# Patient Record
Sex: MALE | Race: BLACK OR AFRICAN AMERICAN | Employment: FULL TIME | URBAN - METROPOLITAN AREA
[De-identification: names, ages, dates, MRNs, and addresses within clinical notes are randomized per-mention and may not be internally consistent; named-entity substitution may affect disease eponyms.]

---

## 2023-01-02 ENCOUNTER — APPOINTMENT (OUTPATIENT)
Dept: GENERAL RADIOLOGY | Age: 53
DRG: 312 | End: 2023-01-02
Payer: COMMERCIAL

## 2023-01-02 ENCOUNTER — APPOINTMENT (OUTPATIENT)
Dept: CT IMAGING | Age: 53
DRG: 312 | End: 2023-01-02
Payer: COMMERCIAL

## 2023-01-02 ENCOUNTER — HOSPITAL ENCOUNTER (INPATIENT)
Age: 53
LOS: 2 days | Discharge: HOME | DRG: 312 | End: 2023-01-04
Attending: EMERGENCY MEDICINE | Admitting: INTERNAL MEDICINE
Payer: COMMERCIAL

## 2023-01-02 DIAGNOSIS — R07.9 CHEST PAIN, UNSPECIFIED TYPE: ICD-10-CM

## 2023-01-02 DIAGNOSIS — K13.0 SORE OF LOWER LIP: ICD-10-CM

## 2023-01-02 DIAGNOSIS — R55 SYNCOPE AND COLLAPSE: Primary | ICD-10-CM

## 2023-01-02 DIAGNOSIS — D61.818 PANCYTOPENIA (HCC): ICD-10-CM

## 2023-01-02 LAB
ANION GAP SERPL CALCULATED.3IONS-SCNC: 12 MMOL/L (ref 3–16)
ANISOCYTOSIS: ABNORMAL
BANDED NEUTROPHILS RELATIVE PERCENT: 6 % (ref 0–7)
BASOPHILS ABSOLUTE: 0 K/UL (ref 0–0.2)
BASOPHILS RELATIVE PERCENT: 1 %
BUN BLDV-MCNC: 17 MG/DL (ref 7–20)
CALCIUM SERPL-MCNC: 9.7 MG/DL (ref 8.3–10.6)
CHLORIDE BLD-SCNC: 101 MMOL/L (ref 99–110)
CHOLESTEROL, TOTAL: 150 MG/DL (ref 0–199)
CO2: 27 MMOL/L (ref 21–32)
CREAT SERPL-MCNC: 1.1 MG/DL (ref 0.9–1.3)
EOSINOPHILS ABSOLUTE: 0 K/UL (ref 0–0.6)
EOSINOPHILS RELATIVE PERCENT: 0 %
GFR SERPL CREATININE-BSD FRML MDRD: >60 ML/MIN/{1.73_M2}
GLUCOSE BLD-MCNC: 104 MG/DL (ref 70–99)
HCT VFR BLD CALC: 37 % (ref 40.5–52.5)
HDLC SERPL-MCNC: 38 MG/DL (ref 40–60)
HEMATOLOGY PATH CONSULT: YES
HEMOGLOBIN: 11.7 G/DL (ref 13.5–17.5)
LDL CHOLESTEROL CALCULATED: 87 MG/DL
LYMPHOCYTES ABSOLUTE: 0.6 K/UL (ref 1–5.1)
LYMPHOCYTES RELATIVE PERCENT: 24 %
MCH RBC QN AUTO: 26.1 PG (ref 26–34)
MCHC RBC AUTO-ENTMCNC: 31.7 G/DL (ref 31–36)
MCV RBC AUTO: 82.3 FL (ref 80–100)
MICROCYTES: ABNORMAL
MONOCYTES ABSOLUTE: 1.2 K/UL (ref 0–1.3)
MONOCYTES RELATIVE PERCENT: 45 %
NEUTROPHILS ABSOLUTE: 0.8 K/UL (ref 1.7–7.7)
NEUTROPHILS RELATIVE PERCENT: 24 %
OVALOCYTES: ABNORMAL
PDW BLD-RTO: 16 % (ref 12.4–15.4)
PLATELET # BLD: 68 K/UL (ref 135–450)
PLATELET SLIDE REVIEW: ABNORMAL
PMV BLD AUTO: 10.2 FL (ref 5–10.5)
POIKILOCYTES: ABNORMAL
POTASSIUM REFLEX MAGNESIUM: 4.2 MMOL/L (ref 3.5–5.1)
RAPID INFLUENZA  B AGN: NEGATIVE
RAPID INFLUENZA A AGN: NEGATIVE
RBC # BLD: 4.5 M/UL (ref 4.2–5.9)
SARS-COV-2, NAAT: NOT DETECTED
SLIDE REVIEW: ABNORMAL
SODIUM BLD-SCNC: 140 MMOL/L (ref 136–145)
TEAR DROP CELLS: ABNORMAL
TRIGL SERPL-MCNC: 124 MG/DL (ref 0–150)
TROPONIN: <0.01 NG/ML
VLDLC SERPL CALC-MCNC: 25 MG/DL
WBC # BLD: 2.7 K/UL (ref 4–11)

## 2023-01-02 PROCEDURE — 80048 BASIC METABOLIC PNL TOTAL CA: CPT

## 2023-01-02 PROCEDURE — 93005 ELECTROCARDIOGRAM TRACING: CPT | Performed by: EMERGENCY MEDICINE

## 2023-01-02 PROCEDURE — 71046 X-RAY EXAM CHEST 2 VIEWS: CPT

## 2023-01-02 PROCEDURE — 84484 ASSAY OF TROPONIN QUANT: CPT

## 2023-01-02 PROCEDURE — 87804 INFLUENZA ASSAY W/OPTIC: CPT

## 2023-01-02 PROCEDURE — 87635 SARS-COV-2 COVID-19 AMP PRB: CPT

## 2023-01-02 PROCEDURE — 80061 LIPID PANEL: CPT

## 2023-01-02 PROCEDURE — 85025 COMPLETE CBC W/AUTO DIFF WBC: CPT

## 2023-01-02 PROCEDURE — 6370000000 HC RX 637 (ALT 250 FOR IP): Performed by: INTERNAL MEDICINE

## 2023-01-02 PROCEDURE — 72125 CT NECK SPINE W/O DYE: CPT

## 2023-01-02 PROCEDURE — 1200000000 HC SEMI PRIVATE

## 2023-01-02 PROCEDURE — 70450 CT HEAD/BRAIN W/O DYE: CPT

## 2023-01-02 PROCEDURE — 2580000003 HC RX 258: Performed by: INTERNAL MEDICINE

## 2023-01-02 PROCEDURE — 99285 EMERGENCY DEPT VISIT HI MDM: CPT

## 2023-01-02 PROCEDURE — 36415 COLL VENOUS BLD VENIPUNCTURE: CPT

## 2023-01-02 RX ORDER — PROMETHAZINE HYDROCHLORIDE 25 MG/1
12.5 TABLET ORAL EVERY 6 HOURS PRN
Status: DISCONTINUED | OUTPATIENT
Start: 2023-01-02 | End: 2023-01-04 | Stop reason: HOSPADM

## 2023-01-02 RX ORDER — LISINOPRIL 20 MG/1
20 TABLET ORAL DAILY
Status: DISCONTINUED | OUTPATIENT
Start: 2023-01-03 | End: 2023-01-04 | Stop reason: HOSPADM

## 2023-01-02 RX ORDER — FINASTERIDE 1 MG/1
1 TABLET, FILM COATED ORAL DAILY
Status: DISCONTINUED | OUTPATIENT
Start: 2023-01-02 | End: 2023-01-02 | Stop reason: RX

## 2023-01-02 RX ORDER — M-VIT,TX,IRON,MINS/CALC/FOLIC 27MG-0.4MG
1 TABLET ORAL DAILY
COMMUNITY

## 2023-01-02 RX ORDER — SODIUM CHLORIDE 9 MG/ML
INJECTION, SOLUTION INTRAVENOUS CONTINUOUS
Status: DISCONTINUED | OUTPATIENT
Start: 2023-01-02 | End: 2023-01-04

## 2023-01-02 RX ORDER — ACETAMINOPHEN 650 MG/1
650 SUPPOSITORY RECTAL EVERY 6 HOURS PRN
Status: DISCONTINUED | OUTPATIENT
Start: 2023-01-02 | End: 2023-01-04 | Stop reason: HOSPADM

## 2023-01-02 RX ORDER — ONDANSETRON 2 MG/ML
4 INJECTION INTRAMUSCULAR; INTRAVENOUS EVERY 6 HOURS PRN
Status: DISCONTINUED | OUTPATIENT
Start: 2023-01-02 | End: 2023-01-04 | Stop reason: HOSPADM

## 2023-01-02 RX ORDER — POTASSIUM CHLORIDE 7.45 MG/ML
10 INJECTION INTRAVENOUS PRN
Status: DISCONTINUED | OUTPATIENT
Start: 2023-01-02 | End: 2023-01-04 | Stop reason: HOSPADM

## 2023-01-02 RX ORDER — POTASSIUM CHLORIDE 20 MEQ/1
40 TABLET, EXTENDED RELEASE ORAL PRN
Status: DISCONTINUED | OUTPATIENT
Start: 2023-01-02 | End: 2023-01-04 | Stop reason: HOSPADM

## 2023-01-02 RX ORDER — SODIUM CHLORIDE 0.9 % (FLUSH) 0.9 %
10 SYRINGE (ML) INJECTION PRN
Status: DISCONTINUED | OUTPATIENT
Start: 2023-01-02 | End: 2023-01-04 | Stop reason: HOSPADM

## 2023-01-02 RX ORDER — SODIUM CHLORIDE 9 MG/ML
INJECTION, SOLUTION INTRAVENOUS PRN
Status: DISCONTINUED | OUTPATIENT
Start: 2023-01-02 | End: 2023-01-04 | Stop reason: HOSPADM

## 2023-01-02 RX ORDER — ACETAMINOPHEN 325 MG/1
650 TABLET ORAL EVERY 6 HOURS PRN
Status: DISCONTINUED | OUTPATIENT
Start: 2023-01-02 | End: 2023-01-04 | Stop reason: HOSPADM

## 2023-01-02 RX ORDER — HYDROCHLOROTHIAZIDE 25 MG/1
25 TABLET ORAL DAILY
Status: DISCONTINUED | OUTPATIENT
Start: 2023-01-03 | End: 2023-01-04 | Stop reason: HOSPADM

## 2023-01-02 RX ORDER — HYDROCHLOROTHIAZIDE 25 MG/1
25 TABLET ORAL DAILY
COMMUNITY

## 2023-01-02 RX ORDER — SODIUM CHLORIDE 0.9 % (FLUSH) 0.9 %
10 SYRINGE (ML) INJECTION EVERY 12 HOURS SCHEDULED
Status: DISCONTINUED | OUTPATIENT
Start: 2023-01-02 | End: 2023-01-04 | Stop reason: HOSPADM

## 2023-01-02 RX ORDER — MAGNESIUM SULFATE IN WATER 40 MG/ML
2000 INJECTION, SOLUTION INTRAVENOUS PRN
Status: DISCONTINUED | OUTPATIENT
Start: 2023-01-02 | End: 2023-01-04 | Stop reason: HOSPADM

## 2023-01-02 RX ORDER — IBUPROFEN 200 MG
400 TABLET ORAL 2 TIMES DAILY PRN
COMMUNITY

## 2023-01-02 RX ORDER — SILDENAFIL 50 MG/1
50 TABLET, FILM COATED ORAL PRN
COMMUNITY

## 2023-01-02 RX ORDER — ENOXAPARIN SODIUM 100 MG/ML
40 INJECTION SUBCUTANEOUS DAILY
Status: DISCONTINUED | OUTPATIENT
Start: 2023-01-03 | End: 2023-01-02

## 2023-01-02 RX ORDER — FINASTERIDE 1 MG/1
1 TABLET, FILM COATED ORAL DAILY
COMMUNITY

## 2023-01-02 RX ORDER — LISINOPRIL 20 MG/1
20 TABLET ORAL DAILY
COMMUNITY

## 2023-01-02 RX ADMIN — SODIUM CHLORIDE: 9 INJECTION, SOLUTION INTRAVENOUS at 19:37

## 2023-01-02 RX ADMIN — ACETAMINOPHEN 325MG 650 MG: 325 TABLET ORAL at 19:30

## 2023-01-02 ASSESSMENT — PAIN SCALES - GENERAL
PAINLEVEL_OUTOF10: 3
PAINLEVEL_OUTOF10: 0

## 2023-01-02 ASSESSMENT — ENCOUNTER SYMPTOMS
COUGH: 0
CHEST TIGHTNESS: 1
VOMITING: 0
COLOR CHANGE: 0
ABDOMINAL PAIN: 0
SHORTNESS OF BREATH: 0
NAUSEA: 0

## 2023-01-02 ASSESSMENT — PAIN - FUNCTIONAL ASSESSMENT: PAIN_FUNCTIONAL_ASSESSMENT: 0-10

## 2023-01-02 ASSESSMENT — PAIN DESCRIPTION - LOCATION
LOCATION: BUTTOCKS
LOCATION: BUTTOCKS;ELBOW

## 2023-01-02 NOTE — ED NOTES
Pt arrived to dept via ems. Pt c/o syncopal episode while at store this morning. Pt felt warm fuzzy feeling come over him and store staff said pt fell to ground. Pt states he did hit head but is having no pain. Denies chest pain and dizziness at this time. Denies taking blood thinners. Pt awake, alert and oriented x 3. Skin warm and dry/normal color for ethnicity. Resp easy and unlabored. Pt placed in gown and on cardiac monitor. Call light in reach. Will continue to monitor.       Martie Kawasaki, RN  01/02/23 3333

## 2023-01-02 NOTE — ED NOTES
12-lead EKG completed. Pt tolerated well. No other requests at this time.       Maggi Trejo  01/02/23 1144

## 2023-01-02 NOTE — ED PROVIDER NOTES
[facility]        Pt Name: Clare Brown  MRN: [id number]  [place] 1970  Date of evaluation: 1/2/2023  Provider: ESTELA Merida  PCP: JALEEL Piña CNP  Note Started: 3:31 PM EST 1/2/23       I have seen and evaluated this patient with my supervising physician Raquel Mohamud, [address]       Chief Complaint   Patient presents with    Loss of Consciousness     Syncopal episode at store. Onset of warm feeling, LOC, Hit head. Denies chest pain, denies dizzy, denies blood thinners. HISTORY OF PRESENT ILLNESS: 1 or more Elements     History from : Patient    Limitations to history : None    Clare Brown is a 45 y.o. male who presents to the emergency department today with complaints of a syncopal episode. Patient reports that for the last several days he has been experiencing intermittent left-sided chest pain, palpitations. Today while he was standing in line at office supply store he suddenly became hot, sweaty, and passed out. He states this is never happened before. He has been undergoing a lot of medical testing recently due to pancytopenia and consistently reactive syphilis testing despite treatment. At the moment he has no chest pain. He has no further complaints. Nursing Notes were all reviewed and agreed with or any disagreements were addressed in the HPI. REVIEW OF SYSTEMS :      Review of Systems   Constitutional:  Negative for chills and fever. HENT:  Positive for mouth sores (lower right lip). Negative for congestion. Respiratory:  Positive for chest tightness. Negative for cough and shortness of breath. Cardiovascular:  Positive for chest pain and palpitations. Negative for leg swelling. Gastrointestinal:  Negative for abdominal pain, nausea and vomiting. Genitourinary:  Negative for dysuria and urgency. Skin:  Negative for color change and rash.    Neurological: Positive for syncope. Negative for weakness and numbness. Psychiatric/Behavioral:  Negative for agitation and confusion. Positives and Pertinent negatives as per HPI. SURGICAL HISTORY   No past surgical history on file. CURRENTMEDICATIONS       Previous Medications    FINASTERIDE (PROPECIA) 1 MG TABLET    Take 1 mg by mouth daily    HYDROCHLOROTHIAZIDE (HYDRODIURIL) 25 MG TABLET    Take 25 mg by mouth daily    IBUPROFEN (ADVIL;MOTRIN) 200 MG TABLET    Take 400 mg by mouth 2 times daily as needed for Pain    LISINOPRIL (PRINIVIL;ZESTRIL) 20 MG TABLET    Take 20 mg by mouth daily    MULTIPLE VITAMINS-MINERALS (THERAPEUTIC MULTIVITAMIN-MINERALS) TABLET    Take 1 tablet by mouth daily    SILDENAFIL (VIAGRA) 50 MG TABLET    Take 50 mg by mouth as needed for Erectile Dysfunction       ALLERGIES     Bactrim [sulfamethoxazole-trimethoprim]    FAMILYHISTORY     No family history on file. SOCIAL HISTORY          SCREENINGS        Townsend Coma Scale  Eye Opening: Spontaneous  Best Verbal Response: Oriented  Best Motor Response: Obeys commands  Townsend Coma Scale Score: 15                CIWA Assessment  BP: 124/78  Heart Rate: 91           PHYSICAL EXAM  1 or more Elements     ED Triage Vitals   BP Temp Temp Source Heart Rate Resp SpO2 Height Weight   01/02/23 1135 01/02/23 1136 01/02/23 1136 01/02/23 1135 01/02/23 1135 01/02/23 1135 01/02/23 1135 01/02/23 1135   134/76 98.9 °F (37.2 °C) Oral 89 17 100 % 5' 9\" (1.753 m) 151 lb 0.2 oz (68.5 kg)       Physical Exam  Vitals and nursing note reviewed. Constitutional:       General: He is not in acute distress. Appearance: Normal appearance. He is well-developed. He is not ill-appearing, toxic-appearing or diaphoretic. HENT:      Head: Normocephalic and atraumatic. No raccoon eyes, Camacho's sign, contusion or laceration. Right Ear: Tympanic membrane normal. No hemotympanum. Left Ear: Tympanic membrane normal. No hemotympanum.       Mouth/Throat:      Mouth: Mucous membranes are moist.      Pharynx: Oropharynx is clear.   Eyes:      Conjunctiva/sclera: Conjunctivae normal.      Pupils: Pupils are equal, round, and reactive to light.   Cardiovascular:      Rate and Rhythm: Normal rate and regular rhythm.      Pulses: Normal pulses.   Pulmonary:      Effort: Pulmonary effort is normal. No respiratory distress.      Breath sounds: Normal breath sounds.   Chest:      Chest wall: No tenderness.   Abdominal:      General: Bowel sounds are normal. There is no distension.      Palpations: Abdomen is soft.      Tenderness: There is no abdominal tenderness.   Musculoskeletal:      Right elbow: No swelling or deformity. Normal range of motion. Tenderness present.      Left elbow: No swelling or deformity. Normal range of motion. Tenderness present.      Right hand: Normal.      Left hand: Normal.      Cervical back: Normal, normal range of motion and neck supple.      Thoracic back: Normal.      Lumbar back: Normal.      Right lower leg: No edema.      Left lower leg: No edema.   Skin:     General: Skin is warm and dry.   Neurological:      General: No focal deficit present.      Mental Status: He is alert and oriented to person, place, and time.      Cranial Nerves: No cranial nerve deficit.      Sensory: No sensory deficit.      Motor: No weakness.      Coordination: Coordination normal.      Gait: Gait normal.      Deep Tendon Reflexes: Reflexes normal.   Psychiatric:         Mood and Affect: Mood normal.         Behavior: Behavior normal. Behavior is cooperative.       DIAGNOSTIC RESULTS   LABS:    Labs Reviewed   CBC WITH AUTO DIFFERENTIAL - Abnormal; Notable for the following components:       Result Value    WBC 2.7 (*)     Hemoglobin 11.7 (*)     Hematocrit 37.0 (*)     RDW 16.0 (*)     Platelets 68 (*)     Neutrophils Absolute 0.8 (*)     Lymphocytes Absolute 0.6 (*)     Anisocytosis 2+ (*)     Microcytes 2+ (*)     Poikilocytes 2+ (*)     Ovalocytes 2+ (*)   Tear Drop Cells Occasional (*)     All other components within normal limits    Narrative:     Savanah Camargo tel. [id number],  Hematology results called to and read back by [facility]., 01/02/2023  14:11, by [facility]   BASIC METABOLIC PANEL W/ REFLEX TO MG FOR LOW K - Abnormal; Notable for the following components:    Glucose 104 (*)     All other components within normal limits   COVID-19, RAPID   RAPID INFLUENZA A/B ANTIGENS   TROPONIN   LIPID PANEL   BASIC METABOLIC PANEL W/ REFLEX TO MG FOR LOW K   CBC WITH AUTO DIFFERENTIAL   TROPONIN   TROPONIN       When ordered only abnormal lab results are displayed. All other labs were within normal range or not returned as of this dictation. EKG: When ordered, EKG's are interpreted by the Emergency Department Physician in the absence of a cardiologist.  Please see their note for interpretation of EKG. RADIOLOGY:   Non-plain film images such as CT, Ultrasound and MRI are read by the radiologist. Plain radiographic images are visualized and preliminarily interpreted by the ED Provider with the below findings:    Interpretation per the Radiologist below, if available at the time of this note:    XR CHEST (2 VW)   Final Result   No radiographic evidence of an acute cardiopulmonary process. CT HEAD WO CONTRAST   Final Result   No acute intracranial abnormality. CT CERVICAL SPINE WO CONTRAST   Final Result   No acute abnormality of the cervical spine. XR CHEST (2 VW)    Result Date: 1/2/2023  EXAMINATION: TWO XRAY VIEWS OF THE CHEST 1/2/2023 11:15 am COMPARISON: None. HISTORY: ORDERING SYSTEM PROVIDED HISTORY: Syncope TECHNOLOGIST PROVIDED HISTORY: Reason for exam:->Syncope Reason for Exam: Syncope FINDINGS: The lungs and costophrenic angles are clear. The cardiomediastinal silhouette and pulmonary vessels appear normal.     No radiographic evidence of an acute cardiopulmonary process.      CT HEAD WO CONTRAST    Result Date: 1/2/2023  EXAMINATION: CT OF THE HEAD WITHOUT CONTRAST  1/2/2023 1:55 pm TECHNIQUE: CT of the head was performed without the administration of intravenous contrast. Automated exposure control, iterative reconstruction, and/or weight based adjustment of the mA/kV was utilized to reduce the radiation dose to as low as reasonably achievable. COMPARISON: None. HISTORY: ORDERING SYSTEM PROVIDED HISTORY: syncope TECHNOLOGIST PROVIDED HISTORY: If patient is on cardiac monitor and/or pulse ox, they may be taken off cardiac monitor and pulse ox, left on O2 if currently on. All monitors reattached when patient returns to room. Has a \"code stroke\" or \"stroke alert\" been called? ->No Reason for exam:->syncope Decision Support Exception - unselect if not a suspected or confirmed emergency medical condition->Emergency Medical Condition (MA) Reason for Exam: syncope FINDINGS: BRAIN/VENTRICLES: There is no acute intracranial hemorrhage, mass effect or midline shift. No abnormal extra-axial fluid collection. The gray-white differentiation is maintained without evidence of an acute infarct. There is no evidence of hydrocephalus. ORBITS: The visualized portion of the orbits demonstrate no acute abnormality. SINUSES: The visualized paranasal sinuses and mastoid air cells demonstrate no acute abnormality. SOFT TISSUES/SKULL:  No acute abnormality of the visualized skull or soft tissues. No acute intracranial abnormality. CT CERVICAL SPINE WO CONTRAST    Result Date: 1/2/2023  EXAMINATION: CT OF THE CERVICAL SPINE WITHOUT CONTRAST 1/2/2023 1:55 pm TECHNIQUE: CT of the cervical spine was performed without the administration of intravenous contrast. Multiplanar reformatted images are provided for review. Automated exposure control, iterative reconstruction, and/or weight based adjustment of the mA/kV was utilized to reduce the radiation dose to as low as reasonably achievable. COMPARISON: None.  HISTORY: ORDERING SYSTEM PROVIDED HISTORY: syncope TECHNOLOGIST PROVIDED HISTORY: Reason for exam:->syncope Decision Support Exception - unselect if not a suspected or confirmed emergency medical condition->Emergency Medical Condition (MA) Reason for Exam: syncope FINDINGS: BONES/ALIGNMENT: Incomplete fusion posterior arch of C1 as a normal variant. No fracture or subluxation throughout the cervical spine. DEGENERATIVE CHANGES: Mild pattern of degenerative spondylosis at C3-4 through C5-6. No significant spinal canal stenosis. SOFT TISSUES: Paraspinal soft tissues are normal.  The visualized lungs are clear. No acute abnormality of the cervical spine. No results found. PROCEDURES   Unless otherwise noted below, none     Procedures    CRITICAL CARE TIME (.cctime)   The total critical care time I independently spent while evaluating and treating this patient was 17 minutes. This excludes time spent doing separately billable procedures. This includes time at the bedside, data interpretation, medication management, obtaining critical history from collateral sources if the patient is unable to provide it directly, and physician consultation. Specifics of interventions taken and potentially life-threatening diagnostic considerations are listed above in the medical decision making. If this was a shared visit with an physician, the time in this attestation is non-concurrent critical care time out of the total shared critical care time provided by the physician and myself. PAST MEDICAL HISTORY      has no past medical history on file.        EMERGENCY DEPARTMENT COURSE and DIFFERENTIAL DIAGNOSIS/MDM:   Vitals:    Vitals:    01/02/23 1215 01/02/23 1230 01/02/23 1342 01/02/23 1730   BP: (!) 125/98 (!) 126/103 121/88 124/78   Pulse: 94 92  91   Resp: 13 21  18   Temp:       TempSrc:       SpO2:    100%   Weight:       Height:           Patient was given the following medications:  Medications   sodium chloride flush 0.9 % injection 10 mL (has no administration in time range)   sodium chloride flush 0.9 % injection 10 mL (has no administration in time range)   0.9 % sodium chloride infusion (has no administration in time range)   potassium chloride (KLOR-CON M) extended release tablet 40 mEq (has no administration in time range)     Or   potassium bicarb-citric acid (EFFER-K) effervescent tablet 40 mEq (has no administration in time range)     Or   potassium chloride 10 mEq/100 mL IVPB (Peripheral Line) (has no administration in time range)   potassium chloride 10 mEq/100 mL IVPB (Peripheral Line) (has no administration in time range)   magnesium sulfate 2000 mg in 50 mL IVPB premix (has no administration in time range)   promethazine (PHENERGAN) tablet 12.5 mg (has no administration in time range)     Or   ondansetron (ZOFRAN) injection 4 mg (has no administration in time range)   magnesium hydroxide (MILK OF MAGNESIA) 400 MG/5ML suspension 30 mL (has no administration in time range)   acetaminophen (TYLENOL) tablet 650 mg (650 mg Oral Given 1/2/23 1930)     Or   acetaminophen (TYLENOL) suppository 650 mg ( Rectal See Alternative 1/2/23 1930)   finasteride (PROPECIA) tablet 1 mg (has no administration in time range)   hydroCHLOROthiazide (HYDRODIURIL) tablet 25 mg (has no administration in time range)   lisinopril (PRINIVIL;ZESTRIL) tablet 20 mg (has no administration in time range)   0.9 % sodium chloride infusion ( IntraVENous New Bag 1/2/23 1937)   perflutren lipid microspheres (DEFINITY) injection 1.5 mL (has no administration in time range)             Is this patient to be included in the SEP-1 Core Measure due to severe sepsis or septic shock? No   Exclusion criteria - the patient is NOT to be included for SEP-1 Core Measure due to:   Infection is not suspected    CONSULTS: (Who and What was discussed)  IP CONSULT TO CARDIOLOGY  IP CONSULT TO ONCOLOGY  Discussion with Other Profesionals : Admitting Team Hospitalist consulted for admission orders. Social Determinants : None    Records Reviewed : Outpatient Labs & Studies In 2011, pt had stress test done at [facility]. EMR reviewed as well. CC/HPI Summary, DDx, ED Course, and Reassessment: This patient is a 57-year-old male who presents to the emergency department today with a several day history of left-sided chest pain, palpitations, which culminated in a syncopal event that occurred shortly prior to arrival.  Patient at the time of valuation has no chest pain. Differential diagnoses includes ACS, arrhythmia, electrolyte disturbance, intracranial hemorrhage, fracture, contusion, other. Labs and imaging ordered. Labs with white blood cell count of 2.7, hemoglobin 11.7, hematocrit 37, platelet count 68. This is consistent with his history. Metabolic panel with no concerning abnormalities. Troponin is less than 0.01. COVID swab is negative, flu swab is negative. Chest x-ray with no acute cardiopulmonary process. CT scan of head with no acute intracranial abnormality, CT of the cervical spine with no acute cervical spine abnormality. Please see attending physician's note for EKG interpretation. Results reviewed with the patient. At this time, the patient would benefit from admission for further evaluation and management. Hospitalist was consulted for admission orders. Patient verbalized understanding, agreed with the plan. FINAL IMPRESSION      1. Syncope and collapse    2. Chest pain, unspecified type    3. Sore of lower lip    4. Pancytopenia ([address]          DISPOSITION/PLAN     DISPOSITION Admitted 01/02/2023 04:15:24 PM      PATIENT REFERRED TO:  No follow-up provider specified.     DISCHARGE MEDICATIONS:  New Prescriptions    No medications on file       DISCONTINUED MEDICATIONS:  Discontinued Medications    No medications on file              (Please note that portions of this note were completed with a voice recognition program.  Efforts were made to edit the dictations but occasionally words are mis-transcribed.)    ESTELA Garza (electronically signed)           ESTELA Garza  01/02/23 1940

## 2023-01-02 NOTE — ED PROVIDER NOTES
Emergency Department Attending Provider Note  Location: [facility] EMERGENCY DEPARTMENT  1/2/2023     Patient Identification  Alan Kiser is a 45 y.o. male      Vickii Faizan was evaluated in the Emergency Department for chest pain, palpitations, and syncope. Patient states that he was within normal limits until a couple of days ago. Patient states that since that time he has noted intermittent chest pain as well as palpitations. Today patient was reportedly at the store when he had lightheadedness followed by transient syncope. No injury noted. Patient denies chest pain at this time. . Although initial history and physical exam information was obtained by DANDRE/NPP/MD/ (who also dictated a record of this visit), I personally saw the patient and performed a substantive portion of the visit including all aspects of the medical decision making. PHYSICAL EXAM:    Head: Normocephalic/atraumatic. Heart: Regular rate and rhythm. Normal S1-S2. Lungs: Clear to auscultation bilaterally. No wheezes, rales, or rhonchi. Abdomen: Soft. Nontender, nondistended. Rebound, guarding. EKG Interpretation    EKG: Normal sinus rhythm with a rate of 90. Normal axis. Normal intervals and durations. Right bundle branch block noted. No ST or T wave changes noted. No previous EKG. Patient seen and evaluated. Relevant records reviewed. Patient's labs and EKG are stable. He will be admitted for further evaluation concerning his chest pain, palpitations, and syncope. CLINICAL IMPRESSION  1. Syncope and collapse    2. Chest pain, unspecified type    3. Sore of lower lip    4. Pancytopenia ([address]          Leander THURSTON DO, am the primary clinician of record. I personally saw the patient and independently provided 0 minutes of non-concurrent critical care out of the total shared critical care time provided.     This chart was generated in part by using [employer] and may contain errors related to that system including errors in grammar, punctuation, and spelling, as well as words and phrases that may be inappropriate. If there are any questions or concerns please feel free to contact the dictating provider for clarification.      Ruma Montemayor DO   [employer]       Ruma Montemayor DO  01/02/23 1533

## 2023-01-02 NOTE — H&P
Hospital Medicine History & Physical      PCP: Griffin Leon, APRN - CNP    Date of Admission: 1/2/2023    Chief Complaint:    Chief Complaint   Patient presents with    Loss of Consciousness     Syncopal episode at store. Onset of warm feeling, LOC, Hit head. Denies chest pain, denies dizzy, denies blood thinners. History Of Present Illness:    Patient is a 49-year-old male who presents to the hospital after an episode of loss of consciousness. According to patient he was at work he felt dizzy lightheaded before the episode. He denies chest pain shortness of breath nausea vomiting. He mentioned he felt sweaty before the event. Patient denied fever chills diarrhea constipation dysuria. He also reports he has chest pain on the right side of her chest, denies active chest pain at time of my evaluation, he has not noticed any aggravating or relieving factors. He has established hematologist for his pancytopenia, he was diagnosed with syphilis in the past, he had bone marrow biopsy as outpatient. Past Medical History:      Pancytopenia  Syphilis    Past Surgical History:      No past surgical history on file. Medications Prior to Admission:      Prior to Admission medications    Medication Sig Start Date End Date Taking?  Authorizing Provider   sildenafil (VIAGRA) 50 MG tablet Take 50 mg by mouth as needed for Erectile Dysfunction   Yes Historical Provider, MD   finasteride (PROPECIA) 1 MG tablet Take 1 mg by mouth daily   Yes Historical Provider, MD   Multiple Vitamins-Minerals (THERAPEUTIC MULTIVITAMIN-MINERALS) tablet Take 1 tablet by mouth daily   Yes Historical Provider, MD   lisinopril (PRINIVIL;ZESTRIL) 20 MG tablet Take 20 mg by mouth daily   Yes Historical Provider, MD   hydroCHLOROthiazide (HYDRODIURIL) 25 MG tablet Take 25 mg by mouth daily   Yes Historical Provider, MD   ibuprofen (ADVIL;MOTRIN) 200 MG tablet Take 400 mg by mouth 2 times daily as needed for Pain   Yes Historical Provider, MD       Allergies:  Bactrim [sulfamethoxazole-trimethoprim]    Social History:      TOBACCO:   has no history on file for tobacco use. ETOH:   has no history on file for alcohol use. Family History:       Reviewed in detail and non contributory      No family history on file. REVIEW OF SYSTEMS:   Pertinent positives as noted in the HPI. All other systems reviewed and negative. PHYSICAL EXAM PERFORMED:    /78   Pulse 91   Temp 98.9 °F (37.2 °C) (Oral)   Resp 18   Ht 5' 9\" (1.753 m)   Wt 151 lb 0.2 oz (68.5 kg)   SpO2 100%   BMI 22.30 kg/m²     General appearance:  No apparent distress, cooperative. HEENT:  Normal cephalic, atraumatic without obvious deformity. Conjunctivae/corneas clear. Neck: Supple, with full range of motion. No cervical lymphadenopathy  Respiratory:  Normal respiratory effort. Clear to auscultation, bilaterally without Rales/Wheezes/Rhonchi. Cardiovascular:  Regular rate and rhythm with normal S1/S2 without murmurs, rubs or gallops. Abdomen: Soft, non-tender, non-distended, normal bowel sounds. Musculoskeletal:  No edema noted bilaterally. No tenderness on palpation   Skin: no rash visible  Neurologic:  Neurologically intact without any focal sensory/motor deficits. grossly non-focal.  Psychiatric:  Alert and oriented, normal mood  Peripheral Pulses: +2 palpable, equal bilaterally       Labs:     Recent Labs     01/02/23  1212   WBC 2.7*   HGB 11.7*   HCT 37.0*   PLT 68*     Recent Labs     01/02/23  1212      K 4.2      CO2 27   BUN 17   CREATININE 1.1   CALCIUM 9.7     No results for input(s): AST, ALT, BILIDIR, BILITOT, ALKPHOS in the last 72 hours. No results for input(s): INR in the last 72 hours.   Recent Labs     01/02/23  1212   TROPONINI <0.01       Urinalysis:    No results found for: Valentina Bernardo, BACTERIA, RBCUA, BLOODU, SPECGRAV, GLUCOSEU    Radiology:       XR CHEST (2 VW)   Final Result   No radiographic evidence of an acute cardiopulmonary process. CT HEAD WO CONTRAST   Final Result   No acute intracranial abnormality. CT CERVICAL SPINE WO CONTRAST   Final Result   No acute abnormality of the cervical spine. Active Hospital Problems    Diagnosis Date Noted    Chest pain [R07.9] 01/02/2023     Priority: Medium         Patient is a 49-year-old male who presents to the hospital after an episode of loss of consciousness. According to patient he was at work he felt dizzy lightheaded before the episode. He denies chest pain shortness of breath nausea vomiting. He mentioned he felt sweaty before the event. Patient denied fever chills diarrhea constipation dysuria. He also reports he has chest pain on the right side of her chest, denies active chest pain at time of my evaluation, he has not noticed any aggravating or relieving factors. He has established hematologist for his pancytopenia, he was diagnosed with syphilis in the past, he had bone marrow biopsy as outpatient. Assessment  Syncope and collapse suspect orthostatic hypotension  Chest pain rule out ACS  Pancytopenia    Plan  Check orthostatic vitals   monitor troponin  Monitor on cardiac telemetry  Consult cardiology  Check echocardiogram  Holding chemical DVT prophylaxis in the setting of thrombocytopenia DVT prophylaxis-SCDs for now  Resume home medications  Diet: ADULT DIET; Regular  Code Status: Full Code    PT/OT Eval Status: ordered    Dispo - pending clinical improvement       Antonella Walton MD    The note was completed using EMR and Dragon dictation system. Every effort was made to ensure accuracy; however, inadvertent computerized transcription errors may be present. Thank you JALEEL Landers CNP for the opportunity to be involved in this patient's care. If you have any questions or concerns please feel free to contact me at [phone].     Antonella Walton MD

## 2023-01-02 NOTE — LETTER
[address]  Phone: [phone]             January 4, 2023    Patient: Yohan Barboza   YOB: 1970   Date of Visit: 1/2/2023       To Whom It May Concern:    Yohan Barboza was seen and treated in our facility  beginning 1/2/2023 until 1/4/2023.        Sincerely,     Rose Mary Parkinson MD  [facility]     [phone]    Signature:__________________________________

## 2023-01-03 ENCOUNTER — APPOINTMENT (OUTPATIENT)
Dept: MRI IMAGING | Age: 53
DRG: 312 | End: 2023-01-03
Payer: COMMERCIAL

## 2023-01-03 LAB
ANION GAP SERPL CALCULATED.3IONS-SCNC: 9 MMOL/L (ref 3–16)
BASOPHILS ABSOLUTE: 0 K/UL (ref 0–0.2)
BASOPHILS RELATIVE PERCENT: 0.5 %
BUN BLDV-MCNC: 17 MG/DL (ref 7–20)
CALCIUM SERPL-MCNC: 8.3 MG/DL (ref 8.3–10.6)
CHLORIDE BLD-SCNC: 104 MMOL/L (ref 99–110)
CO2: 25 MMOL/L (ref 21–32)
CREAT SERPL-MCNC: 1 MG/DL (ref 0.9–1.3)
EKG ATRIAL RATE: 90 BPM
EKG DIAGNOSIS: NORMAL
EKG P AXIS: 76 DEGREES
EKG P-R INTERVAL: 148 MS
EKG Q-T INTERVAL: 370 MS
EKG QRS DURATION: 116 MS
EKG QTC CALCULATION (BAZETT): 452 MS
EKG R AXIS: 109 DEGREES
EKG T AXIS: 79 DEGREES
EKG VENTRICULAR RATE: 90 BPM
EOSINOPHILS ABSOLUTE: 0 K/UL (ref 0–0.6)
EOSINOPHILS RELATIVE PERCENT: 0.5 %
GFR SERPL CREATININE-BSD FRML MDRD: >60 ML/MIN/{1.73_M2}
GLUCOSE BLD-MCNC: 102 MG/DL (ref 70–99)
HCT VFR BLD CALC: 31 % (ref 40.5–52.5)
HEMATOLOGY PATH CONSULT: NO
HEMATOLOGY PATH CONSULT: NORMAL
HEMOGLOBIN: 10.1 G/DL (ref 13.5–17.5)
LYMPHOCYTES ABSOLUTE: 0.7 K/UL (ref 1–5.1)
LYMPHOCYTES RELATIVE PERCENT: 37.1 %
MCH RBC QN AUTO: 26.3 PG (ref 26–34)
MCHC RBC AUTO-ENTMCNC: 32.4 G/DL (ref 31–36)
MCV RBC AUTO: 81.1 FL (ref 80–100)
MONOCYTES ABSOLUTE: 0.7 K/UL (ref 0–1.3)
MONOCYTES RELATIVE PERCENT: 38.5 %
NEUTROPHILS ABSOLUTE: 0.5 K/UL (ref 1.7–7.7)
NEUTROPHILS RELATIVE PERCENT: 23.4 %
PDW BLD-RTO: 16.4 % (ref 12.4–15.4)
PLATELET # BLD: 50 K/UL (ref 135–450)
PMV BLD AUTO: 9.4 FL (ref 5–10.5)
POTASSIUM REFLEX MAGNESIUM: 3.8 MMOL/L (ref 3.5–5.1)
RBC # BLD: 3.83 M/UL (ref 4.2–5.9)
SODIUM BLD-SCNC: 138 MMOL/L (ref 136–145)
TROPONIN: <0.01 NG/ML
TROPONIN: <0.01 NG/ML
WBC # BLD: 1.9 K/UL (ref 4–11)

## 2023-01-03 PROCEDURE — 80048 BASIC METABOLIC PNL TOTAL CA: CPT

## 2023-01-03 PROCEDURE — 6370000000 HC RX 637 (ALT 250 FOR IP): Performed by: INTERNAL MEDICINE

## 2023-01-03 PROCEDURE — 84484 ASSAY OF TROPONIN QUANT: CPT

## 2023-01-03 PROCEDURE — 2580000003 HC RX 258: Performed by: INTERNAL MEDICINE

## 2023-01-03 PROCEDURE — 1200000000 HC SEMI PRIVATE

## 2023-01-03 PROCEDURE — 99254 IP/OBS CNSLTJ NEW/EST MOD 60: CPT | Performed by: STUDENT IN AN ORGANIZED HEALTH CARE EDUCATION/TRAINING PROGRAM

## 2023-01-03 PROCEDURE — 93010 ELECTROCARDIOGRAM REPORT: CPT | Performed by: INTERNAL MEDICINE

## 2023-01-03 PROCEDURE — 36415 COLL VENOUS BLD VENIPUNCTURE: CPT

## 2023-01-03 PROCEDURE — 85025 COMPLETE CBC W/AUTO DIFF WBC: CPT

## 2023-01-03 PROCEDURE — A9577 INJ MULTIHANCE: HCPCS | Performed by: INTERNAL MEDICINE

## 2023-01-03 PROCEDURE — 70553 MRI BRAIN STEM W/O & W/DYE: CPT

## 2023-01-03 PROCEDURE — 6360000004 HC RX CONTRAST MEDICATION: Performed by: INTERNAL MEDICINE

## 2023-01-03 RX ORDER — LIDOCAINE HYDROCHLORIDE 20 MG/ML
15 SOLUTION OROPHARYNGEAL 3 TIMES DAILY
Status: DISCONTINUED | OUTPATIENT
Start: 2023-01-03 | End: 2023-01-04 | Stop reason: HOSPADM

## 2023-01-03 RX ORDER — GUAIFENESIN/DEXTROMETHORPHAN 100-10MG/5
5 SYRUP ORAL EVERY 4 HOURS PRN
Status: DISCONTINUED | OUTPATIENT
Start: 2023-01-03 | End: 2023-01-04 | Stop reason: HOSPADM

## 2023-01-03 RX ADMIN — LIDOCAINE HYDROCHLORIDE 15 ML: 20 SOLUTION ORAL at 16:48

## 2023-01-03 RX ADMIN — GADOBENATE DIMEGLUMINE 14 ML: 529 INJECTION, SOLUTION INTRAVENOUS at 13:13

## 2023-01-03 RX ADMIN — LIDOCAINE HYDROCHLORIDE 15 ML: 20 SOLUTION ORAL at 19:49

## 2023-01-03 RX ADMIN — SODIUM CHLORIDE: 9 INJECTION, SOLUTION INTRAVENOUS at 23:45

## 2023-01-03 RX ADMIN — LIDOCAINE HYDROCHLORIDE 15 ML: 20 SOLUTION ORAL at 07:39

## 2023-01-03 RX ADMIN — HYDROCHLOROTHIAZIDE 25 MG: 25 TABLET ORAL at 08:14

## 2023-01-03 RX ADMIN — LISINOPRIL 20 MG: 20 TABLET ORAL at 08:14

## 2023-01-03 RX ADMIN — ACETAMINOPHEN 325MG 650 MG: 325 TABLET ORAL at 18:12

## 2023-01-03 RX ADMIN — Medication 10 ML: at 19:38

## 2023-01-03 ASSESSMENT — PAIN SCALES - GENERAL: PAINLEVEL_OUTOF10: 0

## 2023-01-03 NOTE — PROGRESS NOTES
Pharmacy does not stock Propecia 1 mg tablets    Patient states does not need    Pharmacy has discontinued the Propecia    If this is NOT OK., please contact the pharmacy  Thanks

## 2023-01-03 NOTE — CONSULTS
[facility].        Chief Complaint   Patient presents with    Loss of Consciousness     Syncopal episode at store. Onset of warm feeling, LOC, Hit head. Denies chest pain, denies dizzy, denies blood thinners. History of Present Illness:  Jacquie Abdalla is a 45 y.o. patient who presented to the hospital with complaints of Syncope. I have been asked to provide consultation regarding further management and testing. This is a very pleasant 23-year-old male with no documented past medical history though he does tell me that he is struggled with hypertension in the past.  He presents after a syncopal event, reports atypical chest pain that is been going on for months. 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 At that point he again felt flushed lightheaded but then this time consciousness. During this he endorsed to me he has had atypical right-sided chest pain has been on and off for several months, sounds like he thought it was more costochondral in nature and he does not particularly have an aggravating or alleviating factor. Past Medical History:   has no past medical history on file. Surgical History:   has no past surgical history on file. Social History:        Family History:  No family history of premature coronary artery disease, aortic disease, or valve disease. Home Medications:  Were reviewed and are listed in nursing record. and/or listed below  Prior to Admission medications    Medication Sig Start Date End Date Taking?  Authorizing Provider   sildenafil (VIAGRA) 50 MG tablet Take 50 mg by mouth as needed for Erectile Dysfunction   Yes Historical Provider, MD   finasteride (PROPECIA) 1 MG tablet Take 1 mg by mouth daily   Yes Historical Provider, MD   Multiple Vitamins-Minerals (THERAPEUTIC MULTIVITAMIN-MINERALS) tablet Take 1 tablet by mouth daily   Yes Historical Provider, MD   lisinopril (PRINIVIL;ZESTRIL) 20 MG tablet Take 20 mg by mouth daily   Yes Historical Provider, MD   hydroCHLOROthiazide (HYDRODIURIL) 25 MG tablet Take 25 mg by mouth daily   Yes Historical Provider, MD   ibuprofen (ADVIL;MOTRIN) 200 MG tablet Take 400 mg by mouth 2 times daily as needed for Pain   Yes Historical Provider, MD        Current Medications:  Current Facility-Administered Medications   Medication Dose Route Frequency Provider Last Rate Last Admin    lidocaine viscous hcl (XYLOCAINE) 2 % solution 15 mL  15 mL Mouth/Throat TID Milagros Sam MD   15 mL at 01/03/23 0739    guaiFENesin-dextromethorphan (ROBITUSSIN DM) 100-10 MG/5ML syrup 5 mL  5 mL Oral Q4H PRN Pepe Watt MD        sodium chloride flush 0.9 % injection 10 mL  10 mL IntraVENous 2 times per day Uli Bolden MD        sodium chloride flush 0.9 % injection 10 mL  10 mL IntraVENous PRN Uli Bolden MD        0.9 % sodium chloride infusion   IntraVENous PRN Uli Bolden MD        potassium chloride (KLOR-CON M) extended release tablet 40 mEq  40 mEq Oral PRN Uli Bolden MD        Or    potassium bicarb-citric acid (EFFER-K) effervescent tablet 40 mEq  40 mEq Oral PRN Uli Bolden MD        Or    potassium chloride 10 mEq/100 mL IVPB (Peripheral Line)  10 mEq IntraVENous PRN Uli Bolden MD        potassium chloride 10 mEq/100 mL IVPB (Peripheral Line)  10 mEq IntraVENous PRN Uli Bolden MD        magnesium sulfate 2000 mg in 50 mL IVPB premix  2,000 mg IntraVENous PRN Uli Bolden MD        promethazine (PHENERGAN) tablet 12.5 mg  12.5 mg Oral Q6H PRN Uli Bolden MD        Or    ondansetron (ZOFRAN) injection 4 mg  4 mg IntraVENous Q6H PRN Joanna Toth MD        magnesium hydroxide (MILK OF MAGNESIA) 400 MG/5ML suspension 30 mL  30 mL Oral Daily PRN Joanna Toth MD        acetaminophen (TYLENOL) tablet 650 mg  650 mg Oral Q6H PRN Joanna Toth MD   650 mg at 01/02/23 1930    Or    acetaminophen (TYLENOL) suppository 650 mg  650 mg Rectal Q6H PRN Joanna Toth MD        hydroCHLOROthiazide (HYDRODIURIL) tablet 25 mg  25 mg Oral Daily Joanna Toth MD   25 mg at 01/03/23 0814    lisinopril (PRINIVIL;ZESTRIL) tablet 20 mg  20 mg Oral Daily Joanna Toth MD   20 mg at 01/03/23 0814    0.9 % sodium chloride infusion   IntraVENous Continuous Joanna Toth MD 75 mL/hr at 01/02/23 1937 New Bag at 01/02/23 1937    perflutren lipid microspheres (DEFINITY) injection 1.5 mL  1.5 mL IntraVENous ONCE PRN Joanna Toth MD            Allergies:  Bactrim [sulfamethoxazole-trimethoprim]     Review of Systems:   Positive for lightheadedness, dizziness, loss of consciousness  All other systems reviewed and negative    Physical Examination:    Vitals:    01/03/23 1555   BP: 133/83   Pulse: 82   Resp: 18   Temp: 100.4 °F (38 °C)   SpO2: 98%      Weight: 151 lb 0.2 oz (68.5 kg)       Physical Exam  Vitals and nursing note reviewed. Constitutional:       Appearance: Normal appearance. He is not ill-appearing or diaphoretic. HENT:      Head: Normocephalic and atraumatic. Mouth/Throat:      Mouth: Mucous membranes are moist.   Eyes:      Pupils: Pupils are equal, round, and reactive to light. Cardiovascular:      Rate and Rhythm: Normal rate and regular rhythm. Heart sounds: No murmur heard. Pulmonary:      Effort: Pulmonary effort is normal. No respiratory distress. Abdominal:      General: Abdomen is flat. There is no distension. Musculoskeletal:      Right lower leg: No edema. Left lower leg: No edema. Skin:     General: Skin is warm and dry. Neurological:      General: No focal deficit present.       Mental Status: He is alert and oriented to person, place, and time. Psychiatric:         Mood and Affect: Mood normal.         Behavior: Behavior normal.        Labs  CBC:   Lab Results   Component Value Date/Time    WBC 1.9 01/03/2023 05:16 AM    RBC 3.83 01/03/2023 05:16 AM    HGB 10.1 01/03/2023 05:16 AM    HCT 31.0 01/03/2023 05:16 AM    MCV 81.1 01/03/2023 05:16 AM    RDW 16.4 01/03/2023 05:16 AM    PLT 50 01/03/2023 05:16 AM     CMP:    Lab Results   Component Value Date/Time     01/03/2023 05:16 AM    K 3.8 01/03/2023 05:16 AM     01/03/2023 05:16 AM    CO2 25 01/03/2023 05:16 AM    BUN 17 01/03/2023 05:16 AM    CREATININE 1.0 01/03/2023 05:16 AM    LABGLOM >60 01/03/2023 05:16 AM    GLUCOSE 102 01/03/2023 05:16 AM    CALCIUM 8.3 01/03/2023 05:16 AM     PT/INR:  No results found for: PTINR  Lab Results   Component Value Date    TROPONINI <0.01 01/03/2023       EKG:  I have reviewed EKG with the following interpretation:  Impression: Sinus rhythm with a right bundle branch block    Old notes reviewed  Telemetry reviewed  Ekg personally reviewed  Chest xray personally reviewed  Echo, cath, and   Medications and labs reviewed      Assessment/Plan:  Syncope: Sounds like it was vasovagal/orthostatic in nature, by description really does not sound like it was arrhythmia mediated. No significant murmur on examination  -Agree with transthoracic echocardiogram    Atypical chest pain:  Nuclear MPI in a.m.      ---    I will address the patient's cardiac risk factors and adjusted pharmacologic treatment as needed. In addition, I have reinforced the need for patient directed risk factor modification. Tobacco use was discussed with the patient and educated on the negative effects. I have asked the patient to not utilize these agents. Thank you for allowing to us to participate in the [facility] or Alen Bustamante. Further evaluation will be based upon the patient's clinical course and testing results.     All questions and concerns were addressed to the patient/family. Alternatives to my treatment were discussed. The note was completed using EMR. Every effort was made to ensure accuracy; however, inadvertent computerized transcription errors may be present. Megan Copeland MD  Interventional Cardiology  1/3/2023  4:12 PM    [facility], [address]  Ph: [phone]  Fax: [phone]    NOTE: This report was transcribed using voice recognition software. Every effort was made to ensure accuracy, however, inadvertent computerized transcription errors may be present.

## 2023-01-03 NOTE — ED NOTES
Report given to [facility]. No further questions.       Torito Thomas, RN  01/02/23 4548

## 2023-01-03 NOTE — ADT AUTH CERT
[facility] EMERGENCY DEPARTMENT  [address] [place] [address]  [phone]   [id number]    Auth number: N/A    [id number] - (Commercial)    Return Contact Information:  Randi Worley  PU:[phone]  [phone]

## 2023-01-03 NOTE — CONSULTS
[facility]    Consult Note      Requesting Physician:  renay     CHIEF COMPLAINT:  chest pain       HISTORY OF PRESENT ILLNESS:    Mr. Eb Rodarte  is a 45 y.o. male we are seeing in consultation for pancytopenia   The pt has a history of being incarcerated for 16 years   The pt has had an extensive workup at [facility] for pancytopenia-he has had a boen marrow-for an mspike-  THe marrow a few months ago did not diagnose any blood anomaly-he had some plasma cells in the marrow but not enough to cause myeloma or these counts  IT was not known why his counts were low  HE has had some rheum labs that are not normla and is set up to see a rheumatologist at [facility] and his  hematologist referred the pt to dr Maya Rowley for the m protein   Mixed in all this is the pts history of syphillus  The pt states being incarcerated for 16 years he would not have had the chance or history of this but he tells me in [address] he had a test where the health dept apparently found it to be positive but the pt was not treated  THus recently his syphilus tests have been repeated and there is some reactivity  The pt gives a hx of being tx for symphilus recently   I dont see any referrals to ID for this in outpt setting   Im not clear if the pt could have secondary ? Could this related to his issues? Pt states he has a gonzalez rash and has what he describes as mouth sores     ICD-10-CM    1. Syncope and collapse  R55       2. Chest pain, unspecified type  R07.9       3. Sore of lower lip  K13.0       4. Pancytopenia ([address]  K57.829              Past Medical History:  No past medical history on file. ? Positive syphillus     Past Surgical History:  No past surgical history on file.     Current Medications:  Current Facility-Administered Medications   Medication Dose Route Frequency Provider Last Rate Last Admin    lidocaine viscous hcl (XYLOCAINE) 2 % solution 15 mL  15 mL Mouth/Throat TID Lauren Toussaint MD   15 mL at 01/03/23 1949    guaiFENesin-dextromethorphan (ROBITUSSIN DM) 100-10 MG/5ML syrup 5 mL  5 mL Oral Q4H PRN Noe Zamarripa MD        sodium chloride flush 0.9 % injection 10 mL  10 mL IntraVENous 2 times per day Kin Meza MD   10 mL at 01/03/23 1938    sodium chloride flush 0.9 % injection 10 mL  10 mL IntraVENous PRN Kin Meza MD        0.9 % sodium chloride infusion   IntraVENous PRN Kin Meza MD        potassium chloride (KLOR-CON M) extended release tablet 40 mEq  40 mEq Oral PRN Kin Meza MD        Or    potassium bicarb-citric acid (EFFER-K) effervescent tablet 40 mEq  40 mEq Oral PRN Kin Meza MD        Or    potassium chloride 10 mEq/100 mL IVPB (Peripheral Line)  10 mEq IntraVENous PRN Kin Meza MD        potassium chloride 10 mEq/100 mL IVPB (Peripheral Line)  10 mEq IntraVENous PRN Kin Meza MD        magnesium sulfate 2000 mg in 50 mL IVPB premix  2,000 mg IntraVENous PRN Kin Meza MD        promethazine (PHENERGAN) tablet 12.5 mg  12.5 mg Oral Q6H PRN Kin Meza MD        Or    ondansetron (ZOFRAN) injection 4 mg  4 mg IntraVENous Q6H PRN Kin Meza MD        magnesium hydroxide (MILK OF MAGNESIA) 400 MG/5ML suspension 30 mL  30 mL Oral Daily PRN Kin Meza MD        acetaminophen (TYLENOL) tablet 650 mg  650 mg Oral Q6H PRN Kin Meza MD   650 mg at 01/04/23 0126    Or    acetaminophen (TYLENOL) suppository 650 mg  650 mg Rectal Q6H PRN Kin Meza MD        hydroCHLOROthiazide (HYDRODIURIL) tablet 25 mg  25 mg Oral Daily Kin Meza MD   25 mg at 01/03/23 0814    lisinopril (PRINIVIL;ZESTRIL) tablet 20 mg  20 mg Oral Daily Evan Javier MD   20 mg at 01/03/23 0814    0.9 % sodium chloride infusion   IntraVENous Continuous Kin Meza MD 75 mL/hr at 01/03/23 2345 New Bag at 01/03/23 2345    perflutren lipid microspheres (DEFINITY) injection 1.5 mL  1.5 mL IntraVENous ONCE PRN Dave Osman MD           Allergies: Allergies   Allergen Reactions    Bactrim [Sulfamethoxazole-Trimethoprim]      Lip swelling       Social History:  Social History     Socioeconomic History    Marital status: Single     Spouse name: Not on file    Number of children: Not on file    Years of education: Not on file    Highest education level: Not on file   Occupational History    Not on file   Tobacco Use    Smoking status: Not on file    Smokeless tobacco: Not on file   Substance and Sexual Activity    Alcohol use: Not on file    Drug use: Not on file    Sexual activity: Not on file   Other Topics Concern    Not on file   Social History Narrative    Not on file     Social Determinants of Health     Financial Resource Strain: Not on file   Food Insecurity: Not on file   Transportation Needs: Not on file   Physical Activity: Not on file   Stress: Not on file   Social Connections: Not on file   Intimate Partner Violence: Not on file   Housing Stability: Not on file          Family History:  No family history on file.   Review of Systems - General ROS: positive for  - fatigue, fever, and hot flashes  Psychological ROS: negative  Ophthalmic ROS: negative  ENT ROS: positive for - oral lesions  Hematological and Lymphatic ROS: negative  Respiratory ROS: positive for - pleuritic pain  Cardiovascular ROS: positive for - chest pain and loss of consciousness  Gastrointestinal ROS: no abdominal pain, change in bowel habits, or black or bloody stools  Genito-Urinary ROS: no dysuria, trouble voiding, or hematuria  Musculoskeletal ROS: negative  Neurological ROS: no TIA or stroke symptoms       PHYSICAL EXAM:      Vitals:  Patient Vitals for the past 24 hrs:   BP Temp Temp src Pulse Resp SpO2 Weight   01/04/23 0520 -- -- -- -- -- -- 150 lb (68 kg)   01/04/23 0453 (!) 158/87 98.1 °F (36.7 °C) Oral 68 16 100 % --   01/04/23 0115 -- 97.9 °F (36.6 °C) Oral -- -- -- --   01/04/23 0101 (!) 151/87 100.1 °F (37.8 °C) Oral 72 18 100 % --   01/03/23 2009 125/81 99.2 °F (37.3 °C) Oral 79 16 98 % --   01/03/23 1800 -- (!) 100.5 °F (38.1 °C) Oral -- -- -- --   01/03/23 1555 133/83 100.4 °F (38 °C) Oral 82 18 98 % --   01/03/23 1515 122/82 -- -- 76 17 100 % --   01/03/23 1500 125/80 -- -- 83 21 100 % --   01/03/23 1330 (!) 138/98 -- -- 89 19 -- --   01/03/23 0830 130/82 -- -- 81 20 100 % --   01/03/23 0815 135/87 -- -- (!) 101 12 98 % --   01/03/23 0814 135/87 -- -- -- -- -- --           Physical Exam   Cons -awake oriented x 3  HEENT perrl eomi anicteric-+apthous ulcers   NEck supple no jvd  CV RRR  Lungs -clear   Abd soft no hsm  Ext no c/c/e     DATA:    PT/INR:  No results for input(s): PROT, INR in the last 720 hours. PTT:  No results for input(s): APTT in the last 720 hours. CMP:    Recent Labs     01/04/23  0601      K 4.1      CO2 26   BUN 11     Mg:  No results for input(s): MG in the last 720 hours. Lab Results   Component Value Date    CALCIUM 8.3 01/04/2023       CBC:    Recent Labs     01/03/23  0516 01/02/23  1212   WBC 1.9* 2.7*   NEUTROABS 0.5* 0.8*   LYMPHOPCT 37.1 24.0   RBC 3.83* 4.50   HGB 10.1* 11.7*   HCT 31.0* 37.0*   MCV 81.1 82.3   MCH 26.3 26.1   MCHC 32.4 31.7   RDW 16.4* 16.0*   PLT 50* 68*        LDH:No results for input(s): LDH in the last 720 hours. Radiology Review:  MRI BRAIN W WO CONTRAST  Narrative: EXAMINATION:  MRI OF THE BRAIN WITHOUT AND WITH CONTRAST  1/3/2023 12:34 pm    TECHNIQUE:  Multiplanar multisequence MRI of the head/brain was performed without and  with the administration of intravenous contrast.    COMPARISON:  None.     HISTORY:  ORDERING SYSTEM PROVIDED HISTORY: hx syphilis, syncope, persistent  lightheadedness/dizziness, vertiginous symptoms  TECHNOLOGIST PROVIDED HISTORY:  Reason for exam:->hx syphilis, syncope, persistent lightheadedness/dizziness,  vertiginous symptoms  Reason for Exam: hx syphilis, syncope, persistent lightheadedness/dizziness,  vertiginous symptoms    Initial evaluation. FINDINGS:  INTRACRANIAL STRUCTURES/VENTRICLES:  There is no acute infarct. No mass  effect or midline shift. No evidence of an acute intracranial hemorrhage. The ventricles and sulci are normal in size and configuration. The  sellar/suprasellar regions appear unremarkable. The normal signal voids  within the major intracranial vessels appear maintained. No abnormal focus  of enhancement is seen within the brain. ORBITS: The visualized portion of the orbits demonstrate no acute abnormality. SINUSES: The visualized paranasal sinuses and mastoid air cells demonstrate  no acute abnormality. BONES/SOFT TISSUES: The bone marrow signal intensity appears normal. The soft  tissues demonstrate no acute abnormality. Impression: No acute intracranial abnormality. No acute infarct. Problem List  Patient Active Problem List   Diagnosis    Chest pain       IMPRESSION/RECOMMENDATIONS:    Pancytopenia   Pt has had an extensive workup for pancytopenia   He has an mgus-marrow a few montsh back did not have a reason for his low counts   The mgus is being followed with out major changes   He has had some autoimmune labs come back abnormal in his workup at [facility] and has been referred to rheum for this -autoimmune issues can cause low counts   There is also a history of being positive for symphillis that was never treated for years-? This history is a bit confusing but it does seem  he was positive for this -it does not seem to be false positive and apparently had in 96 but not treated   Is there a potential for a secondary/tertiary problem related to syphillus-its so rarely seen im not the expert on this -? Consider ID opinion -could it relate ? I dont think a new marrow is needed  Ulcers in mouth again ?  Autoimmune in nature   Rheumo workup really needed but cant to inpt   Not sure if id should comment on this case will discuss with hosptialist

## 2023-01-03 NOTE — PROGRESS NOTES
Hospitalist Progress Note      PCP: JALEEL Gonzalez CNP    Date of Admission: 1/2/2023    Chief Complaint: syncope and collapse    Hospital Course: Patient is a 49-year-old male who presents to the hospital after an episode of loss of consciousness. According to patient he was at work he felt dizzy lightheaded before the episode. He denies chest pain shortness of breath nausea vomiting. He mentioned he felt sweaty before the event. Patient denied fever chills diarrhea constipation dysuria. He also reports he has chest pain on the right side of her chest, denies active chest pain at time of my evaluation, he has not noticed any aggravating or relieving factors. He has established hematologist for his pancytopenia, he was diagnosed with syphilis in the past, he had bone marrow biopsy as outpatient. Subjective: Pt seen and examined. Continues to complain of lightheadedness and dizziness, especially with movement. Orthostatic vitals negative. Medications:  Reviewed    Infusion Medications    sodium chloride      sodium chloride 75 mL/hr at 01/02/23 1937     Scheduled Medications    lidocaine viscous hcl  15 mL Mouth/Throat TID    sodium chloride flush  10 mL IntraVENous 2 times per day    hydroCHLOROthiazide  25 mg Oral Daily    lisinopril  20 mg Oral Daily     PRN Meds: sodium chloride flush, sodium chloride, potassium chloride **OR** potassium alternative oral replacement **OR** potassium chloride, potassium chloride, magnesium sulfate, promethazine **OR** ondansetron, magnesium hydroxide, acetaminophen **OR** acetaminophen, perflutren lipid microspheres    No intake or output data in the 24 hours ending 01/03/23 1046    Exam:    /82   Pulse 81   Temp 98.9 °F (37.2 °C) (Oral)   Resp 20   Ht 5' 9\" (1.753 m)   Wt 151 lb 0.2 oz (68.5 kg)   SpO2 100%   BMI 22.30 kg/m²     General appearance: No apparent distress, appears stated age and cooperative.   HEENT: Pupils equal, round, and reactive to light. Conjunctivae/corneas clear. Neck: Supple, with full range of motion. No jugular venous distention. Trachea midline. Respiratory:  Normal respiratory effort. Clear to auscultation, bilaterally without Rales/Wheezes/Rhonchi. Cardiovascular: Regular rate and rhythm with normal S1/S2 without murmurs, rubs or gallops. Abdomen: Soft, non-tender, non-distended with normal bowel sounds. Musculoskeletal: No clubbing, cyanosis or edema bilaterally. Full range of motion without deformity. Skin: Skin color, texture, turgor normal.  No rashes or lesions. Neurologic:  Neurovascularly intact without any focal sensory/motor deficits. Cranial nerves: II-XII intact, grossly non-focal.  Psychiatric: Alert and oriented, thought content appropriate, normal insight  Capillary Refill: Brisk,< 3 seconds   Peripheral Pulses: +2 palpable, equal bilaterally       Labs:   Recent Labs     01/02/23  1212 01/03/23  0516   WBC 2.7* 1.9*   HGB 11.7* 10.1*   HCT 37.0* 31.0*   PLT 68* 50*     Recent Labs     01/02/23  1212 01/03/23  0516    138   K 4.2 3.8    104   CO2 27 25   BUN 17 17   CREATININE 1.1 1.0   CALCIUM 9.7 8.3     No results for input(s): AST, ALT, BILIDIR, BILITOT, ALKPHOS in the last 72 hours. No results for input(s): INR in the last 72 hours. Recent Labs     01/02/23  1212 01/03/23  0254 01/03/23  0659   TROPONINI <0.01 <0.01 <0.01       Urinalysis:    No results found for: Irina Ha, BACTERIA, RBCUA, BLOODU, SPECGRAV, GLUCOSEU    Radiology:  XR CHEST (2 VW)   Final Result   No radiographic evidence of an acute cardiopulmonary process. CT HEAD WO CONTRAST   Final Result   No acute intracranial abnormality. CT CERVICAL SPINE WO CONTRAST   Final Result   No acute abnormality of the cervical spine.          MRI BRAIN W WO CONTRAST    (Results Pending)           Assessment/Plan:    Active Hospital Problems    Diagnosis Date Noted    Chest pain [R07.9] 01/02/2023 Priority: Medium       Assessment  Syncope and collapse   Vertigo  Chest pain rule out ACS  Pancytopenia - see heme/onc as an outpatient - recently referred to Dr. Alber Saldana with [facility] for cryodysplasia workup  Hx syphilis     Plan  Check orthostatic vitals - negative  monitor troponin  Monitor on cardiac telemetry  Consult cardiology  Consult hematology  Check echocardiogram  Check MRI Brain  Holding chemical DVT prophylaxis in the setting of thrombocytopenia DVT prophylaxis-SCDs for now  Resume home medications    DVT Prophylaxis: SCDs  Diet: ADULT DIET;  Regular  Code Status: Full Code    PT/OT Eval Status: ordered    Dispo - continue care    Bella Cockayne, MD

## 2023-01-03 NOTE — ED NOTES
ED SBAR report provided to Josue cross RN. Patient to be transported to Room 4253 via stretcher by  Transportation. Transported with IV meds infusing. IV site clean, dry, and intact. MEWS score and pain assessed and documented. Updated pt on plan of care.      Jenna Salazar RN  01/03/23 2112

## 2023-01-03 NOTE — CARE COORDINATION
SW attempted to meet with patient at bedside but he was away for testing. NEISHA will check back momentarily. Respectfully submitted,    RANDI GoinsS  [facility]   [phone]    Electronically signed by CAMERON Arcos on 1/3/2023 at 12:46 PM

## 2023-01-03 NOTE — PROGRESS NOTES
Pt arrived to room 4253 from ED. Pt is A&Ox4. Pt denies chest pain or dizziness. Pt denies need for bed alarm. Call light in reach and fall precautions in place.  Electronically signed by Mikaela Campo RN on 1/3/2023 at 3:57 PM

## 2023-01-03 NOTE — CARE COORDINATION
INITIAL CASE MANAGEMENT ASSESSMENT     Reviewed chart, met with patient to assess possible discharge needs. Explained Case Management      Living Situation: Patient reports that he resides alone in a second floor apartment home with no pets. Prior to medical admission he reports that he has no trouble entering or exiting the property. ADLs: Patient reports that prior to medical admission he reported that he was independent. He stated that he doesn't anticipate any needs at discharge. DME: Patient reports that prior to medical admission he used no durable medical equipment. He stated that he doesn't anticipate any needs at discharge. PT/OT Recs: Ordered. We will follow for needs until recommendations are made regarding his care. Active Services: Patient reports that prior to medical admission he was working with [facility] therapy. He stated that he doesn't anticipate any needs at discharge. Transportation: Patient reports that prior to medical admission patient reports that family transported him o and from appointments and errands and would likely transport him home at discharge. Medications: Patient receives medications from [address] [place] [address] . At this time there are no issues with access or affordability. PCP: JALEEL Caban [phone] (phone) and [phone] (fax number). Patient reports that his last appointment with this provider was September of 2022. PLAN/COMMENTS:   1) Cardiology clearance, hematology and therapy recs. SW discussed with patient that we do not offer ongoing psych monitoring and care at [facility]. If MD believes that patient will need to transition to psych versus home we will return to bedside to discuss the next steps. SW provided contact information for patient or family to call with any questions. SW will follow and assist as needed. Respectfully submitted,     Rosemarie Nice MSW, LISW-S  [facility]   [phone]    Electronically signed by CAMERON Hurst on 1/3/2023 at 2:27 PM

## 2023-01-03 NOTE — ED NOTES
Family Medicine Residency  Edgardo Sevilla MD    Subjective:     Olivia Walden is a 36 y.o. male who presents for follow-up for hypertension and the acute complaint of ankle pain.    Patient reports that he has had bilateral ankle pain that he describes as aching for the past 3 to 4 months.  He reports that is relieved with aspirin.  Worsened with standing.  Patient stands for long hours at his job.  His history is remarkable for a traumatic football injury that now has an intramedullary gerardo in his tibia placed on the right side.  Pain is symmetrical from left to right.  He is taken meloxicam in the past for ankle pain found to be very helpful.  He has bilateral ankle pain on the anterior and medial and lateral aspects of the ankle joint.    Hypertension: Patient reports compliance with current blood pressure medications which are amlodipine 5 mg and hydrochlorothiazide 12.5 mg daily.  He does not check his blood pressure at home.  He denies any orthostatic symptoms but does report erectile dysfunction when he is compliant with his medications.  He reports this is placing a large strain on his current relationship.    The following portions of the patient's history were reviewed and updated as appropriate: allergies, current medications, past family history, past medical history, past social history, past surgical history and problem list.    Past Medical Hx:  Past Medical History:   Diagnosis Date   • Depression     PHQ score 10, 4/11/16   • Hypertension    • Pneumonia        Past Surgical Hx:  Past Surgical History:   Procedure Laterality Date   • FRACTURE SURGERY Right 04/21/2019    ORIF of R tibia   • TONSILLECTOMY  1988       Current Meds:    Current Outpatient Medications:   •  albuterol sulfate  (90 Base) MCG/ACT inhaler, 2 puff(s) 15 minutes before exercise, Disp: 18 g, Rfl: 0  •  hydrochlorothiazide (HYDRODIURIL) 12.5 MG tablet, Take 1 tablet by mouth Daily., Disp: 30 tablet, Rfl: 3  •   Report given to [facility] JANINA GARCES. All questions answered.       Barb Renteria RN  01/03/23 0943 "meloxicam (MOBIC) 15 MG tablet, Take 1 tablet by mouth Daily. Take with meal daily, Disp: 30 tablet, Rfl: 3    Allergies:  No Known Allergies    Family Hx:  Family History   Problem Relation Age of Onset   • Diabetes Paternal Aunt    • Hypertension Neg Hx    • Pancreatic cancer Neg Hx    • Cancer Neg Hx         Social History:  Social History     Socioeconomic History   • Marital status: Single     Spouse name: Not on file   • Number of children: Not on file   • Years of education: Not on file   • Highest education level: Not on file   Tobacco Use   • Smoking status: Never Smoker   • Smokeless tobacco: Never Used   Substance and Sexual Activity   • Alcohol use: Yes     Comment: social (1-2 drinks/week)   • Drug use: No   • Sexual activity: Defer       Review of Systems  Review of Systems   Constitutional: Negative for chills and fatigue.   HENT: Negative for congestion and sore throat.    Eyes: Negative for pain and redness.   Respiratory: Negative for cough and shortness of breath.    Gastrointestinal: Negative for abdominal pain and constipation.   Endocrine: Negative for polydipsia and polyphagia.   Genitourinary: Negative for dysuria and frequency.   Musculoskeletal: Positive for arthralgias. Negative for joint swelling.   Skin: Negative for rash and wound.   Neurological: Negative for dizziness, weakness and headaches.   Psychiatric/Behavioral: Negative for agitation and confusion. The patient is nervous/anxious.        Objective:     /80   Pulse (!) 126   Temp 97.6 °F (36.4 °C)   Ht 180.3 cm (71\")   Wt (!) 145 kg (320 lb)   SpO2 95%   BMI 44.63 kg/m²   Physical Exam  Vitals signs and nursing note reviewed.   Constitutional:       General: He is not in acute distress.     Appearance: He is well-developed.   HENT:      Head: Normocephalic and atraumatic.   Eyes:      Conjunctiva/sclera: Conjunctivae normal.   Neck:      Vascular: No JVD.   Cardiovascular:      Rate and Rhythm: Normal rate and regular " rhythm.      Heart sounds: Normal heart sounds. No murmur. No friction rub. No gallop.    Pulmonary:      Effort: Pulmonary effort is normal. No respiratory distress.      Breath sounds: Normal breath sounds. No wheezing or rales.   Abdominal:      General: Bowel sounds are normal. There is no distension.      Palpations: Abdomen is soft.      Tenderness: There is no abdominal tenderness.   Musculoskeletal:      Comments: Tenderness to palpation along the joint line of the ankles bilaterally on the anterior medial and lateral aspects.  No bony or point tenderness noted.  Neurovascularly intact bilaterally.   Skin:     General: Skin is warm and dry.      Capillary Refill: Capillary refill takes less than 2 seconds.   Neurological:      Mental Status: He is alert and oriented to person, place, and time.          Assessment/Plan:     Diagnoses and all orders for this visit:    1. Essential hypertension (Primary)  -     Comprehensive metabolic panel; Future  -     CBC No Differential; Future  -     Hemoglobin A1c; Future    2. Chronic ankle pain, bilateral  -     meloxicam (MOBIC) 15 MG tablet; Take 1 tablet by mouth Daily. Take with meal daily  Dispense: 30 tablet; Refill: 3        · Rx changes: Restarted meloxicam for chronic ankle pain.  Discontinued amlodipine due to suspected side effect of erectile dysfunction.  Labs ordered as there are no recent lab work on file.  Once kidney function is assessed we will plan to start losartan.  If his erectile dysfunction does not improve with this switch in blood pressure medications we will reevaluate in approximately 1 month and consider symptomatic treatment for the erectile dysfunction at that time.  · Patient Education: Home blood pressure monitoring  · Compliance at present is estimated to be good.   · Efforts to improve compliance (if necessary) will be directed at increased exercise.     Follow-up:     Return in about 1 month (around 3/2/2021).    Preventative:  Health  Maintenance   Topic Date Due   • HEPATITIS C SCREENING  09/13/2016   • ANNUAL PHYSICAL  09/28/2019   • INFLUENZA VACCINE  08/01/2020   • TDAP/TD VACCINES (3 - Td) 04/11/2026   • Pneumococcal Vaccine 0-64  Aged Out   • MENINGOCOCCAL VACCINE  Aged Out     Male Preventative: Exercises regularly  Recommended: none  Vaccine Counseling: N/A    Weight  -Class: Obese Class III extreme obesity: > or equal to 40kg/m2  -Patient's There is no height or weight on file to calculate BMI. BMI is above normal parameters. Recommendations include: nutrition counseling.   eat more fruits and vegetables    Alcohol use:  reports current alcohol use.  Nicotine status  reports that he has never smoked. He has never used smokeless tobacco.    Goals        Patient Stated    • lose 10 lbs by follow-up (pt-stated)      Barriers:  none            RISK SCORE: 4      This document has been electronically signed by Edgardo Sevilla MD on February 2, 2021 15:13 CST

## 2023-01-04 VITALS
SYSTOLIC BLOOD PRESSURE: 131 MMHG | HEIGHT: 69 IN | DIASTOLIC BLOOD PRESSURE: 80 MMHG | RESPIRATION RATE: 18 BRPM | OXYGEN SATURATION: 98 % | HEART RATE: 72 BPM | BODY MASS INDEX: 22.22 KG/M2 | WEIGHT: 150 LBS | TEMPERATURE: 98.2 F

## 2023-01-04 PROBLEM — R55 SYNCOPE AND COLLAPSE: Status: ACTIVE | Noted: 2023-01-04

## 2023-01-04 LAB
ANION GAP SERPL CALCULATED.3IONS-SCNC: 6 MMOL/L (ref 3–16)
BACTERIA: NORMAL /HPF
BILIRUBIN URINE: NEGATIVE
BLOOD, URINE: NEGATIVE
BUN BLDV-MCNC: 11 MG/DL (ref 7–20)
CALCIUM SERPL-MCNC: 8.3 MG/DL (ref 8.3–10.6)
CHLORIDE BLD-SCNC: 106 MMOL/L (ref 99–110)
CLARITY: CLEAR
CO2: 26 MMOL/L (ref 21–32)
COLOR: YELLOW
CREAT SERPL-MCNC: 0.7 MG/DL (ref 0.9–1.3)
EPITHELIAL CELLS, UA: 0 /HPF (ref 0–5)
GFR SERPL CREATININE-BSD FRML MDRD: >60 ML/MIN/{1.73_M2}
GLUCOSE BLD-MCNC: 89 MG/DL (ref 70–99)
GLUCOSE URINE: NEGATIVE MG/DL
HYALINE CASTS: 0 /LPF (ref 0–8)
KETONES, URINE: NEGATIVE MG/DL
LEUKOCYTE ESTERASE, URINE: NEGATIVE
MICROSCOPIC EXAMINATION: YES
NITRITE, URINE: NEGATIVE
PH UA: 6.5 (ref 5–8)
POTASSIUM REFLEX MAGNESIUM: 4.1 MMOL/L (ref 3.5–5.1)
PROTEIN UA: ABNORMAL MG/DL
RBC UA: 2 /HPF (ref 0–4)
REPORT: NORMAL
RESPIRATORY PANEL PCR: NORMAL
SODIUM BLD-SCNC: 138 MMOL/L (ref 136–145)
SPECIFIC GRAVITY UA: 1.02 (ref 1–1.03)
URINE TYPE: ABNORMAL
UROBILINOGEN, URINE: 0.2 E.U./DL
WBC UA: 0 /HPF (ref 0–5)

## 2023-01-04 PROCEDURE — 80048 BASIC METABOLIC PNL TOTAL CA: CPT

## 2023-01-04 PROCEDURE — 85025 COMPLETE CBC W/AUTO DIFF WBC: CPT

## 2023-01-04 PROCEDURE — 78452 HT MUSCLE IMAGE SPECT MULT: CPT

## 2023-01-04 PROCEDURE — 99232 SBSQ HOSP IP/OBS MODERATE 35: CPT | Performed by: NURSE PRACTITIONER

## 2023-01-04 PROCEDURE — 93017 CV STRESS TEST TRACING ONLY: CPT

## 2023-01-04 PROCEDURE — 6370000000 HC RX 637 (ALT 250 FOR IP): Performed by: INTERNAL MEDICINE

## 2023-01-04 PROCEDURE — 2580000003 HC RX 258: Performed by: INTERNAL MEDICINE

## 2023-01-04 PROCEDURE — 6360000002 HC RX W HCPCS: Performed by: STUDENT IN AN ORGANIZED HEALTH CARE EDUCATION/TRAINING PROGRAM

## 2023-01-04 PROCEDURE — 3430000000 HC RX DIAGNOSTIC RADIOPHARMACEUTICAL

## 2023-01-04 PROCEDURE — 93306 TTE W/DOPPLER COMPLETE: CPT

## 2023-01-04 PROCEDURE — A9502 TC99M TETROFOSMIN: HCPCS | Performed by: STUDENT IN AN ORGANIZED HEALTH CARE EDUCATION/TRAINING PROGRAM

## 2023-01-04 PROCEDURE — 0202U NFCT DS 22 TRGT SARS-COV-2: CPT

## 2023-01-04 PROCEDURE — 36415 COLL VENOUS BLD VENIPUNCTURE: CPT

## 2023-01-04 PROCEDURE — 81001 URINALYSIS AUTO W/SCOPE: CPT

## 2023-01-04 PROCEDURE — A9502 TC99M TETROFOSMIN: HCPCS

## 2023-01-04 PROCEDURE — 97530 THERAPEUTIC ACTIVITIES: CPT

## 2023-01-04 PROCEDURE — 3430000000 HC RX DIAGNOSTIC RADIOPHARMACEUTICAL: Performed by: STUDENT IN AN ORGANIZED HEALTH CARE EDUCATION/TRAINING PROGRAM

## 2023-01-04 RX ORDER — LIDOCAINE HYDROCHLORIDE 20 MG/ML
15 SOLUTION OROPHARYNGEAL 3 TIMES DAILY
Qty: 360 ML | Refills: 0 | Status: SHIPPED | OUTPATIENT
Start: 2023-01-04

## 2023-01-04 RX ADMIN — REGADENOSON 0.4 MG: 0.08 INJECTION, SOLUTION INTRAVENOUS at 08:35

## 2023-01-04 RX ADMIN — ACETAMINOPHEN 325MG 650 MG: 325 TABLET ORAL at 01:26

## 2023-01-04 RX ADMIN — TETROFOSMIN 30 MILLICURIE: 1.38 INJECTION, POWDER, LYOPHILIZED, FOR SOLUTION INTRAVENOUS at 08:31

## 2023-01-04 RX ADMIN — LIDOCAINE HYDROCHLORIDE 15 ML: 20 SOLUTION ORAL at 10:07

## 2023-01-04 RX ADMIN — HYDROCHLOROTHIAZIDE 25 MG: 25 TABLET ORAL at 10:07

## 2023-01-04 RX ADMIN — LISINOPRIL 20 MG: 20 TABLET ORAL at 10:07

## 2023-01-04 RX ADMIN — TETROFOSMIN 10 MILLICURIE: 1.38 INJECTION, POWDER, LYOPHILIZED, FOR SOLUTION INTRAVENOUS at 07:18

## 2023-01-04 RX ADMIN — Medication 10 ML: at 10:08

## 2023-01-04 NOTE — DISCHARGE INSTRUCTIONS
Vasovagal Syncope: Care Instructions  Your Care Instructions     Vasovagal syncope (say \"hsi-zbt-RXOJuice GIRALDO-kuh-pee\")is sudden dizziness or fainting that can be set off by things such as pain, stress, fear, or trauma. You may sweat or feel lightheaded, sick to your stomach, or tingly. The problem causes the heart rate to slow and the blood vessels to widen, or dilate, for a short time. When this happens, blood pools in the lower body, and less blood goes to the brain. You can usually get relief by lying down with your legs raised (elevated). This helps more blood to flow to your brain and may help relieve symptoms like feeling dizzy. Some doctors may recommend a technique that involves tensing your fists and arms. This type of fainting is often easy to predict. For example, it happens to some people when they see blood or have to get a shot. They may feel symptoms before they faint. An episode of vasovagal syncope usually responds well to self-care. Other treatment often isn't needed. But if the fainting keeps happening, your doctor may suggest further treatments. Follow-up care is a key part of your treatment and safety. Be sure to make and go to all appointments, and call your doctor if you are having problems. It's also a good idea to know your test results and keep a list of the medicines you take. How can you care for yourself at home? Drink plenty of fluids to prevent dehydration. If you have kidney, heart, or liver disease and have to limit fluids, talk with your doctor before you increase your fluid intake. Try to avoid things that you think may set off vasovagal syncope. Talk to your doctor about any medicines you take. Some medicines may increase the chance of this condition occurring. If you feel symptoms, lie down with your legs raised. Talk to your doctor about what to do if your symptoms come back. When should you call for help? Call 911 anytime you think you may need emergency care. For example, call if:    You have symptoms of a heart problem. These may include:  Chest pain or pressure. Severe trouble breathing. A fast or irregular heartbeat. Watch closely for changes in your health, and be sure to contact your doctor if:    You have more episodes of fainting at home. You do not get better as expected. Where can you learn more? Go to http://www.woods.com/ and enter L754 to learn more about \"Vasovagal Syncope: Care Instructions. \"  Current as of: March 9, 2022               Content Version: 13.5  © 2677-8548 [employer]. Care instructions adapted under license by [facility] ([facility]). If you have questions about a medical condition or this instruction, always ask your healthcare professional. [address] any warranty or liability for your use of this information. Dehydration: Care Instructions  Your Care Instructions  Dehydration happens when your body loses too much fluid. This might happen when you do not drink enough water or you lose large amounts of fluids from your body because of diarrhea, vomiting, or sweating. Severe dehydration can be life-threatening. Water and minerals called electrolytes help put your body fluids back in balance. Learn the early signs of fluid loss, and drink more fluids to prevent dehydration. Follow-up care is a key part of your treatment and safety. Be sure to make and go to all appointments, and call your doctor if you are having problems. It's also a good idea to know your test results and keep a list of the medicines you take. How can you care for yourself at home? Drink plenty of fluids. Choose water and other clear liquids until you feel better. If you have kidney, heart, or liver disease and have to limit fluids, talk with your doctor before you increase the amount of fluids you drink.   If you do not feel like eating or drinking, try taking small sips of water, sports drinks, or other rehydration drinks. Get plenty of rest.  To prevent dehydration  Add more fluids to your diet and daily routine, unless your doctor has told you not to. During hot weather, drink more fluids. Drink even more fluids if you exercise a lot. Stay away from drinks with alcohol. Watch for the symptoms of dehydration. These include:  A dry, sticky mouth. Not much urine. Dry and sunken eyes. Feeling very tired. Learn what problems can lead to dehydration. These include:  Diarrhea, fever, and vomiting. Any illness with a fever, such as pneumonia or the flu. Activities that cause heavy sweating, such as endurance races and heavy outdoor work in hot or humid weather. Certain medicines, such as cold and allergy pills (antihistamines), pills that remove water from the body (diuretics), and laxatives. Certain diseases, such as diabetes, cancer, and heart or kidney disease. When should you call for help? Call 911 anytime you think you may need emergency care. For example, call if:    You passed out (lost consciousness). Call your doctor now or seek immediate medical care if:    You are confused and cannot think clearly. You are dizzy or lightheaded, or you feel like you may faint. You have signs of needing more fluids. You have sunken eyes, a dry mouth, and you pass only a little urine. You cannot keep fluids down. You have diarrhea that lasts for more than a few days. Watch closely for changes in your health, and be sure to contact your doctor if:    You are not making tears. Your skin is very dry and sags slowly back into place after you pinch it. Your mouth and eyes are very dry. Where can you learn more? Go to http://www.woods.com/ and enter Q814 to learn more about \"Dehydration: Care Instructions. \"  Current as of: March 9, 2022               Content Version: 13.5  © 7111-3349 [employer]. Care instructions adapted under license by [facility] ([facility]).  If you have questions about a medical condition or this instruction, always ask your healthcare professional. [place] [address] any warranty or liability for your use of this information.

## 2023-01-04 NOTE — CARE COORDINATION
Discharge Planning:  SW spoke with pt to discuss d/c planning.  Pt confirmed the plan is for pt to return home upon d/c.  Pt reported his dgtr or neighbor will transport him home at d/c.  Plan: Pt will return home upon d/c.  Pts dgtr or neighbor will transport pt home at d/c.  No d/c needs identified at this time.   BENOIT Díaz  [phone]  Electronically signed by Remedios Hurst on 1/4/2023 at 10:55 AM

## 2023-01-04 NOTE — DISCHARGE SUMMARY
Hospital Medicine Discharge Summary    Patient ID: Les Mays      Patient's PCP: Vania Bautista, APRN - CNP    Admit Date: 1/2/2023     Discharge Date:   1/4/2023    Admitting Physician: Yara Zimmerman MD     Discharge Physician: Angelica Mishra MD     Discharge Diagnoses: Active Hospital Problems    Diagnosis Date Noted    Syncope and collapse [R55] 01/04/2023     Priority: Medium    Chest pain [R07.9] 01/02/2023     Priority: Medium       The patient was seen and examined on day of discharge and this discharge summary is in conjunction with any daily progress note from day of discharge. Hospital Course: Patient is a 52-year-old male who presents to the hospital after an episode of loss of consciousness. According to patient he was at [facility] and he felt dizzy lightheaded before the episode. He denies chest pain shortness of breath nausea vomiting. He mentioned he felt sweaty before the event. Patient denied fever chills diarrhea constipation dysuria. He also reports he has chest pain on the right side of her chest, denies active chest pain at time of my evaluation, he has not noticed any aggravating or relieving factors. He has established hematologist for his pancytopenia, he was diagnosed with syphilis in the past, he had bone marrow biopsy as an outpatient. Patient's had an extensive workup including a stress test, Echocardiogram, MRI Brain without contrast, tele monitoring, and viral serological workup. All testing was unremarkable or negative. His orthostatic vitals were negative. Cardiology was consulted and didn't believe his syncopal episode was cardiac in etiology. Hematology was consulted for the patient's pancytopenia and didn't recommend any additional inpatient workup an d also didn't believe it was a contributing factor to the patient's syncopal episode.        Exam:     /80   Pulse 72   Temp 98.2 °F (36.8 °C) (Oral)   Resp 18   Ht 5' 9\" (1.753 m)   Wt 150 lb (68 kg)   SpO2 98%   BMI 22.15 kg/m²       General appearance:  No apparent distress, appears stated age and cooperative. HEENT:  Normal cephalic, atraumatic without obvious deformity. Pupils equal, round, and reactive to light. Extra ocular muscles intact. Conjunctivae/corneas clear. Neck: Supple, with full range of motion. No jugular venous distention. Trachea midline. Respiratory:  Normal respiratory effort. Clear to auscultation, bilaterally without Rales/Wheezes/Rhonchi. Cardiovascular:  Regular rate and rhythm with normal S1/S2 without murmurs, rubs or gallops. Abdomen: Soft, non-tender, non-distended with normal bowel sounds. Musculoskeletal:  No clubbing, cyanosis or edema bilaterally. Full range of motion without deformity. Skin: Skin color, texture, turgor normal.  No rashes or lesions. Neurologic:  Neurovascularly intact without any focal sensory/motor deficits. Cranial nerves: II-XII intact, grossly non-focal.  Psychiatric:  Alert and oriented, thought content appropriate, normal insight  Capillary Refill: Brisk,< 3 seconds   Peripheral Pulses: +2 palpable, equal bilaterally       Labs: For convenience and continuity at follow-up the following most recent labs are provided:      CBC:    Lab Results   Component Value Date/Time    WBC 1.7 01/04/2023 06:01 AM    HGB 10.3 01/04/2023 06:01 AM    HCT 33.4 01/04/2023 06:01 AM    PLT 47 01/04/2023 06:01 AM       Renal:    Lab Results   Component Value Date/Time     01/04/2023 06:01 AM    K 4.1 01/04/2023 06:01 AM     01/04/2023 06:01 AM    CO2 26 01/04/2023 06:01 AM    BUN 11 01/04/2023 06:01 AM    CREATININE 0.7 01/04/2023 06:01 AM    CALCIUM 8.3 01/04/2023 06:01 AM         Significant Diagnostic Studies    Radiology:   NM MYOCARDIAL SPECT REST EXERCISE OR RX   Final Result      MRI BRAIN W WO CONTRAST   Final Result   No acute intracranial abnormality. No acute infarct.          XR CHEST (2 VW)   Final Result   No radiographic evidence of an acute cardiopulmonary process. CT HEAD WO CONTRAST   Final Result   No acute intracranial abnormality. CT CERVICAL SPINE WO CONTRAST   Final Result   No acute abnormality of the cervical spine. Consults:     IP CONSULT TO CARDIOLOGY  IP CONSULT TO ONCOLOGY    Disposition:  home     Condition at Discharge: Stable    Discharge Instructions/Follow-up:  meds as prescribed, follow-up with PCP    Code Status:  Full Code     Activity: activity as tolerated    Diet: regular diet      Discharge Medications:     Current Discharge Medication List             Details   lidocaine viscous hcl (XYLOCAINE) 2 % SOLN solution Take 15 mLs by mouth in the morning, at noon, and at bedtime  Qty: 360 mL, Refills: 0                Details   sildenafil (VIAGRA) 50 MG tablet Take 50 mg by mouth as needed for Erectile Dysfunction      finasteride (PROPECIA) 1 MG tablet Take 1 mg by mouth daily      Multiple Vitamins-Minerals (THERAPEUTIC MULTIVITAMIN-MINERALS) tablet Take 1 tablet by mouth daily      lisinopril (PRINIVIL;ZESTRIL) 20 MG tablet Take 20 mg by mouth daily      hydroCHLOROthiazide (HYDRODIURIL) 25 MG tablet Take 25 mg by mouth daily      ibuprofen (ADVIL;MOTRIN) 200 MG tablet Take 400 mg by mouth 2 times daily as needed for Pain             Time Spent on discharge is more than 30 minutes in the examination, evaluation, counseling and review of medications and discharge plan. Signed:    Mando Clayton MD   1/4/2023      Thank you JALEEL Thakur CNP for the opportunity to be involved in this patient's care. If you have any questions or concerns please feel free to contact me at [phone]. [Follow-Up: _____] : a [unfilled] follow-up visit

## 2023-01-04 NOTE — PROGRESS NOTES
Occupational Therapy  Deveron Schlatter    OT order noted and chart reviewed. Pt met b/s, he was about to leave for a stress test. He reports feeling back to his baseline and not having any more dizzy spells. He donned his shoes sitting EOB and completed sit<>stand, stood x8 mins at the bedside, and ambulated to w/c independently. No acute OT needs at this time, will sign off.     Total time: 10 mins    Electronically signed by Elvi Holman OT on 1/4/23 at 7:53 AM EST

## 2023-01-04 NOTE — PLAN OF CARE
Problem: Discharge Planning  Goal: Discharge to home or other facility with appropriate resources  Outcome: Progressing     Problem: Pain  Goal: Verbalizes/displays adequate comfort level or baseline comfort level  Outcome: Progressing     Problem: Safety - Adult  Goal: Free from fall injury  Outcome: Progressing     Problem: Neurosensory - Adult  Goal: Achieves stable or improved neurological status  Outcome: Progressing  Goal: Achieves maximal functionality and self care  Outcome: Progressing     Problem: Respiratory - Adult  Goal: Achieves optimal ventilation and oxygenation  Outcome: Progressing     Problem: Cardiovascular - Adult  Goal: Maintains optimal cardiac output and hemodynamic stability  Outcome: Progressing  Goal: Absence of cardiac dysrhythmias or at baseline  Outcome: Progressing     Problem: Skin/Tissue Integrity - Adult  Goal: Skin integrity remains intact  Outcome: Progressing  Goal: Incisions, wounds, or drain sites healing without S/S of infection  Outcome: Progressing  Goal: Oral mucous membranes remain intact  Outcome: Progressing     Problem: Musculoskeletal - Adult  Goal: Return mobility to safest level of function  Outcome: Progressing  Goal: Return ADL status to a safe level of function  Outcome: Progressing     Problem: Gastrointestinal - Adult  Goal: Maintains adequate nutritional intake  Outcome: Progressing     Problem: Infection - Adult  Goal: Absence of infection at discharge  Outcome: Progressing  Goal: Absence of infection during hospitalization  Outcome: Progressing  Goal: Absence of fever/infection during anticipated neutropenic period  Outcome: Progressing     Problem: Metabolic/Fluid and Electrolytes - Adult  Goal: Electrolytes maintained within normal limits  Outcome: Progressing  Goal: Hemodynamic stability and optimal renal function maintained  Outcome: Progressing     Problem: Hematologic - Adult  Goal: Maintains hematologic stability  Outcome: Progressing

## 2023-01-04 NOTE — PROGRESS NOTES
Physical Therapy  Initial Evaluation Attempt and Discharge    23    Name: Javed Mills   : 1970    MRN: [id number]    PT order noted. Patient mobilizing in room while transport arrived to bring to stress test. Pt standing for ~8 minutes discussing the events that led up to his admission. No unsteadiness, no dizziness. Pt reports UAL in room. Works two jobs. No further therapy recommended at this time.     Electronically signed by Gwendolyn Powell PT on 2023 at 7:54 AM

## 2023-01-04 NOTE — PROGRESS NOTES
Pt in bed A&O x4. Pt denies pain currently. Pt refusing alarm, educated on fall precautions and safety. Call light in reach and fall precautions in place. Pt denies further needs.  Electronically signed by Mikaela Campo RN on 1/4/2023 at 1:23 PM

## 2023-01-04 NOTE — PROGRESS NOTES
Cardiology - PROGRESS NOTE    Admit Date: 1/2/2023     Reason for follow up:   Syncope        45 y.o. patient who presented to the hospital with complaints of Syncope  58-year-old male with no documented past medical history though he does tell me that he is struggled with hypertension in the past.  He presents after a syncopal event, reports atypical chest pain that is been going on for months. 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 At that point he again felt flushed lightheaded but then this time consciousness. Social History:  working 2 jobs, no smoking  Living Situation: with family       Interval History:   Patient seen and examined and notes reviewed   No acute distress   Sitting in bed  No chest pain   No shortness of breath   Reports he is feeling better today   No additional episodes of chest pain     All other systems reviewed and negative except as above. Diet: ADULT DIET; Regular;  No Caffeine  Pain is:None  Nausea:None    In: -   Out: 250    Patient Vitals for the past 96 hrs (Last 3 readings):   Weight   01/04/23 0520 150 lb (68 kg)   01/02/23 1135 151 lb 0.2 oz (68.5 kg)           Data:   Scheduled Meds:   Scheduled Meds:   lidocaine viscous hcl  15 mL Mouth/Throat TID    sodium chloride flush  10 mL IntraVENous 2 times per day    hydroCHLOROthiazide  25 mg Oral Daily    lisinopril  20 mg Oral Daily     Continuous Infusions:   sodium chloride       PRN Meds:.benzocaine, guaiFENesin-dextromethorphan, sodium chloride flush, sodium chloride, potassium chloride **OR** potassium alternative oral replacement **OR** potassium chloride, potassium chloride, magnesium sulfate, promethazine **OR** ondansetron, magnesium hydroxide, acetaminophen **OR** acetaminophen, perflutren lipid microspheres  Continuous Infusions:   sodium chloride         Intake/Output Summary (Last 24 hours) at 1/4/2023 1530  Last data filed at 1/4/2023 1134  Gross per 24 hour   Intake --   Output 250 ml   Net -250 ml       No results found for: ALT, AST, GGT, ALKPHOS, BILITOT  Lab Results   Component Value Date    CREATININE 0.7 (L) 01/04/2023    BUN 11 01/04/2023     01/04/2023    K 4.1 01/04/2023     01/04/2023    CO2 26 01/04/2023     No results found for: TSH, A0UVWRQ, L5MMQMU, THYROIDAB  Lab Results   Component Value Date    WBC 1.7 (LL) 01/04/2023    HGB 10.3 (L) 01/04/2023    HCT 33.4 (L) 01/04/2023    MCV 83.1 01/04/2023    PLT 47 (L) 01/04/2023     No components found for: CHLPL  Lab Results   Component Value Date    TRIG 124 01/02/2023     Lab Results   Component Value Date    HDL 38 (L) 01/02/2023     Lab Results   Component Value Date    LDLCALC 87 01/02/2023     Lab Results   Component Value Date    LABVLDL 25 01/02/2023         -----------------------------------------------------------------  Telemetry: personally reviewed   SR   Radiology:      FINDINGS:   INTRACRANIAL STRUCTURES/VENTRICLES:  There is no acute infarct. No mass   effect or midline shift. No evidence of an acute intracranial hemorrhage. The ventricles and sulci are normal in size and configuration. The   sellar/suprasellar regions appear unremarkable. The normal signal voids   within the major intracranial vessels appear maintained. No abnormal focus   of enhancement is seen within the brain. ORBITS: The visualized portion of the orbits demonstrate no acute abnormality. SINUSES: The visualized paranasal sinuses and mastoid air cells demonstrate   no acute abnormality.        BONES/SOFT TISSUES: The bone marrow signal intensity appears normal. The soft   tissues demonstrate no acute abnormality. Impression   No acute intracranial abnormality. No acute infarct. CXR:  FINDINGS:   The lungs and costophrenic angles are clear. The cardiomediastinal silhouette   and pulmonary vessels appear normal.           Impression   No radiographic evidence of an acute cardiopulmonary process. EKG: reviewed  Echo: pending   Stress Testin/4/23  Stress Protocols        Resting ECG    Normal sinus rhythm with right bundle branch block           Objective:   Vitals: BP (!) 149/86   Pulse 76   Temp 98.5 °F (36.9 °C) (Oral)   Resp 18   Ht 5' 9\" (1.753 m)   Wt 150 lb (68 kg)   SpO2 100%   BMI 22.15 kg/m²   General appearance: alert, appears stated age and cooperative, No acute distress   Skin: Skin color, texture, turgor normal. No rashes or ecchymosis. HEENT: Head: Normal, normocephalic, atraumatic. Neck: no carotid bruit, no JVD, supple, symmetrical, trachea midline, and thyroid not enlarged, symmetric, no tenderness/mass/nodules  Lungs: clear to auscultation bilaterally, no accessory muscle use, no respiratory distress, on RA  Heart: regular rate and rhythm, S1, S2 normal, no murmur, click, rub or gallop  Abdomen: soft, non-tender; bowel sounds normal; no masses,  no organomegaly  Extremities: extremities normal, atraumatic, no cyanosis or edema, pulses: DP +2/+2, PT +2/+2, femoral +2/+2  Neurologic: Mental status: Alert, oriented, thought content appropriate, no tremors, no gross sensory motor deficit,   Psychiatric: normal insight and affect      Assessment & Plan:    Patient Active Problem List:     Chest pain        Plan:  1. Syncope   Likely vasovagal   Compression stockings    Increase fluids   Echo pending   2. Chest pain    Atypical    NL stress testing   Continue risk factor modifications      If echo stable   Cardiology will sign off with outpatient follow up in 2-3 weeks.   Please call with questions.         Tri Chandler, APRN-CNP   [facility], [place], operated by [facility]  Cardiology   1/4/2023  3:30 PM

## 2023-01-04 NOTE — PROGRESS NOTES
Discharge instruction went over with pt, all questions answered. IV flushed and discontinued, no complications. New medications and side effects went over with pt, stated understanding. Escribed to pt pharmacy. Pt waiting on transportation.   Electronically signed by Ruth Medellin RN on 1/4/2023 at 6:52 PM

## 2023-01-04 NOTE — PROGRESS NOTES
[facility]  Oncology Hematology Care  Progress Note      SUBJECTIVE:   Pt being dc  I called his hematologist and discsused his case  I do feel he has been referred to rheum as outpt   He also may see dr Clary Guevara for mgus     OBJECTIVE      Medications    Current Facility-Administered Medications: benzocaine (ORAJEL) 10 % mucosal gel, , Mouth/Throat, BID PRN  lidocaine viscous hcl (XYLOCAINE) 2 % solution 15 mL, 15 mL, Mouth/Throat, TID  guaiFENesin-dextromethorphan (ROBITUSSIN DM) 100-10 MG/5ML syrup 5 mL, 5 mL, Oral, Q4H PRN  sodium chloride flush 0.9 % injection 10 mL, 10 mL, IntraVENous, 2 times per day  sodium chloride flush 0.9 % injection 10 mL, 10 mL, IntraVENous, PRN  0.9 % sodium chloride infusion, , IntraVENous, PRN  potassium chloride (KLOR-CON M) extended release tablet 40 mEq, 40 mEq, Oral, PRN **OR** potassium bicarb-citric acid (EFFER-K) effervescent tablet 40 mEq, 40 mEq, Oral, PRN **OR** potassium chloride 10 mEq/100 mL IVPB (Peripheral Line), 10 mEq, IntraVENous, PRN  potassium chloride 10 mEq/100 mL IVPB (Peripheral Line), 10 mEq, IntraVENous, PRN  magnesium sulfate 2000 mg in 50 mL IVPB premix, 2,000 mg, IntraVENous, PRN  promethazine (PHENERGAN) tablet 12.5 mg, 12.5 mg, Oral, Q6H PRN **OR** ondansetron (ZOFRAN) injection 4 mg, 4 mg, IntraVENous, Q6H PRN  magnesium hydroxide (MILK OF MAGNESIA) 400 MG/5ML suspension 30 mL, 30 mL, Oral, Daily PRN  acetaminophen (TYLENOL) tablet 650 mg, 650 mg, Oral, Q6H PRN **OR** acetaminophen (TYLENOL) suppository 650 mg, 650 mg, Rectal, Q6H PRN  hydroCHLOROthiazide (HYDRODIURIL) tablet 25 mg, 25 mg, Oral, Daily  lisinopril (PRINIVIL;ZESTRIL) tablet 20 mg, 20 mg, Oral, Daily  perflutren lipid microspheres (DEFINITY) injection 1.5 mL, 1.5 mL, IntraVENous, ONCE PRN  Physical    VITALS:  /80   Pulse 72   Temp 98.2 °F (36.8 °C) (Oral)   Resp 18   Ht 5' 9\" (1.753 m)   Wt 150 lb (68 kg)   SpO2 98%   BMI 22.15 kg/m²   TEMPERATURE:  Current - Temp: 98.2 °F (36.8 °C); Max - Temp  Av.8 °F (37.1 °C)  Min: 97.8 °F (36.6 °C)  Max: 100.5 °F (38.1 °C)  PULSE OXIMETRY RANGE: SpO2  Av.2 %  Min: 98 %  Max: 100 %  24HR INTAKE/OUTPUT:    Intake/Output Summary (Last 24 hours) at 2023 1750  Last data filed at 2023 1134  Gross per 24 hour   Intake --   Output 250 ml   Net -250 ml       CONSTITUTIONAL:  awake, alert, cooperative, no apparent distress, HEENT oral pharynx , no scleral icterus  HEMATOLOGIC/LYMPHATICS:  no cervical lymphadenopathy, no supraclavicular lymphadenopathy, no axillary lymphadenopathy and no inguinal lymphadenopathy  LUNGS:  No increased work of breathing, good air exchange, clear to auscultation bilaterally, no crackles or wheezing  CARDIOVASCULAR:  , regular rate and rhythm, normal S1 and S2, no S3 or S4, and no murmur noted  ABDOMEN:  No scars, normal bowel sounds, soft, non-distended, non-tender, no masses palpated, no hepatosplenomegally  MUSCULOSKELETAL:  There is no redness, warmth, or swelling of the joints. EXTREMETIES: No clubbing cynosis or edema  NEUROLOGIC:  Awake, alert, oriented to name, place and time. Cranial nerves II-XII are grossly intact. Motor is 5 out of 5 bilaterally. SKIN:  no bruising or bleeding      Data      Recent Labs     23  1212 23  0516 23  0601   WBC 2.7* 1.9* 1.7*   HGB 11.7* 10.1* 10.3*   HCT 37.0* 31.0* 33.4*   PLT 68* 50* 47*   MCV 82.3 81.1 83.1        Recent Labs     23  1212 23  0516 23  0601    138 138   K 4.2 3.8 4.1    104 106   CO2 27 25 26   BUN 17 17 11   CREATININE 1.1 1.0 0.7*     No results for input(s): AST, ALT, ALB, BILIDIR, BILITOT, ALKPHOS in the last 72 hours. Magnesium:  No results found for: MG    Imaging XR CHEST (2 VW)    Result Date: 2023  EXAMINATION: TWO XRAY VIEWS OF THE CHEST 2023 11:15 am COMPARISON: None.  HISTORY: ORDERING SYSTEM PROVIDED HISTORY: Syncope TECHNOLOGIST PROVIDED HISTORY: Reason for exam:->Syncope Reason for Exam: Syncope FINDINGS: The lungs and costophrenic angles are clear. The cardiomediastinal silhouette and pulmonary vessels appear normal.     No radiographic evidence of an acute cardiopulmonary process. CT HEAD WO CONTRAST    Result Date: 1/2/2023  EXAMINATION: CT OF THE HEAD WITHOUT CONTRAST  1/2/2023 1:55 pm TECHNIQUE: CT of the head was performed without the administration of intravenous contrast. Automated exposure control, iterative reconstruction, and/or weight based adjustment of the mA/kV was utilized to reduce the radiation dose to as low as reasonably achievable. COMPARISON: None. HISTORY: ORDERING SYSTEM PROVIDED HISTORY: syncope TECHNOLOGIST PROVIDED HISTORY: If patient is on cardiac monitor and/or pulse ox, they may be taken off cardiac monitor and pulse ox, left on O2 if currently on. All monitors reattached when patient returns to room. Has a \"code stroke\" or \"stroke alert\" been called? ->No Reason for exam:->syncope Decision Support Exception - unselect if not a suspected or confirmed emergency medical condition->Emergency Medical Condition (MA) Reason for Exam: syncope FINDINGS: BRAIN/VENTRICLES: There is no acute intracranial hemorrhage, mass effect or midline shift. No abnormal extra-axial fluid collection. The gray-white differentiation is maintained without evidence of an acute infarct. There is no evidence of hydrocephalus. ORBITS: The visualized portion of the orbits demonstrate no acute abnormality. SINUSES: The visualized paranasal sinuses and mastoid air cells demonstrate no acute abnormality. SOFT TISSUES/SKULL:  No acute abnormality of the visualized skull or soft tissues. No acute intracranial abnormality.      CT CERVICAL SPINE WO CONTRAST    Result Date: 1/2/2023  EXAMINATION: CT OF THE CERVICAL SPINE WITHOUT CONTRAST 1/2/2023 1:55 pm TECHNIQUE: CT of the cervical spine was performed without the administration of intravenous contrast. Multiplanar reformatted images are provided for review. Automated exposure control, iterative reconstruction, and/or weight based adjustment of the mA/kV was utilized to reduce the radiation dose to as low as reasonably achievable. COMPARISON: None. HISTORY: ORDERING SYSTEM PROVIDED HISTORY: syncope TECHNOLOGIST PROVIDED HISTORY: Reason for exam:->syncope Decision Support Exception - unselect if not a suspected or confirmed emergency medical condition->Emergency Medical Condition (MA) Reason for Exam: syncope FINDINGS: BONES/ALIGNMENT: Incomplete fusion posterior arch of C1 as a normal variant. No fracture or subluxation throughout the cervical spine. DEGENERATIVE CHANGES: Mild pattern of degenerative spondylosis at C3-4 through C5-6. No significant spinal canal stenosis. SOFT TISSUES: Paraspinal soft tissues are normal.  The visualized lungs are clear. No acute abnormality of the cervical spine. MRI BRAIN W WO CONTRAST    Result Date: 1/3/2023  EXAMINATION: MRI OF THE BRAIN WITHOUT AND WITH CONTRAST  1/3/2023 12:34 pm TECHNIQUE: Multiplanar multisequence MRI of the head/brain was performed without and with the administration of intravenous contrast. COMPARISON: None. HISTORY: ORDERING SYSTEM PROVIDED HISTORY: hx syphilis, syncope, persistent lightheadedness/dizziness, vertiginous symptoms TECHNOLOGIST PROVIDED HISTORY: Reason for exam:->hx syphilis, syncope, persistent lightheadedness/dizziness, vertiginous symptoms Reason for Exam: hx syphilis, syncope, persistent lightheadedness/dizziness, vertiginous symptoms Initial evaluation. FINDINGS: INTRACRANIAL STRUCTURES/VENTRICLES:  There is no acute infarct. No mass effect or midline shift. No evidence of an acute intracranial hemorrhage. The ventricles and sulci are normal in size and configuration. The sellar/suprasellar regions appear unremarkable. The normal signal voids within the major intracranial vessels appear maintained. No abnormal focus of enhancement is seen within the brain. ORBITS: The visualized portion of the orbits demonstrate no acute abnormality. SINUSES: The visualized paranasal sinuses and mastoid air cells demonstrate no acute abnormality. BONES/SOFT TISSUES: The bone marrow signal intensity appears normal. The soft tissues demonstrate no acute abnormality. No acute intracranial abnormality. No acute infarct. NM MYOCARDIAL SPECT REST EXERCISE OR RX    Result Date: 1/4/2023  Cardiac Perfusion Imaging  Demographics   Patient Name       Yazan Hart   Date of Study      01/04/2023         Gender              Male   Patient Number     [id number]         Date of Birth       1970   Visit Number       [id number]          Age                 45 year(s)   Accession Number   [id number]         Room Number         798   Corporate ID       [id number]          NM Technician       Aleks Iraheta, RT   Nurse              Kathi Lizama,   Interpreting        Scotty Johnson.                     RN                 Physician           Bambi Lundy MD   Ordering Physician Ava Barron MD  Physician           Saundra Montes MD   The procedure was explained in detail to the patient. Risks,  complications and alternative treatments were reviewed. Written consent  was obtained. Procedure Procedure Type:   Nuclear Stress Test:NM MYOCARDIAL SPECT REST EXERCISE OR RX   Study location: [facility] - Nuclear Medicine   Indications: Chest pain and Syncope. Hospital Status: Inpatient. Height: 69 inches Weight: 150 pounds  Risk Factors   The patient risk factors include:treated hypertension. Conclusions   Summary  Normal myocardial perfusion study. Normal LV size and systolic function. Non-diagnostic EKG response due to failure to reach target heart rate. Overall findings represent a low risk study.   Stress Protocols Resting ECG  Normal sinus rhythm with right bundle branch block   Resting HR:74 bpm       Resting BP:141/82 mmHg   Pre-stress physical exam: No chest discomfort  noted. Heart and lung sounds unremarkable. Adult  BP cuff used. O2 sat 99% on room air. Troponin T  neg x 3. To proceed with Lexiscan Myoview stress  test.  Stress Protocol:Pharmacologic - Lexiscan's  Peak HR:113 bpm                            HR/BP product:83519  Peak BP:173/86 mmHg  Predicted HR: 166 bpm  % of predicted HR: 68  Test duration: 1 min and 40 sec  Reason for termination:Completed   Arrhythmias  None   Symptoms  Lexiscan 0.4 mg IVP given. No chest discomfort noted. O2 sat on room  air 100% at peak injection. Patient had a brief episode of shortness  of breath that resolved in recovery. Complications  Procedure complication was none. Stress Interpretation  Non-diagnostic EKG response due to failure to reach target heart rate.   Procedure Medications   - Lexiscan I.V. 0.4 mg.10 sec  Imaging Protocols   - One Day   Rest                   Stress   Isotope:Myoview        Isotope: Myoview  Isotope dose:12.4 mCi  Isotope dose:32 mCi  Date:01/04/2023 00:00  Date:01/04/2023 00:00                          Technique:   Gated  Imaging Results   Applied corrections   - Attenuation correction  applied   - Scatter correction applied     Summed scores     - Summed stress score:    5     - Summed rest score: 5     Stress ejection    Ejection fraction:65 %    EDV :103 ml    ESV :36 ml    Stroke volume :67 ml  Medical History  Signatures   ------------------------------------------------------------------  Electronically signed by Ezequiel Anaya MD  (Interpreting physician) on 01/04/2023 at 11:49  ------------------------------------------------------------------        Problem List  Patient Active Problem List   Diagnosis    Chest pain    Syncope and collapse       ASSESSMENT AND PLAN      Hx of mgus=to see dr morrison-nothing to add inpt    Pancytopenia-possible autoimmune-?   Had some abnormal autoimmune labs and this may related to an autoimmune condition   Has some apthous ulcers?/   No inpt recs-I dondt see [facility] to repeat a marrow for now  Marielle Garcia MD, MD

## 2023-01-05 LAB
ACANTHOCYTES: ABNORMAL
ANISOCYTOSIS: ABNORMAL
BASOPHILS ABSOLUTE: 0 K/UL (ref 0–0.2)
BASOPHILS RELATIVE PERCENT: 0 %
BURR CELLS: ABNORMAL
EOSINOPHILS ABSOLUTE: 0.1 K/UL (ref 0–0.6)
EOSINOPHILS RELATIVE PERCENT: 3 %
HCT VFR BLD CALC: 33.4 % (ref 40.5–52.5)
HEMATOLOGY PATH CONSULT: NO
HEMOGLOBIN: 10.3 G/DL (ref 13.5–17.5)
LYMPHOCYTES ABSOLUTE: 0.9 K/UL (ref 1–5.1)
LYMPHOCYTES RELATIVE PERCENT: 50 %
MCH RBC QN AUTO: 25.5 PG (ref 26–34)
MCHC RBC AUTO-ENTMCNC: 30.7 G/DL (ref 31–36)
MCV RBC AUTO: 83.1 FL (ref 80–100)
MICROCYTES: ABNORMAL
MONOCYTES ABSOLUTE: 0.4 K/UL (ref 0–1.3)
MONOCYTES RELATIVE PERCENT: 24 %
NEUTROPHILS ABSOLUTE: 0.4 K/UL (ref 1.7–7.7)
NEUTROPHILS RELATIVE PERCENT: 23 %
OVALOCYTES: ABNORMAL
PDW BLD-RTO: 16 % (ref 12.4–15.4)
PLATELET # BLD: 47 K/UL (ref 135–450)
PLATELET SLIDE REVIEW: ABNORMAL
PMV BLD AUTO: 10.4 FL (ref 5–10.5)
POIKILOCYTES: ABNORMAL
RBC # BLD: 4.02 M/UL (ref 4.2–5.9)
SLIDE REVIEW: ABNORMAL
TEAR DROP CELLS: ABNORMAL
WBC # BLD: 1.7 K/UL (ref 4–11)

## 2024-01-10 ENCOUNTER — OFFICE VISIT (OUTPATIENT)
Dept: ORTHOPEDIC SURGERY | Age: 54
End: 2024-01-10
Payer: COMMERCIAL

## 2024-01-10 VITALS — BODY MASS INDEX: 23.85 KG/M2 | HEIGHT: 69 IN | WEIGHT: 161 LBS

## 2024-01-10 DIAGNOSIS — M51.37 DDD (DEGENERATIVE DISC DISEASE), LUMBOSACRAL: ICD-10-CM

## 2024-01-10 DIAGNOSIS — M48.061 FORAMINAL STENOSIS OF LUMBAR REGION: ICD-10-CM

## 2024-01-10 DIAGNOSIS — G89.29 CHRONIC MIDLINE LOW BACK PAIN, UNSPECIFIED WHETHER SCIATICA PRESENT: Primary | ICD-10-CM

## 2024-01-10 DIAGNOSIS — M54.50 CHRONIC MIDLINE LOW BACK PAIN, UNSPECIFIED WHETHER SCIATICA PRESENT: Primary | ICD-10-CM

## 2024-01-10 PROBLEM — M51.379 DDD (DEGENERATIVE DISC DISEASE), LUMBOSACRAL: Status: ACTIVE | Noted: 2024-01-10

## 2024-01-10 PROCEDURE — 99203 OFFICE O/P NEW LOW 30 MIN: CPT | Performed by: PHYSICIAN ASSISTANT

## 2024-01-10 RX ORDER — ACETAMINOPHEN 500 MG
500 TABLET ORAL 4 TIMES DAILY PRN
Qty: 120 TABLET | Refills: 0 | Status: SHIPPED | OUTPATIENT
Start: 2024-01-10

## 2024-01-10 RX ORDER — CYCLOBENZAPRINE HCL 10 MG
10 TABLET ORAL 3 TIMES DAILY PRN
Qty: 90 TABLET | Refills: 0 | Status: SHIPPED | OUTPATIENT
Start: 2024-01-10 | End: 2024-02-09
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
## 2024-01-23 ENCOUNTER — HOSPITAL ENCOUNTER (OUTPATIENT)
Dept: PHYSICAL THERAPY | Age: 54
Setting detail: THERAPIES SERIES
Discharge: HOME | End: 2024-01-23
Payer: COMMERCIAL

## 2024-01-23 DIAGNOSIS — R68.89 DECREASED ACTIVITY TOLERANCE: ICD-10-CM

## 2024-01-23 DIAGNOSIS — Z78.9 NEED FOR HOME EXERCISE PROGRAM: ICD-10-CM

## 2024-01-23 DIAGNOSIS — R53.1 FUNCTIONAL WEAKNESS: ICD-10-CM

## 2024-01-23 DIAGNOSIS — R52 PAIN: Primary | ICD-10-CM

## 2024-01-23 DIAGNOSIS — R68.89 DECREASED ABILITY TO PERFORM ACTIVITIES: ICD-10-CM

## 2024-01-23 DIAGNOSIS — R68.89 DECREASED EXERCISE TOLERANCE: ICD-10-CM

## 2024-01-23 DIAGNOSIS — M25.60 LIMITED JOINT RANGE OF MOTION (ROM): ICD-10-CM

## 2024-01-23 DIAGNOSIS — Z56.89 DIFFICULTY PERFORMING WORK ACTIVITIES: ICD-10-CM

## 2024-01-23 PROCEDURE — 97110 THERAPEUTIC EXERCISES: CPT

## 2024-01-23 PROCEDURE — 97162 PT EVAL MOD COMPLEX 30 MIN: CPT

## 2024-01-23 PROCEDURE — 97112 NEUROMUSCULAR REEDUCATION: CPT
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
## 2024-01-23 NOTE — FLOWSHEET NOTE
[facility]- Outpatient Rehabilitation and Therapy [address], [address], [place] [address] office: [phone] fax: [phone]      Physical Therapy: TREATMENT/PROGRESS NOTE   Patient: Dusty Jauregui (53 y.o. male)   Treatment Date: 2024   :  1970 MRN: [id number]   Visit #: 1   Insurance Allowable Auth Needed   BMN - auth [x]Yes    []No    Insurance: Payor: DEVIN / Plan: DEVIN FITZPATRICK ERICA / Product Type: *No Product type* /   Insurance ID: [id number] - (Commercial)  Secondary Insurance (if applicable):    Treatment Diagnosis:     ICD-10-CM    1. Pain  R52       2. Decreased activity tolerance  R68.89       3. Difficulty performing work activities  Z56.89       4. Decreased exercise tolerance  R68.89       5. Limited joint range of motion (ROM)  M25.60       6. Functional weakness  R53.1       7. Decreased ability to perform activities  R68.89       8. Need for home exercise program  Z78.9          Medical Diagnosis:    DDD (degenerative disc disease), lumbosacral [M51.37]   Referring Physician: Pedro Pablo Merida PA  PCP: Linda Carpenter APRN - CNP                             Plan of care signed: NO    Date of Patient follow up with Physician:      Progress Report/POC: EVAL today  POC update due: (10 visits /OR AUTH LIMITS, whichever is less)  2024      (Cut and paste Precautions/Contraindications from Eval)    Preferred Language for Healthcare:   [x]English       []other:    SUBJECTIVE EXAMINATION     Patient Report/Comments: see eval     Test used Initial score  2024   Pain Summary VAS     Functional questionnaire Modified Oswestry     Other:                OBJECTIVE EXAMINATION     Observation:     Test measurements: see eval    Exercises/Interventions:     Therapeutic Ex (79989)  Resistance Sets/ Reps/ Time Completed Notes/Cues/Progressions   cardio Treadmill?  >    AAROM/ AROM                     Squat/ deadlift   >    Side plank   >

## 2024-01-26 ENCOUNTER — HOSPITAL ENCOUNTER (OUTPATIENT)
Dept: PHYSICAL THERAPY | Age: 54
Setting detail: THERAPIES SERIES
Discharge: HOME | End: 2024-01-26
Payer: COMMERCIAL

## 2024-01-26 PROCEDURE — 97110 THERAPEUTIC EXERCISES: CPT

## 2024-01-26 PROCEDURE — 97112 NEUROMUSCULAR REEDUCATION: CPT

## 2024-01-26 NOTE — FLOWSHEET NOTE
[facility]- Outpatient Rehabilitation and Therapy [address], [address], [place] [address] office: [phone] fax: [phone]      Physical Therapy: TREATMENT/PROGRESS NOTE   Patient: Dusty Jauregui (53 y.o. male)   Treatment Date: 2024   :  1970 MRN: [id number]   Visit #: 2   Insurance Allowable Auth Needed   BMN - 10 visits till  3/23/24   [x]Yes    []No    Insurance: Payor: DEVIN / Plan: DEVIN FITZPATRICK ERICA / Product Type: *No Product type* /   Insurance ID: [id number] - (Commercial)  Secondary Insurance (if applicable):    Treatment Diagnosis:   No diagnosis found.     Medical Diagnosis:    DDD (degenerative disc disease), lumbosacral [M51.37]   Referring Physician: Pedro Pablo Merida PA  PCP: Linda Carpenter APRN - CNP                             Plan of care signed: NO    Date of Patient follow up with Physician:      Progress Report/POC: NO  POC update due: (10 visits /OR AUTH LIMITS, whichever is less)  2024      (Cut and paste Precautions/Contraindications from Eval)    Preferred Language for Healthcare:   [x]English       []other:    Hx:     Imaging: CT scan Dec 2023:    IMPRESSION:   1. L1-L2: Foraminal protrusions. No significant stenosis.   2.  L3-L4: Mild bilateral neural foraminal stenosis.   3.  L4-L5: Right-sided eccentric bulge, superimposed central cranial extrusion. Moderate facet arthropathy. Mild to moderate central spinal canal stenosis. Moderate left, severe right neural foraminal stenosis.   4.  L5-S1: Moderate to severe facet arthropathy. Moderate bilateral neural foraminal stenosis.   5.  Congenital nonunion right L1 transverse process versus chronic mildly displaced fracture.       SUBJECTIVE EXAMINATION     Patient Report/Comments: pt states first day was fine.   *NV needs to change schedule d/t 10 visits per insurance limit*     Test used Initial score  2024   Pain Summary VAS     Functional questionnaire Modified

## 2024-01-30 ENCOUNTER — APPOINTMENT (OUTPATIENT)
Dept: PHYSICAL THERAPY | Age: 54
End: 2024-01-30
Payer: COMMERCIAL

## 2024-01-31 ENCOUNTER — APPOINTMENT (OUTPATIENT)
Dept: PHYSICAL THERAPY | Age: 54
End: 2024-01-31
Payer: COMMERCIAL

## 2024-02-02 ENCOUNTER — APPOINTMENT (OUTPATIENT)
Dept: PHYSICAL THERAPY | Age: 54
End: 2024-02-02
Payer: COMMERCIAL

## 2024-02-06 ENCOUNTER — HOSPITAL ENCOUNTER (OUTPATIENT)
Dept: PHYSICAL THERAPY | Age: 54
Setting detail: THERAPIES SERIES
Discharge: HOME | End: 2024-02-06
Payer: COMMERCIAL

## 2024-02-06 PROCEDURE — 97140 MANUAL THERAPY 1/> REGIONS: CPT

## 2024-02-06 PROCEDURE — 97110 THERAPEUTIC EXERCISES: CPT

## 2024-02-06 PROCEDURE — 97112 NEUROMUSCULAR REEDUCATION: CPT

## 2024-02-06 NOTE — FLOWSHEET NOTE
[facility]- Outpatient Rehabilitation and Therapy [address], [place] [address] office: [phone] fax: [phone]      Physical Therapy: TREATMENT/PROGRESS NOTE   Patient: Dusty Jauregui (53 y.o. male)   Treatment Date: 2024   :  1970 MRN: [id number]   Visit #: 3   Insurance Allowable Auth Needed   BMN - 10 visits till  3/23/24   [x]Yes    []No    Insurance: Payor: DEVIN / Plan: DEVIN FITZPATRICK ERICA / Product Type: *No Product type* /   Insurance ID: [id number] - (Commercial)  Secondary Insurance (if applicable):    Treatment Diagnosis:   No diagnosis found.     Medical Diagnosis:    DDD (degenerative disc disease), lumbosacral [M51.37]   Referring Physician: Pedro Pablo Merida PA  PCP: Linda Carpenter APRN - CNP                             Plan of care signed: NO    Date of Patient follow up with Physician:      Progress Report/POC: NO  POC update due: (10 visits /OR AUTH LIMITS, whichever is less)  2024      (Cut and paste Precautions/Contraindications from Eval)    Preferred Language for Healthcare:   [x]English       []other:    Hx:     Imaging: CT scan Dec 2023:    IMPRESSION:   1. L1-L2: Foraminal protrusions. No significant stenosis.   2.  L3-L4: Mild bilateral neural foraminal stenosis.   3.  L4-L5: Right-sided eccentric bulge, superimposed central cranial extrusion. Moderate facet arthropathy. Mild to moderate central spinal canal stenosis. Moderate left, severe right neural foraminal stenosis.   4.  L5-S1: Moderate to severe facet arthropathy. Moderate bilateral neural foraminal stenosis.   5.  Congenital nonunion right L1 transverse process versus chronic mildly displaced fracture.       SUBJECTIVE EXAMINATION     Patient Report/Comments: pt states he has returned to work, and he is feeling some pain, but doesn't think its as bad. He is glad he had a chance to let his back rest and heal and he thinks the PT ex's are helping to strengthen his

## 2024-02-09 ENCOUNTER — HOSPITAL ENCOUNTER (OUTPATIENT)
Dept: PHYSICAL THERAPY | Age: 54
Setting detail: THERAPIES SERIES
Discharge: HOME | End: 2024-02-09
Payer: COMMERCIAL

## 2024-02-09 PROCEDURE — 97110 THERAPEUTIC EXERCISES: CPT

## 2024-02-09 NOTE — FLOWSHEET NOTE
[facility]- Outpatient Rehabilitation and Therapy [address], [place] [address] office: [phone] fax: [phone]      Physical Therapy: TREATMENT/PROGRESS NOTE   Patient: Dusty Jauregui (53 y.o. male)   Treatment Date: 2024   :  1970 MRN: [id number]   Visit #: 4   Insurance Allowable Auth Needed   BMN - 10 visits till  3/23/24   [x]Yes    []No    Insurance: Payor: DEVIN / Plan: DEVIN FITZPATRICK ERICA / Product Type: *No Product type* /   Insurance ID: [id number] - (Commercial)  Secondary Insurance (if applicable):    Treatment Diagnosis:   No diagnosis found.     Medical Diagnosis:    DDD (degenerative disc disease), lumbosacral [M51.37]   Referring Physician: Pedro Pablo Merida PA  PCP: Linda Carpenter APRN - CNP                             Plan of care signed: NO    Date of Patient follow up with Physician:      Progress Report/POC: NO  POC update due: (10 visits /OR AUTH LIMITS, whichever is less)  2024      (Cut and paste Precautions/Contraindications from Eval)    Preferred Language for Healthcare:   [x]English       []other:    Hx:     Imaging: CT scan Dec 2023:    IMPRESSION:   1. L1-L2: Foraminal protrusions. No significant stenosis.   2.  L3-L4: Mild bilateral neural foraminal stenosis.   3.  L4-L5: Right-sided eccentric bulge, superimposed central cranial extrusion. Moderate facet arthropathy. Mild to moderate central spinal canal stenosis. Moderate left, severe right neural foraminal stenosis.   4.  L5-S1: Moderate to severe facet arthropathy. Moderate bilateral neural foraminal stenosis.   5.  Congenital nonunion right L1 transverse process versus chronic mildly displaced fracture.       SUBJECTIVE EXAMINATION     Patient Report/Comments: pt states he worked 8 hours and just felt slight tightness, epsom salt bath helped. Feels very good about his progress thus far. He notices his back still gets fatigued and tight at times, but the ex's are

## 2024-02-13 ENCOUNTER — APPOINTMENT (OUTPATIENT)
Dept: PHYSICAL THERAPY | Age: 54
End: 2024-02-13
Payer: COMMERCIAL

## 2024-02-16 ENCOUNTER — HOSPITAL ENCOUNTER (OUTPATIENT)
Dept: PHYSICAL THERAPY | Age: 54
Setting detail: THERAPIES SERIES
End: 2024-02-16
Payer: COMMERCIAL

## 2024-02-20 ENCOUNTER — HOSPITAL ENCOUNTER (OUTPATIENT)
Dept: PHYSICAL THERAPY | Age: 54
Setting detail: THERAPIES SERIES
Discharge: HOME | End: 2024-02-20
Payer: COMMERCIAL

## 2024-02-20 PROCEDURE — 97110 THERAPEUTIC EXERCISES: CPT

## 2024-02-20 NOTE — FLOWSHEET NOTE
[facility]- Outpatient Rehabilitation and Therapy [address], [place] [address] office: [phone] fax: [phone]      Physical Therapy: TREATMENT/PROGRESS NOTE   Patient: Dusty Jauregui (53 y.o. male)   Treatment Date: 2024   :  1970 MRN: [id number]   Visit #: 5   Insurance Allowable Auth Needed   BMN - 10 visits till  3/23/24   [x]Yes    []No    Insurance: Payor: DEVIN / Plan: DEVIN FITZPATRICK ERICA / Product Type: *No Product type* /   Insurance ID: [id number] - (Commercial)  Secondary Insurance (if applicable):    Treatment Diagnosis:   No diagnosis found.     Medical Diagnosis:    DDD (degenerative disc disease), lumbosacral [M51.37]   Referring Physician: Pedro Pablo Merida PA  PCP: Linda Carpenter APRN - CNP                             Plan of care signed: NO    Date of Patient follow up with Physician:      Progress Report/POC: NO  POC update due: (10 visits /OR AUTH LIMITS, whichever is less)  2024      (Cut and paste Precautions/Contraindications from Eval)    Preferred Language for Healthcare:   [x]English       []other:    Imaging: CT scan Dec 2023:    IMPRESSION:   1. L1-L2: Foraminal protrusions. No significant stenosis.   2.  L3-L4: Mild bilateral neural foraminal stenosis.   3.  L4-L5: Right-sided eccentric bulge, superimposed central cranial extrusion. Moderate facet arthropathy. Mild to moderate central spinal canal stenosis. Moderate left, severe right neural foraminal stenosis.   4.  L5-S1: Moderate to severe facet arthropathy. Moderate bilateral neural foraminal stenosis.   5.  Congenital nonunion right L1 transverse process versus chronic mildly displaced fracture.       SUBJECTIVE EXAMINATION     Patient Report/Comments: pt states he worked 8 hours and just felt slight tightness, epsom salt bath helped. Feels very good about his progress thus far. He notices his back still gets fatigued and tight at times, but the ex's are helping

## 2024-02-20 NOTE — PLAN OF CARE
[facility] - Outpatient Rehabilitation & Therapy  [address] [place] [address]  Phone: [phone]   Fax:     [phone]    Physical Therapy Prescription    Date: 2024    Patient Name: Dusty Jauregui  : 1970  MRN: [id number]    Diagnosis:DDD (degenerative disc disease), lumbosacral [M51.37]       Referring Physician: Pedro Pablo Merida PA    Drug Allergies:    With your approval we would like to add the following to the patient's current PT treatment:    [x] Dry Needling    [] TENS Unit        [] [facility] Cervical Traction Unit        [] [facility] Lumbar Traction Unit    [] Quad/Standard Cane        [] Rolling/Standard Walker         [] Iontophoresis: Dexamethasone 4mg/ml injectable-30 ml vial     Quantity: 1 vial for iontophoresis     As needed        Electronically signed by:  Baljinder Mata PT     If you have any questions or concerns, please don't hesitate to call.  Thank you for your referral.    Physician Signature:________________________________Date:__________________  By signing above, therapist’s plan is approved by physician

## 2024-02-23 ENCOUNTER — TELEPHONE (OUTPATIENT)
Dept: ORTHOPEDIC SURGERY | Age: 54
End: 2024-02-23

## 2024-02-23 ENCOUNTER — APPOINTMENT (OUTPATIENT)
Dept: PHYSICAL THERAPY | Age: 54
End: 2024-02-23
Payer: COMMERCIAL

## 2024-02-23 NOTE — TELEPHONE ENCOUNTER
Prescription Refill     Medication Name:  CYCLOBENZAPRINE 10MG, ACETAMINOPHEN 500MG  Pharmacy: [facility] IN [place] ON [address]  Patient Contact Number:  [phone]    PT CALLED REQUESTING REFILLS OF CYCLOBENZAPRINE 10MG AND ACETAMINOPHEN 500MG. PT USES [facility] IN [place] ON [address].    PLEASE CALL PT BACK AT THE ABOVE NUMBER.

## 2024-02-23 NOTE — TELEPHONE ENCOUNTER
Patient notified that Pedro Pablo is out of the office until Tuesday.  He was told we will send it to him then.  Please call patient and let him know when prescription has been sent.

## 2024-02-27 ENCOUNTER — APPOINTMENT (OUTPATIENT)
Dept: PHYSICAL THERAPY | Age: 54
End: 2024-02-27
Payer: COMMERCIAL

## 2024-02-28 ENCOUNTER — TELEPHONE (OUTPATIENT)
Dept: ORTHOPEDIC SURGERY | Age: 54
End: 2024-02-28

## 2024-02-28 RX ORDER — ACETAMINOPHEN 500 MG
500 TABLET ORAL 4 TIMES DAILY PRN
Qty: 120 TABLET | Refills: 0 | Status: SHIPPED | OUTPATIENT
Start: 2024-02-28

## 2024-02-28 RX ORDER — CYCLOBENZAPRINE HCL 10 MG
10 TABLET ORAL 3 TIMES DAILY PRN
Qty: 90 TABLET | Refills: 0 | Status: SHIPPED | OUTPATIENT
Start: 2024-02-28 | End: 2024-03-29

## 2024-02-28 NOTE — TELEPHONE ENCOUNTER
Prescription Refill     Medication Name:  ACETOMINOPHEN AND MUSCLE RELAXER   Pharmacy: [facility]  Patient Contact Number:  [phone]       [facility].  THE PT SAID HE'S ALMOST HALF DONE WITH HIS PHYSICAL THERAPY   The patient is a 60y Male complaining of foreign body throat.

## 2024-03-01 ENCOUNTER — APPOINTMENT (OUTPATIENT)
Dept: PHYSICAL THERAPY | Age: 54
End: 2024-03-01
Payer: COMMERCIAL

## 2024-03-05 ENCOUNTER — HOSPITAL ENCOUNTER (OUTPATIENT)
Dept: PHYSICAL THERAPY | Age: 54
Setting detail: THERAPIES SERIES
Discharge: HOME | End: 2024-03-05
Payer: COMMERCIAL

## 2024-03-05 PROCEDURE — 97110 THERAPEUTIC EXERCISES: CPT

## 2024-03-05 NOTE — FLOWSHEET NOTE
[facility]- Outpatient Rehabilitation and Therapy [address], [place] [address] office: [phone] fax: [phone]      Physical Therapy: TREATMENT/PROGRESS NOTE   Patient: Dusty Jauregui (53 y.o. male)   Treatment Date: 2024   :  1970 MRN: [id number]   Visit #: 6   Insurance Allowable Auth Needed   BMN - 10 visits till  3/23/24   [x]Yes    []No    Insurance: Payor: DEVIN / Plan: DEVIN FITZPATRICK ERICA / Product Type: *No Product type* /   Insurance ID: [id number] - (Commercial)  Secondary Insurance (if applicable):    Treatment Diagnosis:   No diagnosis found.     Medical Diagnosis:    DDD (degenerative disc disease), lumbosacral [M51.37]   Referring Physician: Pedro Pablo Merida PA  PCP: Linda Carpenter APRN - CNP                             Plan of care signed: NO    Date of Patient follow up with Physician:      Progress Report/POC: NO  POC update due: (10 visits /OR AUTH LIMITS, whichever is less)  2024      (Cut and paste Precautions/Contraindications from Eval)    Preferred Language for Healthcare:   [x]English       []other:    Imaging: CT scan Dec 2023:    IMPRESSION:   1. L1-L2: Foraminal protrusions. No significant stenosis.   2.  L3-L4: Mild bilateral neural foraminal stenosis.   3.  L4-L5: Right-sided eccentric bulge, superimposed central cranial extrusion. Moderate facet arthropathy. Mild to moderate central spinal canal stenosis. Moderate left, severe right neural foraminal stenosis.   4.  L5-S1: Moderate to severe facet arthropathy. Moderate bilateral neural foraminal stenosis.   5.  Congenital nonunion right L1 transverse process versus chronic mildly displaced fracture.       SUBJECTIVE EXAMINATION     Patient Report/Comments: pt states he worked 8 hours and just felt slight tightness, epsom salt bath helped. Feels very good about his progress thus far. He notices his back still gets fatigued and tight at times, but the ex's are helping

## 2024-03-08 ENCOUNTER — APPOINTMENT (OUTPATIENT)
Dept: PHYSICAL THERAPY | Age: 54
End: 2024-03-08
Payer: COMMERCIAL

## 2024-03-21 ENCOUNTER — APPOINTMENT (OUTPATIENT)
Dept: PHYSICAL THERAPY | Age: 54
End: 2024-03-21
Payer: COMMERCIAL

## 2024-03-29 ENCOUNTER — APPOINTMENT (OUTPATIENT)
Dept: PHYSICAL THERAPY | Age: 54
End: 2024-03-29
Payer: COMMERCIAL

## 2024-04-05 ENCOUNTER — APPOINTMENT (OUTPATIENT)
Dept: PHYSICAL THERAPY | Age: 54
End: 2024-04-05
Payer: COMMERCIAL

## 2024-04-12 ENCOUNTER — APPOINTMENT (OUTPATIENT)
Dept: PHYSICAL THERAPY | Age: 54
End: 2024-04-12
Payer: COMMERCIAL

## 2024-04-17 ENCOUNTER — OFFICE VISIT (OUTPATIENT)
Dept: ORTHOPEDIC SURGERY | Age: 54
End: 2024-04-17
Payer: COMMERCIAL

## 2024-04-17 VITALS — HEIGHT: 69 IN | WEIGHT: 153 LBS | BODY MASS INDEX: 22.66 KG/M2

## 2024-04-17 DIAGNOSIS — M48.061 FORAMINAL STENOSIS OF LUMBAR REGION: ICD-10-CM

## 2024-04-17 DIAGNOSIS — G89.29 CHRONIC MIDLINE LOW BACK PAIN, UNSPECIFIED WHETHER SCIATICA PRESENT: ICD-10-CM

## 2024-04-17 DIAGNOSIS — M54.50 CHRONIC MIDLINE LOW BACK PAIN, UNSPECIFIED WHETHER SCIATICA PRESENT: ICD-10-CM

## 2024-04-17 DIAGNOSIS — M51.37 DDD (DEGENERATIVE DISC DISEASE), LUMBOSACRAL: Primary | ICD-10-CM

## 2024-04-17 PROCEDURE — 99213 OFFICE O/P EST LOW 20 MIN: CPT | Performed by: PHYSICIAN ASSISTANT

## 2024-04-17 RX ORDER — CYCLOBENZAPRINE HCL 10 MG
10 TABLET ORAL 3 TIMES DAILY PRN
Qty: 90 TABLET | Refills: 0 | Status: SHIPPED | OUTPATIENT
Start: 2024-04-17 | End: 2024-05-17

## 2024-04-18 NOTE — PROGRESS NOTES
Physician Assistant   [facility]                                         Disclaimer:  This note was generated with use of a verbal recognition program (DRAGON) and an attempt was made to check for errors.  It is possible that there are still dictated errors within this office note.  If so, please bring any significant errors to my attention for an addendum.  All efforts were made to ensure that this office note is accurate.

## 2024-04-19 ENCOUNTER — APPOINTMENT (OUTPATIENT)
Dept: PHYSICAL THERAPY | Age: 54
End: 2024-04-19
Payer: COMMERCIAL

## 2024-04-26 ENCOUNTER — HOSPITAL ENCOUNTER (OUTPATIENT)
Dept: PHYSICAL THERAPY | Age: 54
Setting detail: THERAPIES SERIES
Discharge: HOME | End: 2024-04-26
Payer: COMMERCIAL

## 2024-04-26 PROCEDURE — 97112 NEUROMUSCULAR REEDUCATION: CPT

## 2024-04-26 PROCEDURE — 97110 THERAPEUTIC EXERCISES: CPT

## 2024-04-26 NOTE — FLOWSHEET NOTE
[facility]- Outpatient Rehabilitation and Therapy [address], [place] [address] office: [phone] fax: [phone]      Physical Therapy: TREATMENT/PROGRESS NOTE   Patient: Dusty Jauregui (54 y.o. male)   Treatment Date: 2024   :  1970 MRN: [id number]   Visit #: 7   Insurance Allowable Auth Needed   BMN - 10 visits till  3/23/24    2 additional (total of 12) visits till    [x]Yes    []No    Insurance: Payor: DEVIN / Plan: DEVIN FITZPATRICK ERICA / Product Type: *No Product type* /   Insurance ID: [id number] - (Commercial)  Secondary Insurance (if applicable):    Treatment Diagnosis:   No diagnosis found.     Medical Diagnosis:    DDD (degenerative disc disease), lumbosacral [M51.37]   Referring Physician: Pedro Pablo Merida PA  PCP: Linda Carpenter APRN - CNP                             Plan of care signed: NO    Date of Patient follow up with Physician:      Progress Report/POC: NO  POC update due: (10 visits /OR AUTH LIMITS, whichever is less)  2024,       Preferred Language for Healthcare:   [x]English       []other:    Imaging: CT scan Dec 2023:    IMPRESSION:   1. L1-L2: Foraminal protrusions. No significant stenosis.   2.  L3-L4: Mild bilateral neural foraminal stenosis.   3.  L4-L5: Right-sided eccentric bulge, superimposed central cranial extrusion. Moderate facet arthropathy. Mild to moderate central spinal canal stenosis. Moderate left, severe right neural foraminal stenosis.   4.  L5-S1: Moderate to severe facet arthropathy. Moderate bilateral neural foraminal stenosis.   5.  Congenital nonunion right L1 transverse process versus chronic mildly displaced fracture.       SUBJECTIVE EXAMINATION     Patient Report/Comments: pt states he worked 8 hours and just felt slight tightness, epsom salt bath helped. Feels very good about his progress thus far. He notices his back still gets fatigued and tight at times, but the ex's are helping to make his

## 2024-04-30 ENCOUNTER — HOSPITAL ENCOUNTER (OUTPATIENT)
Dept: MRI IMAGING | Age: 54
Discharge: HOME | End: 2024-04-30
Payer: COMMERCIAL

## 2024-04-30 DIAGNOSIS — M48.061 FORAMINAL STENOSIS OF LUMBAR REGION: ICD-10-CM

## 2024-04-30 DIAGNOSIS — M51.37 DDD (DEGENERATIVE DISC DISEASE), LUMBOSACRAL: ICD-10-CM

## 2024-04-30 PROCEDURE — 72148 MRI LUMBAR SPINE W/O DYE: CPT

## 2024-05-15 ENCOUNTER — OFFICE VISIT (OUTPATIENT)
Dept: ORTHOPEDIC SURGERY | Age: 54
End: 2024-05-15
Payer: COMMERCIAL

## 2024-05-15 ENCOUNTER — TELEPHONE (OUTPATIENT)
Dept: ORTHOPEDIC SURGERY | Age: 54
End: 2024-05-15

## 2024-05-15 VITALS — HEIGHT: 69 IN | BODY MASS INDEX: 22.59 KG/M2

## 2024-05-15 DIAGNOSIS — M51.37 DDD (DEGENERATIVE DISC DISEASE), LUMBOSACRAL: Primary | ICD-10-CM

## 2024-05-15 DIAGNOSIS — M48.061 FORAMINAL STENOSIS OF LUMBAR REGION: ICD-10-CM

## 2024-05-15 PROCEDURE — 99213 OFFICE O/P EST LOW 20 MIN: CPT | Performed by: PHYSICIAN ASSISTANT

## 2024-05-15 RX ORDER — MELOXICAM 15 MG/1
15 TABLET ORAL DAILY
Qty: 30 TABLET | Refills: 2 | Status: SHIPPED | OUTPATIENT
Start: 2024-05-15 | End: 2024-08-13

## 2024-05-15 RX ORDER — ACETAMINOPHEN 500 MG
500 TABLET ORAL 4 TIMES DAILY PRN
Qty: 120 TABLET | Refills: 2 | Status: SHIPPED | OUTPATIENT
Start: 2024-05-15 | End: 2024-08-13

## 2024-05-15 RX ORDER — BACLOFEN 10 MG/1
10 TABLET ORAL 3 TIMES DAILY
Qty: 90 TABLET | Refills: 2 | Status: SHIPPED | OUTPATIENT
Start: 2024-05-15 | End: 2024-08-13

## 2024-05-15 RX ORDER — TIZANIDINE 4 MG/1
4 TABLET ORAL 4 TIMES DAILY PRN
Qty: 120 TABLET | Refills: 2 | Status: SHIPPED | OUTPATIENT
Start: 2024-05-15 | End: 2024-05-15 | Stop reason: ALTCHOICE

## 2024-05-15 NOTE — TELEPHONE ENCOUNTER
RIVERA AN WITH [facility] CALLED TO INFORM CLINICAL THAT THERE IS A MIX OF RX BETWEEN SIPRO AND TIZANIDINE. SHE STATED THAT THERE IS A DRUG INTERACTION BETWEEN THE TWO.    DO NOT RECOMMEND    PLEASE ADVISE

## 2024-05-15 NOTE — PROGRESS NOTES
Subjective:      Patient ID: Dusty Jauregui is a 54 y.o. male who is here for follow up evaluation of low back pain and right leg pain.  Recently underwent lumbar spine MRI.  He is here today to review this test results.  He denies any new symptoms or complaints.  He states he has been doing some painting for a family member earlier today which has caused increased low back pain.      Review Of Systems:   Significant for back pain and negative for recent weight loss, fatigue, chills, visual disturbances, blood in stool or urine, recent infection.        History reviewed. No pertinent past medical history.    History reviewed. No pertinent family history.    History reviewed. No pertinent surgical history.    Social History     Occupational History    Not on file   Tobacco Use    Smoking status: Never    Smokeless tobacco: Never   Vaping Use    Vaping Use: Never used   Substance and Sexual Activity    Alcohol use: Yes    Drug use: Not Currently    Sexual activity: Not on file       Current Outpatient Medications   Medication Sig Dispense Refill    acetaminophen (TYLENOL) 500 MG tablet Take 1 tablet by mouth 4 times daily as needed for Pain 120 tablet 2    tiZANidine (ZANAFLEX) 4 MG tablet Take 1 tablet by mouth 4 times daily as needed (spasm) 120 tablet 2    meloxicam (MOBIC) 15 MG tablet Take 1 tablet by mouth daily 30 tablet 2    lidocaine viscous hcl (XYLOCAINE) 2 % SOLN solution Take 15 mLs by mouth in the morning, at noon, and at bedtime 360 mL 0    sildenafil (VIAGRA) 50 MG tablet Take 1 tablet by mouth as needed for Erectile Dysfunction      finasteride (PROPECIA) 1 MG tablet Take 1 tablet by mouth daily      Multiple Vitamins-Minerals (THERAPEUTIC MULTIVITAMIN-MINERALS) tablet Take 1 tablet by mouth daily      lisinopril (PRINIVIL;ZESTRIL) 20 MG tablet Take 1 tablet by mouth daily      hydroCHLOROthiazide (HYDRODIURIL) 25 MG tablet Take 1 tablet by mouth daily      ibuprofen (ADVIL;MOTRIN) 200 MG tablet Take

## 2024-05-27 ENCOUNTER — HOSPITAL ENCOUNTER (EMERGENCY)
Age: 54
Discharge: HOME | End: 2024-05-27
Payer: COMMERCIAL

## 2024-05-27 VITALS
WEIGHT: 161.82 LBS | BODY MASS INDEX: 23.97 KG/M2 | TEMPERATURE: 98.1 F | HEART RATE: 97 BPM | OXYGEN SATURATION: 100 % | RESPIRATION RATE: 19 BRPM | SYSTOLIC BLOOD PRESSURE: 125 MMHG | HEIGHT: 69 IN | DIASTOLIC BLOOD PRESSURE: 85 MMHG

## 2024-05-27 DIAGNOSIS — M79.605 LOWER EXTREMITY PAIN, LEFT: Primary | ICD-10-CM

## 2024-05-27 LAB — D-DIMER QUANTITATIVE: 0.69 UG/ML FEU (ref 0–0.6)

## 2024-05-27 PROCEDURE — 85379 FIBRIN DEGRADATION QUANT: CPT

## 2024-05-27 PROCEDURE — 99283 EMERGENCY DEPT VISIT LOW MDM: CPT

## 2024-05-27 ASSESSMENT — PAIN - FUNCTIONAL ASSESSMENT: PAIN_FUNCTIONAL_ASSESSMENT: 0-10

## 2024-05-27 ASSESSMENT — ENCOUNTER SYMPTOMS
VOMITING: 0
EYE PAIN: 0
COUGH: 0
NAUSEA: 0
BACK PAIN: 0
SHORTNESS OF BREATH: 0
ABDOMINAL PAIN: 0
SORE THROAT: 0

## 2024-05-27 ASSESSMENT — PAIN DESCRIPTION - LOCATION: LOCATION: LEG

## 2024-05-27 ASSESSMENT — PAIN DESCRIPTION - DESCRIPTORS: DESCRIPTORS: NUMBNESS;PRESSURE

## 2024-05-27 ASSESSMENT — LIFESTYLE VARIABLES
HOW MANY STANDARD DRINKS CONTAINING ALCOHOL DO YOU HAVE ON A TYPICAL DAY: 1 OR 2
HOW OFTEN DO YOU HAVE A DRINK CONTAINING ALCOHOL: MONTHLY OR LESS

## 2024-05-27 ASSESSMENT — PAIN SCALES - GENERAL: PAINLEVEL_OUTOF10: 9

## 2024-05-27 NOTE — DISCHARGE INSTRUCTIONS
Follow-up for venous Doppler.  Call lab in the morning to be scheduled  Follow-up primary care provider as needed or any worsening

## 2024-06-14 ENCOUNTER — TELEPHONE (OUTPATIENT)
Dept: ORTHOPEDIC SURGERY | Age: 54
End: 2024-06-14

## 2024-06-14 NOTE — TELEPHONE ENCOUNTER
General Question     Subject: COPY OF MRI  Patient and /or Facility Request: Dusty Jauregui   Contact Number: [phone]     PT CLLED TO REQ TO GET A COPY OF HIS MRI AND LAST VISIT NOTES WITH RESTRICTIONS HE WOULD LIKE TO COME BY AND     PLEASE CLL TO ADV

## 2024-06-17 ENCOUNTER — TELEPHONE (OUTPATIENT)
Dept: ORTHOPEDIC SURGERY | Age: 54
End: 2024-06-17

## 2024-07-06 ENCOUNTER — HOSPITAL ENCOUNTER (EMERGENCY)
Age: 54
Discharge: HOME | End: 2024-07-06
Attending: EMERGENCY MEDICINE
Payer: COMMERCIAL

## 2024-07-06 VITALS
SYSTOLIC BLOOD PRESSURE: 130 MMHG | RESPIRATION RATE: 18 BRPM | BODY MASS INDEX: 23.44 KG/M2 | DIASTOLIC BLOOD PRESSURE: 79 MMHG | HEIGHT: 69 IN | OXYGEN SATURATION: 100 % | WEIGHT: 158.29 LBS | TEMPERATURE: 97.6 F | HEART RATE: 67 BPM

## 2024-07-06 DIAGNOSIS — N41.1 CHRONIC PROSTATITIS: Primary | ICD-10-CM

## 2024-07-06 LAB
BACTERIA URNS QL MICRO: NORMAL /HPF
BILIRUB UR QL STRIP.AUTO: NEGATIVE
CLARITY UR: CLEAR
COLOR UR: YELLOW
EPI CELLS #/AREA URNS AUTO: 0 /HPF (ref 0–5)
GLUCOSE UR STRIP.AUTO-MCNC: NEGATIVE MG/DL
HGB UR QL STRIP.AUTO: NEGATIVE
HYALINE CASTS #/AREA URNS AUTO: 0 /LPF (ref 0–8)
KETONES UR STRIP.AUTO-MCNC: NEGATIVE MG/DL
LEUKOCYTE ESTERASE UR QL STRIP.AUTO: NEGATIVE
NITRITE UR QL STRIP.AUTO: NEGATIVE
PH UR STRIP.AUTO: 5.5 [PH] (ref 5–8)
PROT UR STRIP.AUTO-MCNC: NEGATIVE MG/DL
RBC CLUMPS #/AREA URNS AUTO: 0 /HPF (ref 0–4)
SP GR UR STRIP.AUTO: 1.02 (ref 1–1.03)
UA DIPSTICK W REFLEX MICRO PNL UR: NORMAL
URN SPEC COLLECT METH UR: NORMAL
UROBILINOGEN UR STRIP-ACNC: 0.2 E.U./DL
WBC #/AREA URNS AUTO: 0 /HPF (ref 0–5)

## 2024-07-06 PROCEDURE — 81001 URINALYSIS AUTO W/SCOPE: CPT

## 2024-07-06 PROCEDURE — 99283 EMERGENCY DEPT VISIT LOW MDM: CPT

## 2024-07-06 PROCEDURE — 6370000000 HC RX 637 (ALT 250 FOR IP): Performed by: EMERGENCY MEDICINE

## 2024-07-06 RX ORDER — IBUPROFEN 400 MG/1
400 TABLET ORAL 3 TIMES DAILY
Qty: 21 TABLET | Refills: 0 | Status: SHIPPED | OUTPATIENT
Start: 2024-07-06 | End: 2024-07-13

## 2024-07-06 RX ORDER — ACETAMINOPHEN 500 MG
1000 TABLET ORAL ONCE
Status: COMPLETED | OUTPATIENT
Start: 2024-07-06 | End: 2024-07-06

## 2024-07-06 RX ADMIN — ACETAMINOPHEN 1000 MG: 500 TABLET ORAL at 08:37

## 2024-07-06 ASSESSMENT — PAIN SCALES - GENERAL
PAINLEVEL_OUTOF10: 0
PAINLEVEL_OUTOF10: 7
PAINLEVEL_OUTOF10: 7

## 2024-07-06 ASSESSMENT — PAIN - FUNCTIONAL ASSESSMENT: PAIN_FUNCTIONAL_ASSESSMENT: 0-10

## 2024-07-06 ASSESSMENT — PAIN DESCRIPTION - LOCATION: LOCATION: RECTUM

## 2024-07-06 ASSESSMENT — PAIN DESCRIPTION - DESCRIPTORS: DESCRIPTORS: BURNING

## 2024-07-06 NOTE — ED TRIAGE NOTES
Pt to ed from home with pain upon urination and frequency, strong smelling urine, sore scrotum and throbbing feeling, and fevers,

## 2024-07-06 NOTE — ED PROVIDER NOTES
LABS  I have reviewed all labs for this visit.   Results for orders placed or performed during the hospital encounter of 07/06/24   Urinalysis with Microscopic   Result Value Ref Range    Color, UA Yellow Straw/Yellow    Clarity, UA Clear Clear    Glucose, Ur Negative Negative mg/dL    Bilirubin, Urine Negative Negative    Ketones, Urine Negative Negative mg/dL    Specific Gravity, UA 1.024 1.005 - 1.030    Blood, Urine Negative Negative    pH, Urine 5.5 5.0 - 8.0    Protein, UA Negative Negative mg/dL    Urobilinogen, Urine 0.2 <2.0 E.U./dL    Nitrite, Urine Negative Negative    Leukocyte Esterase, Urine Negative Negative    Microscopic Examination Not Indicated     Urine Type NotGiven     Bacteria, UA None Seen None Seen /HPF    Hyaline Casts, UA 0 0 - 8 /LPF    WBC, UA 0 0 - 5 /HPF    RBC, UA 0 0 - 4 /HPF    Epithelial Cells, UA 0 0 - 5 /HPF         RADIOLOGY  I have reviewed all radiographic studies for this visit.   No orders to display        ED COURSE/MDM    54 y.o. male presents with urinary symptoms and prostate discomfort.  He is afebrile and nontoxic in appearance with otherwise reassuring vital signs.  His abdominal exam is benign.  Rectal exam reveals no prostate tenderness.  Urinalysis shows no evidence of UTI.  He is already been treated with multiple rounds of antibiotics and follows with urology.  His presentation seems most consistent with a chronic prostatitis.  He is already on Flomax.  I will prescribe ibuprofen 400 mg TID x 1 wk.  I think he would be best served by continuing to follow with urology in the outpatient setting.  I do not believe there is an indication for any additional antibiotics at this juncture.  The scrotum and perineum have no overlying erythema or tenderness.  The presentation is not consistent with Micheal's gangrene.  Discharged with follow-up with urology in the outpatient setting and usual strict return precautions for new or worsening symptoms given.    -  History obtained from: pt     I, Yunior Funez MD, am the primary clinician of record.     During the patient's ED course, the patient was given:  Medications   acetaminophen (TYLENOL) tablet 1,000 mg (1,000 mg Oral Given 7/6/24 0837)        Patient instructed to follow up with:   [facility]  [address]  [address]rd [address]  [place] [address]  [phone]  Schedule an appointment as soon as possible for a visit       [facility] Emergency Department  [address] [address]  [phone]    As needed, if symptoms worsen      All questions were answered and the patient/family expressed understanding and agreement with the plan.     PROCEDURES  None    CRITICAL CARE  N/A    CLINICAL IMPRESSION  1. Chronic prostatitis        DISPOSITION  Decision To Discharge 07/06/2024 09:36:51 AM     Yunior Funez MD    Note: This chart was created using voice recognition dictation software. Efforts were made by me to ensure accuracy, however some errors may be present due to limitations of this technology and occasionally words are not transcribed correctly.       Yunior Funez MD  07/06/24 1042

## 2024-07-06 NOTE — DISCHARGE INSTRUCTIONS
Dear Dusty Jauregui,     Thank you for the privilege of caring for you today in the Emergency Department.     Take Flomax, as previously prescribed.     Take ibuprofen, 400 mg three times daily. Do not mix with any other NSAIDs (e.g. naproxen, meloxicam, etc.).     Please return to the Emergency Department if you develop any new or worsening symptoms, fever, inability to urinate, abdominal pain, or for any other concerns.     Regards,     Yunior Funez MD, FACEP

## 2024-11-15 ENCOUNTER — APPOINTMENT (OUTPATIENT)
Dept: GENERAL RADIOLOGY | Age: 54
End: 2024-11-15
Payer: COMMERCIAL

## 2024-11-15 ENCOUNTER — HOSPITAL ENCOUNTER (INPATIENT)
Age: 54
LOS: 7 days | Discharge: HOME | End: 2024-11-22
Attending: EMERGENCY MEDICINE | Admitting: INTERNAL MEDICINE
Payer: COMMERCIAL

## 2024-11-15 ENCOUNTER — APPOINTMENT (OUTPATIENT)
Dept: CT IMAGING | Age: 54
End: 2024-11-15
Payer: COMMERCIAL

## 2024-11-15 DIAGNOSIS — R91.1 PULMONARY NODULE: ICD-10-CM

## 2024-11-15 DIAGNOSIS — D69.6 THROMBOCYTOPENIA (HCC): ICD-10-CM

## 2024-11-15 DIAGNOSIS — S09.90XA INJURY OF HEAD, INITIAL ENCOUNTER: ICD-10-CM

## 2024-11-15 DIAGNOSIS — J18.9 PNEUMONIA OF LEFT LOWER LOBE DUE TO INFECTIOUS ORGANISM: ICD-10-CM

## 2024-11-15 DIAGNOSIS — E87.6 HYPOKALEMIA: ICD-10-CM

## 2024-11-15 DIAGNOSIS — R55 SYNCOPE AND COLLAPSE: ICD-10-CM

## 2024-11-15 DIAGNOSIS — N17.9 AKI (ACUTE KIDNEY INJURY) (HCC): ICD-10-CM

## 2024-11-15 DIAGNOSIS — A41.9 SEPSIS, DUE TO UNSPECIFIED ORGANISM, UNSPECIFIED WHETHER ACUTE ORGAN DYSFUNCTION PRESENT (HCC): Primary | ICD-10-CM

## 2024-11-15 LAB
ABO + RH BLD: NORMAL
ALBUMIN SERPL-MCNC: 4.1 G/DL (ref 3.4–5)
ALBUMIN/GLOB SERPL: 0.9 {RATIO} (ref 1.1–2.2)
ALP SERPL-CCNC: 61 U/L (ref 40–129)
ALT SERPL-CCNC: 58 U/L (ref 10–40)
ANION GAP SERPL CALCULATED.3IONS-SCNC: 13 MMOL/L (ref 3–16)
ANISOCYTOSIS BLD QL SMEAR: ABNORMAL
AST SERPL-CCNC: 118 U/L (ref 15–37)
BACTERIA URNS QL MICRO: ABNORMAL /HPF
BASOPHILS # BLD: 0 K/UL (ref 0–0.2)
BASOPHILS NFR BLD: 0 %
BILIRUB SERPL-MCNC: 0.6 MG/DL (ref 0–1)
BILIRUB UR QL STRIP.AUTO: NEGATIVE
BLD GP AB SCN SERPL QL: NORMAL
BUN SERPL-MCNC: 43 MG/DL (ref 7–20)
CALCIUM SERPL-MCNC: 8.5 MG/DL (ref 8.3–10.6)
CHLORIDE SERPL-SCNC: 95 MMOL/L (ref 99–110)
CLARITY UR: ABNORMAL
CO2 SERPL-SCNC: 22 MMOL/L (ref 21–32)
COARSE GRAN CASTS #/AREA URNS LPF: ABNORMAL /LPF (ref 0–2)
COLOR UR: YELLOW
CREAT SERPL-MCNC: 1.3 MG/DL (ref 0.9–1.3)
DACRYOCYTES BLD QL SMEAR: ABNORMAL
DEPRECATED RDW RBC AUTO: 16.5 % (ref 12.4–15.4)
EKG ATRIAL RATE: 67 BPM
EKG ATRIAL RATE: 93 BPM
EKG DIAGNOSIS: NORMAL
EKG DIAGNOSIS: NORMAL
EKG P AXIS: 61 DEGREES
EKG P AXIS: 68 DEGREES
EKG P-R INTERVAL: 148 MS
EKG P-R INTERVAL: 166 MS
EKG Q-T INTERVAL: 390 MS
EKG Q-T INTERVAL: 442 MS
EKG QRS DURATION: 126 MS
EKG QRS DURATION: 128 MS
EKG QTC CALCULATION (BAZETT): 467 MS
EKG QTC CALCULATION (BAZETT): 484 MS
EKG R AXIS: 88 DEGREES
EKG R AXIS: 99 DEGREES
EKG T AXIS: 55 DEGREES
EKG T AXIS: 60 DEGREES
EKG VENTRICULAR RATE: 67 BPM
EKG VENTRICULAR RATE: 93 BPM
EOSINOPHIL # BLD: 0 K/UL (ref 0–0.6)
EOSINOPHIL NFR BLD: 0 %
FLUAV + FLUBV AG NOSE IA.RAPID: NOT DETECTED
FLUAV + FLUBV AG NOSE IA.RAPID: NOT DETECTED
GFR SERPLBLD CREATININE-BSD FMLA CKD-EPI: 64 ML/MIN/{1.73_M2}
GLUCOSE BLD-MCNC: 116 MG/DL (ref 70–99)
GLUCOSE SERPL-MCNC: 97 MG/DL (ref 70–99)
GLUCOSE UR STRIP.AUTO-MCNC: NEGATIVE MG/DL
HCT VFR BLD AUTO: 30.9 % (ref 40.5–52.5)
HGB BLD-MCNC: 10 G/DL (ref 13.5–17.5)
HGB UR QL STRIP.AUTO: ABNORMAL
KETONES UR STRIP.AUTO-MCNC: ABNORMAL MG/DL
LACTATE BLDV-SCNC: 1 MMOL/L (ref 0.4–1.9)
LACTATE BLDV-SCNC: 1.2 MMOL/L (ref 0.4–1.9)
LEUKOCYTE ESTERASE UR QL STRIP.AUTO: NEGATIVE
LYMPHOCYTES # BLD: 0.3 K/UL (ref 1–5.1)
LYMPHOCYTES NFR BLD: 9 %
MAGNESIUM SERPL-MCNC: 2.21 MG/DL (ref 1.8–2.4)
MCH RBC QN AUTO: 26.8 PG (ref 26–34)
MCHC RBC AUTO-ENTMCNC: 32.4 G/DL (ref 31–36)
MCV RBC AUTO: 82.8 FL (ref 80–100)
MONOCYTES # BLD: 0.7 K/UL (ref 0–1.3)
MONOCYTES NFR BLD: 19 %
NEUTROPHILS # BLD: 2.5 K/UL (ref 1.7–7.7)
NEUTROPHILS NFR BLD: 38 %
NEUTS BAND NFR BLD MANUAL: 34 % (ref 0–7)
NITRITE UR QL STRIP.AUTO: NEGATIVE
OVALOCYTES BLD QL SMEAR: ABNORMAL
PATH INTERP BLD-IMP: NORMAL
PATH INTERP BLD-IMP: YES
PERFORMED ON: ABNORMAL
PH UR STRIP.AUTO: 5 [PH] (ref 5–8)
PLATELET # BLD AUTO: 11 K/UL (ref 135–450)
PLATELET BLD QL SMEAR: ABNORMAL
PMV BLD AUTO: 10.3 FL (ref 5–10.5)
POIKILOCYTOSIS BLD QL SMEAR: ABNORMAL
POTASSIUM SERPL-SCNC: 3.2 MMOL/L (ref 3.5–5.1)
PROT SERPL-MCNC: 8.6 G/DL (ref 6.4–8.2)
PROT UR STRIP.AUTO-MCNC: 30 MG/DL
RBC # BLD AUTO: 3.73 M/UL (ref 4.2–5.9)
RBC #/AREA URNS HPF: ABNORMAL /HPF (ref 0–4)
REASON FOR REJECTION: NORMAL
REJECTED TEST: NORMAL
REPORT: NORMAL
RESP PATH DNA+RNA PNL L RESP NAA+NON-PRB: NORMAL
SARS-COV-2 RDRP RESP QL NAA+PROBE: NOT DETECTED
SCHISTOCYTES BLD QL SMEAR: ABNORMAL
SLIDE REVIEW: ABNORMAL
SODIUM SERPL-SCNC: 130 MMOL/L (ref 136–145)
SP GR UR STRIP.AUTO: 1.02 (ref 1–1.03)
UA COMPLETE W REFLEX CULTURE PNL UR: ABNORMAL
UA DIPSTICK W REFLEX MICRO PNL UR: YES
URN SPEC COLLECT METH UR: ABNORMAL
UROBILINOGEN UR STRIP-ACNC: 0.2 E.U./DL
WBC # BLD AUTO: 3.5 K/UL (ref 4–11)
WBC #/AREA URNS HPF: ABNORMAL /HPF (ref 0–5)

## 2024-11-15 PROCEDURE — 87635 SARS-COV-2 COVID-19 AMP PRB: CPT

## 2024-11-15 PROCEDURE — 6370000000 HC RX 637 (ALT 250 FOR IP): Performed by: INTERNAL MEDICINE

## 2024-11-15 PROCEDURE — 96365 THER/PROPH/DIAG IV INF INIT: CPT

## 2024-11-15 PROCEDURE — 85025 COMPLETE CBC W/AUTO DIFF WBC: CPT

## 2024-11-15 PROCEDURE — 2580000003 HC RX 258: Performed by: PHYSICIAN ASSISTANT

## 2024-11-15 PROCEDURE — 99285 EMERGENCY DEPT VISIT HI MDM: CPT

## 2024-11-15 PROCEDURE — 96366 THER/PROPH/DIAG IV INF ADDON: CPT

## 2024-11-15 PROCEDURE — 94640 AIRWAY INHALATION TREATMENT: CPT

## 2024-11-15 PROCEDURE — 81001 URINALYSIS AUTO W/SCOPE: CPT

## 2024-11-15 PROCEDURE — 80053 COMPREHEN METABOLIC PANEL: CPT

## 2024-11-15 PROCEDURE — 86850 RBC ANTIBODY SCREEN: CPT

## 2024-11-15 PROCEDURE — 36415 COLL VENOUS BLD VENIPUNCTURE: CPT

## 2024-11-15 PROCEDURE — 93005 ELECTROCARDIOGRAM TRACING: CPT | Performed by: EMERGENCY MEDICINE

## 2024-11-15 PROCEDURE — 87641 MR-STAPH DNA AMP PROBE: CPT

## 2024-11-15 PROCEDURE — 6360000002 HC RX W HCPCS: Performed by: PHYSICIAN ASSISTANT

## 2024-11-15 PROCEDURE — 2580000003 HC RX 258: Performed by: INTERNAL MEDICINE

## 2024-11-15 PROCEDURE — 83605 ASSAY OF LACTIC ACID: CPT

## 2024-11-15 PROCEDURE — 86901 BLOOD TYPING SEROLOGIC RH(D): CPT

## 2024-11-15 PROCEDURE — 87798 DETECT AGENT NOS DNA AMP: CPT

## 2024-11-15 PROCEDURE — 87040 BLOOD CULTURE FOR BACTERIA: CPT

## 2024-11-15 PROCEDURE — 87502 INFLUENZA DNA AMP PROBE: CPT

## 2024-11-15 PROCEDURE — 6370000000 HC RX 637 (ALT 250 FOR IP): Performed by: PHYSICIAN ASSISTANT

## 2024-11-15 PROCEDURE — 87633 RESP VIRUS 12-25 TARGETS: CPT

## 2024-11-15 PROCEDURE — 71250 CT THORAX DX C-: CPT

## 2024-11-15 PROCEDURE — 70450 CT HEAD/BRAIN W/O DYE: CPT

## 2024-11-15 PROCEDURE — 30233N1 TRANSFUSION OF NONAUTOLOGOUS RED BLOOD CELLS INTO PERIPHERAL VEIN, PERCUTANEOUS APPROACH: ICD-10-PCS | Performed by: INTERNAL MEDICINE

## 2024-11-15 PROCEDURE — 71045 X-RAY EXAM CHEST 1 VIEW: CPT

## 2024-11-15 PROCEDURE — 70486 CT MAXILLOFACIAL W/O DYE: CPT

## 2024-11-15 PROCEDURE — 96367 TX/PROPH/DG ADDL SEQ IV INF: CPT

## 2024-11-15 PROCEDURE — 1200000000 HC SEMI PRIVATE

## 2024-11-15 PROCEDURE — 2060000000 HC ICU INTERMEDIATE R&B

## 2024-11-15 PROCEDURE — P9035 PLATELET PHERES LEUKOREDUCED: HCPCS

## 2024-11-15 PROCEDURE — 83735 ASSAY OF MAGNESIUM: CPT

## 2024-11-15 PROCEDURE — 93010 ELECTROCARDIOGRAM REPORT: CPT | Performed by: INTERNAL MEDICINE

## 2024-11-15 PROCEDURE — 6360000002 HC RX W HCPCS: Performed by: INTERNAL MEDICINE

## 2024-11-15 PROCEDURE — 86900 BLOOD TYPING SEROLOGIC ABO: CPT

## 2024-11-15 PROCEDURE — 87449 NOS EACH ORGANISM AG IA: CPT

## 2024-11-15 PROCEDURE — 72125 CT NECK SPINE W/O DYE: CPT

## 2024-11-15 RX ORDER — ACETAMINOPHEN 325 MG/1
650 TABLET ORAL EVERY 6 HOURS PRN
Status: DISCONTINUED | OUTPATIENT
Start: 2024-11-15 | End: 2024-11-22 | Stop reason: HOSPADM

## 2024-11-15 RX ORDER — BELIMUMAB 200 MG/ML
200 SOLUTION SUBCUTANEOUS
COMMUNITY

## 2024-11-15 RX ORDER — IBUPROFEN 600 MG/1
600 TABLET, FILM COATED ORAL EVERY 6 HOURS PRN
Status: ON HOLD | COMMUNITY
Start: 2024-10-03 | End: 2024-11-22 | Stop reason: HOSPADM

## 2024-11-15 RX ORDER — SODIUM CHLORIDE 0.9 % (FLUSH) 0.9 %
5-40 SYRINGE (ML) INJECTION EVERY 12 HOURS SCHEDULED
Status: DISCONTINUED | OUTPATIENT
Start: 2024-11-15 | End: 2024-11-22 | Stop reason: HOSPADM

## 2024-11-15 RX ORDER — BACLOFEN 10 MG/1
10 TABLET ORAL 3 TIMES DAILY PRN
COMMUNITY

## 2024-11-15 RX ORDER — ONDANSETRON 4 MG/1
4 TABLET, ORALLY DISINTEGRATING ORAL EVERY 8 HOURS PRN
Status: DISCONTINUED | OUTPATIENT
Start: 2024-11-15 | End: 2024-11-22 | Stop reason: HOSPADM

## 2024-11-15 RX ORDER — SODIUM CHLORIDE 9 MG/ML
INJECTION, SOLUTION INTRAVENOUS PRN
Status: DISCONTINUED | OUTPATIENT
Start: 2024-11-15 | End: 2024-11-22 | Stop reason: HOSPADM

## 2024-11-15 RX ORDER — IPRATROPIUM BROMIDE AND ALBUTEROL SULFATE 2.5; .5 MG/3ML; MG/3ML
1 SOLUTION RESPIRATORY (INHALATION)
Status: DISCONTINUED | OUTPATIENT
Start: 2024-11-15 | End: 2024-11-22 | Stop reason: HOSPADM

## 2024-11-15 RX ORDER — ENOXAPARIN SODIUM 100 MG/ML
40 INJECTION SUBCUTANEOUS DAILY
Status: CANCELLED | OUTPATIENT
Start: 2024-11-15

## 2024-11-15 RX ORDER — ACETAMINOPHEN 650 MG/1
650 SUPPOSITORY RECTAL EVERY 6 HOURS PRN
Status: DISCONTINUED | OUTPATIENT
Start: 2024-11-15 | End: 2024-11-22 | Stop reason: HOSPADM

## 2024-11-15 RX ORDER — ONDANSETRON 2 MG/ML
4 INJECTION INTRAMUSCULAR; INTRAVENOUS EVERY 6 HOURS PRN
Status: DISCONTINUED | OUTPATIENT
Start: 2024-11-15 | End: 2024-11-22 | Stop reason: HOSPADM

## 2024-11-15 RX ORDER — POTASSIUM CHLORIDE 1500 MG/1
40 TABLET, EXTENDED RELEASE ORAL ONCE
Status: COMPLETED | OUTPATIENT
Start: 2024-11-15 | End: 2024-11-15

## 2024-11-15 RX ORDER — HYDROCHLOROTHIAZIDE 25 MG/1
25 TABLET ORAL DAILY
Status: DISCONTINUED | OUTPATIENT
Start: 2024-11-16 | End: 2024-11-21

## 2024-11-15 RX ORDER — 0.9 % SODIUM CHLORIDE 0.9 %
1000 INTRAVENOUS SOLUTION INTRAVENOUS ONCE
Status: COMPLETED | OUTPATIENT
Start: 2024-11-15 | End: 2024-11-15

## 2024-11-15 RX ORDER — VANCOMYCIN 1.75 G/350ML
1250 INJECTION, SOLUTION INTRAVENOUS ONCE
Status: COMPLETED | OUTPATIENT
Start: 2024-11-15 | End: 2024-11-15

## 2024-11-15 RX ORDER — MYCOPHENOLATE MOFETIL 250 MG/1
1000 CAPSULE ORAL 2 TIMES DAILY
Status: DISCONTINUED | OUTPATIENT
Start: 2024-11-15 | End: 2024-11-22 | Stop reason: HOSPADM

## 2024-11-15 RX ORDER — IPRATROPIUM BROMIDE AND ALBUTEROL SULFATE 2.5; .5 MG/3ML; MG/3ML
1 SOLUTION RESPIRATORY (INHALATION)
Status: DISCONTINUED | OUTPATIENT
Start: 2024-11-15 | End: 2024-11-15

## 2024-11-15 RX ORDER — IPRATROPIUM BROMIDE AND ALBUTEROL SULFATE 2.5; .5 MG/3ML; MG/3ML
1 SOLUTION RESPIRATORY (INHALATION) EVERY 4 HOURS PRN
Status: DISCONTINUED | OUTPATIENT
Start: 2024-11-15 | End: 2024-11-15

## 2024-11-15 RX ORDER — SODIUM CHLORIDE 0.9 % (FLUSH) 0.9 %
5-40 SYRINGE (ML) INJECTION PRN
Status: DISCONTINUED | OUTPATIENT
Start: 2024-11-15 | End: 2024-11-22 | Stop reason: HOSPADM

## 2024-11-15 RX ORDER — TAMSULOSIN HYDROCHLORIDE 0.4 MG/1
0.4 CAPSULE ORAL DAILY
COMMUNITY
Start: 2024-05-06

## 2024-11-15 RX ORDER — POLYETHYLENE GLYCOL 3350 17 G/17G
17 POWDER, FOR SOLUTION ORAL DAILY PRN
Status: DISCONTINUED | OUTPATIENT
Start: 2024-11-15 | End: 2024-11-22 | Stop reason: HOSPADM

## 2024-11-15 RX ORDER — ACETAMINOPHEN 500 MG
1000 TABLET ORAL ONCE
Status: COMPLETED | OUTPATIENT
Start: 2024-11-15 | End: 2024-11-15

## 2024-11-15 RX ORDER — VANCOMYCIN 1.75 G/350ML
1250 INJECTION, SOLUTION INTRAVENOUS
Status: DISCONTINUED | OUTPATIENT
Start: 2024-11-16 | End: 2024-11-18

## 2024-11-15 RX ORDER — M-VIT,TX,IRON,MINS/CALC/FOLIC 27MG-0.4MG
1 TABLET ORAL DAILY
Status: DISCONTINUED | OUTPATIENT
Start: 2024-11-16 | End: 2024-11-22 | Stop reason: HOSPADM

## 2024-11-15 RX ORDER — LISINOPRIL 40 MG/1
40 TABLET ORAL DAILY
Status: DISCONTINUED | OUTPATIENT
Start: 2024-11-16 | End: 2024-11-21

## 2024-11-15 RX ORDER — TAMSULOSIN HYDROCHLORIDE 0.4 MG/1
0.4 CAPSULE ORAL NIGHTLY
Status: DISCONTINUED | OUTPATIENT
Start: 2024-11-15 | End: 2024-11-22 | Stop reason: HOSPADM

## 2024-11-15 RX ORDER — MYCOPHENOLATE MOFETIL 500 MG/1
1000 TABLET ORAL 2 TIMES DAILY
COMMUNITY
Start: 2024-01-23

## 2024-11-15 RX ORDER — BACLOFEN 10 MG/1
10 TABLET ORAL 3 TIMES DAILY PRN
Status: DISCONTINUED | OUTPATIENT
Start: 2024-11-15 | End: 2024-11-22 | Stop reason: HOSPADM

## 2024-11-15 RX ADMIN — MYCOPHENOLATE MOFETIL 1000 MG: 250 CAPSULE ORAL at 21:18

## 2024-11-15 RX ADMIN — TAMSULOSIN HYDROCHLORIDE 0.4 MG: 0.4 CAPSULE ORAL at 21:14

## 2024-11-15 RX ADMIN — IPRATROPIUM BROMIDE AND ALBUTEROL SULFATE 1 DOSE: .5; 2.5 SOLUTION RESPIRATORY (INHALATION) at 19:34

## 2024-11-15 RX ADMIN — ACETAMINOPHEN 1000 MG: 500 TABLET ORAL at 11:58

## 2024-11-15 RX ADMIN — ACETAMINOPHEN 325MG 650 MG: 325 TABLET ORAL at 21:14

## 2024-11-15 RX ADMIN — SODIUM CHLORIDE 1000 ML: 0.9 INJECTION, SOLUTION INTRAVENOUS at 14:47

## 2024-11-15 RX ADMIN — CEFEPIME 2000 MG: 2 INJECTION, POWDER, FOR SOLUTION INTRAVENOUS at 11:58

## 2024-11-15 RX ADMIN — VANCOMYCIN 1250 MG: 1.75 INJECTION, SOLUTION INTRAVENOUS at 12:45

## 2024-11-15 RX ADMIN — SODIUM CHLORIDE, PRESERVATIVE FREE 10 ML: 5 INJECTION INTRAVENOUS at 21:26

## 2024-11-15 RX ADMIN — POTASSIUM CHLORIDE 40 MEQ: 1500 TABLET, EXTENDED RELEASE ORAL at 16:04

## 2024-11-15 RX ADMIN — BACLOFEN 10 MG: 10 TABLET ORAL at 21:14

## 2024-11-15 ASSESSMENT — PAIN - FUNCTIONAL ASSESSMENT
PAIN_FUNCTIONAL_ASSESSMENT: 0-10
PAIN_FUNCTIONAL_ASSESSMENT: ACTIVITIES ARE NOT PREVENTED

## 2024-11-15 ASSESSMENT — PAIN DESCRIPTION - ORIENTATION
ORIENTATION: LOWER;MID
ORIENTATION: OTHER (COMMENT)

## 2024-11-15 ASSESSMENT — LIFESTYLE VARIABLES
HOW OFTEN DO YOU HAVE A DRINK CONTAINING ALCOHOL: NEVER
HOW MANY STANDARD DRINKS CONTAINING ALCOHOL DO YOU HAVE ON A TYPICAL DAY: PATIENT DOES NOT DRINK

## 2024-11-15 ASSESSMENT — PAIN DESCRIPTION - LOCATION
LOCATION: BACK
LOCATION: NECK;GENERALIZED

## 2024-11-15 ASSESSMENT — PAIN SCALES - GENERAL
PAINLEVEL_OUTOF10: 4
PAINLEVEL_OUTOF10: 10

## 2024-11-15 ASSESSMENT — PAIN DESCRIPTION - DESCRIPTORS: DESCRIPTORS: DISCOMFORT

## 2024-11-15 NOTE — ED TRIAGE NOTES
Pt to Er from home c/o \"extreme lower back pain from 2022 from lifting heavy objects\". Pt has long history for back pain which resulted in 2 bulging discs. Pt states he cannot stand up tall or walk very far without having to sit down. Pain 10/10

## 2024-11-15 NOTE — CONSULTS
Clinical Pharmacy Note  Vancomycin Consult    Dusty Jauregui is a 54 y.o. male ordered vancomycin for CAP; consult received from Dr. Pereyra to manage therapy. Also receiving cefepime.    Allergies:  Bactrim [sulfamethoxazole-trimethoprim]     Temp max:  Temp (24hrs), Av °F (37.2 °C), Min:97.6 °F (36.4 °C), Max:101.2 °F (38.4 °C)      Recent Labs     11/15/24  1143   WBC 3.5*       Recent Labs     11/15/24  1103   BUN 43*   CREATININE 1.3         Intake/Output Summary (Last 24 hours) at 11/15/2024 1834  Last data filed at 11/15/2024 1636  Gross per 24 hour   Intake 1350 ml   Output --   Net 1350 ml       Culture Results:  pending    Ht Readings from Last 1 Encounters:   11/15/24 1.753 m (5' 9\")        Wt Readings from Last 1 Encounters:   11/15/24 68.5 kg (151 lb)         Estimated Creatinine Clearance: 61 mL/min (based on SCr of 1.3 mg/dL).    Assessment/Plan:  Day # 1 of vancomycin.  Vancomycin 1250 mg IV every 18 hours. Had first dose in ER at 1300  Goal -600  Predicted AUC   [id number]: 481 mg/L.hr  AUC24,ss: 557 mg/L.hr      Thank you for the consult.    Javad [facility], 11/15/2024 6:35 PM

## 2024-11-15 NOTE — ED PROVIDER NOTES
I independently evaluated and obtained a history and physical on Dusty Jauregui. I personally saw the patient and made/approved the management plan and take responsibility for the patient management.    All diagnostic, treatment, and disposition assistants were made to myself in conjunction the advanced practice provider.    For further details of this patient's emergency department encounter, please see the advanced practice provider's documentation.    History: I was asked to see this patient.  He comes in today with a history of monoclonal gammopathy of unknown significance.  He also has a history of lupus.  The patient states for the last few weeks he has had subjective fever, chills, body aches and fatigue.  He also endorses an increased cough.  States he has been having loose bowel movements for the last 3 to 4 days.    Physician Exam: PHYSICAL EXAM  /65   Pulse 83   Temp (!) 101.2 °F (38.4 °C) (Oral)   Resp 22   Ht 1.753 m (5' 9\")   Wt 68.5 kg (151 lb)   SpO2 100%   BMI 22.30 kg/m²   GENERAL APPEARANCE: Awake and alert. Well appearing. No acute distress.  HEAD: Normocephalic. Right frontal contusion. No blood or fluid from ears or nose.  Neck: C-collar in place. Cervical spinal tenderness on examination  EYES: No scleral icterus  ENT: Mucous membranes are moist.   CHEST: Equal symmetric chest rise.  LUNGS: Breathing is unlabored. Speaking comfortably in full sentences.  Breath sounds equal bilaterally.  No wheezing rales rhonchi.  Abdomen: Soft, nontender, nondistended  EXTREMITIES: No deformities. Non-tender.  SKIN: Warm and dry.  No rash  NEUROLOGICAL: Normal speech and thought. GCS 15. Strength and sensation normal in UE and LE.      The Ekg interpreted by me shows  normal sinus rhythm with a rate of 67  Axis is   68  QTc is   467 ms  RBBB   ST Segments: nonspecific changes  No significant change from prior EKG dated 1/2/2023        I personally saw the patient and performed a substantive  portion of the visit including all aspects of the medical decision making.    MDM:     This patient presented to emergency department with presumed sepsis.  He had a fever and tachycardia and suspected infection.  He was started on antibiotics.  His workup in the emergency department was significant for a white count of 3.5.  Hemoglobin of 10, hematocrit of 30.9.  Platelets of 11.  Patient has 34 bands.  COVID and flu are negative.  Lactic acid was normal.  Patient's BUN was 43 with a creatinine of 1.3.  His urinalysis is pending.  Magnesium is normal.  Potassium is 3.2.  ALT is 58 and AST is 118.  I was advised that the patient was going to use a bedside urinal.  They lowered the patient's rail and he was sitting on the side of the bed when he passed out and fell out of the bed and struck his head.  Patient now complains of some headache, midface pain, and some neck pain.  His neurologic exam is nonfocal at this time.  Patient will undergo CT imaging to make sure that he does not have an intracranial hemorrhage or other injury from the fall.    CT of the patient cervical spine shows no acute findings of the C-spine.  CT head without contrast shows no evidence of acute intercranial abnormality.  CT of the facial bones shows no acute facial bone fracture.  He does have some small hematomas over the right frontal bone.  Chest CT reveals:  Ground-glass opacities and subpleural reticular markings are noted at the  lung bases, most prominently seen in the left lower lobe, also present to  lesser degree in the subpleural bilateral upper lobes.  Findings are most  suggestive of an infectious/inflammatory process, less likely early fibrotic  change given lack of significant bronchiectasis.  2. A 1.9 cm nodular right lower lobe opacity demonstrates tiny internal  calcifications, possibly related to prior granulomatous disease.  Recommend  follow-up CT in 3 months.  3. A borderline prominent left lower paratracheal lymph node

## 2024-11-15 NOTE — ED NOTES
EKG My Reading:  Rate: 67  Rhythm: sinus  ST Segments: RBBB is present, deeply inverted T waves in leads V2-V4  STEMI: no  QTc: 467 (prolonged)  Comparison to prior: Not substantially changed from earlier today       Alfred Prince MD  11/15/24 0983

## 2024-11-15 NOTE — CONSULTS
Clinical Pharmacy Note  Vancomycin Consult    Pharmacy consult received for one-time dose of vancomycin in the Emergency Department per Yuki Mina PA-C.    Ht Readings from Last 1 Encounters:   11/15/24 1.753 m (5' 9\")        Wt Readings from Last 1 Encounters:   11/15/24 68.5 kg (151 lb)         Assessment/Plan:  Vancomycin 1250 mg x 1 in ED.  If vancomycin is to continue on admission and pharmacy is to manage dosing, please re-consult with admission orders.      Jericho Han RPH  11/15/2024 11:53 AM

## 2024-11-15 NOTE — ED NOTES
Pt given urinal to provide urine sample. Sat with legs over edge of bed. Patient appeared stable. Upon questioning, patient stated no dizziness. Call light placed within reach, pointed to and instructed patient to hit the call light if needed. Instructed pt to not attempt to stand. Pt verbalized understanding.

## 2024-11-15 NOTE — ED PROVIDER NOTES
EMERGENCY DEPARTMENT ENCOUNTER        Pt Name: Dusty Jauregui  MRN: [id number]  Birthdate 1970  Date of evaluation: 11/15/2024  Provider: Yuki Mina PA-C  PCP: Linda Carpenter APRN - CNP  Note Started: 11:09 AM EST 11/15/24       I have seen and evaluated this patient with my supervising physician Chandana Fisher*.      CHIEF COMPLAINT       Chief Complaint   Patient presents with    Back Pain    Illness     Pt has autoimmune disorder and has had fever off and on for the last 5 days.       HISTORY OF PRESENT ILLNESS: 1 or more Elements     History From: patient    Dusty Jauregui is a 54 y.o. male who presents for subjective fever, chills, body aches, and fatigue for the past 1 to 2 weeks.  He is also had a cough for the past month that occurs when he is exposed to cold air.  Cough is occasionally productive of white sputum.  Denies hemoptysis.  He has had diarrhea for the past 3 to 4 days.  2 episodes a day.  Denies bloody or black stools.  Has had decreased appetite.  No vomiting.  Denies chest pain shortness of breath.  Denies congestion or rhinorrhea.  Denies abdominal pain.  He has a history of lupus and is noncompliant with CellCept.  Last took 4- 5 days ago.  Also has a history of monoclonal gammopathy of unknown significance.  Has not taken any Tylenol ibuprofen yet today.  Also reports his chronic low back pain has been worse than usual.  Reports history of some bulging lumbar disc.  Denies any recent spinal injections or surgical procedures.  Also reports some dysuria and dark urine for the past few days.    Nursing Notes were reviewed and agreed with or any disagreements were addressed in the HPI.    REVIEW OF SYSTEMS :      Review of Systems    Positives and Pertinent negatives as per HPI.     SURGICAL HISTORY   History reviewed. No pertinent surgical history.    CURRENTMEDICATIONS       Current Discharge Medication List        CONTINUE these medications which  independently provided 35 minutes of non-concurrent critical care out of the total shared critical care time provided. Critical care time was excluding separately reportable procedures. There was a high probability of clinically significant/life threatening deterioration in the patient's condition which required my urgent intervention.  Time spent managing patient's sepsis, pneumonia, syncope with medications, reevaluations, consultation, chart review            Chronic Conditions: SLE, MGUS      I am the Primary Clinician of Record.    Is this patient to be included in the SEP-1 Core Measure due to severe sepsis or septic shock?   No   Exclusion criteria - the patient is NOT to be included for SEP-1 Core Measure due to:  May have criteria for sepsis, but does not meet criteria for severe sepsis or septic shock    PROCEDURES   Unless otherwise noted below, none     Procedures    FINAL IMPRESSION      1. Sepsis, due to unspecified organism, unspecified whether acute organ dysfunction present (HCC)    2. Pneumonia of left lower lobe due to infectious organism    3. Syncope and collapse    4. Injury of head, initial encounter    5. Thrombocytopenia (HCC)    6. Hypokalemia    7. BRET (acute kidney injury) (HCC)          DISPOSITION/PLAN       DISPOSITION Admitted 11/15/2024 04:14:54 PM           PATIENT REFERRED TO:  No follow-up provider specified.    DISCHARGE MEDICATIONS:  Current Discharge Medication List          DISCONTINUED MEDICATIONS:  Current Discharge Medication List        STOP taking these medications       lidocaine viscous hcl (XYLOCAINE) 2 % SOLN solution Comments:   Reason for Stopping:         finasteride (PROPECIA) 1 MG tablet Comments:   Reason for Stopping:                      (Please note that portions of this note were completed with a voice recognition program.  Efforts were made to edit the dictations but occasionally words are mis-transcribed.)    Yuki Mina PA-C (electronically

## 2024-11-15 NOTE — PROGRESS NOTES
Medication Reconciliation    List of medications patient is currently taking is complete.     Source of information: 1. Conversation with patient at bedside                                      2. EPIC records        Notes regarding home medications:   1. Patient states he thinks he took all morning medications today KYLE Han [facility]   11/15/2024  1:11 PM

## 2024-11-15 NOTE — ED NOTES
Overheard thud in pt room, pt found on ground, pt diaphoretic and does not recall fall. Pt alert upon arrival into the room. Pt presents a little altered when answering questions. Pt stated last thing he remembers was sitting at the edge of the bed with urinal and call light within reach. ED Yuki Boss notified and at bedside. Pt BG checked 116, EKG performed and shown to ED Alfred Key.

## 2024-11-15 NOTE — PROGRESS NOTES
EDSBAR report has been reviewed.      Electronically signed by Ángel Wadsworth RN on 11/15/2024 at 4:59 PM

## 2024-11-15 NOTE — ED NOTES
Pt ambulated to room 41 from [facility] with steady gait, introduced self to pt, side rails x2, the call light is within the patient's reach, and instructions for use were provided.  The bed is in the lowest position with wheels locked.  The patient has been advised to notify staff, using the call light, if there is a need to get up or use restroom.  The patient verbalized understanding of safety precautions and how to contact staff for assistance.

## 2024-11-15 NOTE — H&P
Hospital Medicine History & Physical      PCP: Linda Carpenter APRN - CNP    Date of Admission: 11/15/2024    Date of Service: Pt seen/examined on 11/15/2024 and Admitted to Inpatient with expected LOS greater than two midnights due to medical therapy.     Chief Complaint: Weakness cough shortness of breath      History Of Present Illness:      54 y.o. male who presented to [facility] with weakness shortness of breath and cough, cough and shortness of breath progressively worsening over the last 2 weeks significantly worse in the last 2 to 3 days associated with fever chills body aches denies abdominal pain nausea vomiting or diarrhea.  Discussed with ED provider agree with this admission.  To note that patient had syncopal episode in ED CT scan negative for intracranial bleed    Past Medical History:          Diagnosis Date    Lupus (systemic lupus erythematosus) (HCC)     MGUS (monoclonal gammopathy of unknown significance)        Past Surgical History:      History reviewed. No pertinent surgical history.    Medications Prior to Admission:      Prior to Admission medications    Medication Sig Start Date End Date Taking? Authorizing Provider   baclofen (LIORESAL) 10 MG tablet Take 1 tablet by mouth 3 times daily as needed (Muscle spasm)   Yes Cecy St MD   BENLYSTA 200 MG/ML SOAJ Inject 200 mg into the skin every 7 days Sundays   Yes Cecy St MD   diclofenac sodium (VOLTAREN) 1 % GEL Apply 4 g topically 4 times daily as needed for Pain   Yes Cecy St MD   mycophenolate (CELLCEPT) 500 MG tablet Take 2 tablets by mouth 2 times daily 1/23/24  Yes Cecy St MD   tamsulosin (FLOMAX) 0.4 MG capsule Take 1 capsule by mouth daily 5/6/24  Yes Cecy St MD   ibuprofen (ADVIL;MOTRIN) 600 MG tablet Take 1 tablet by mouth every 6 hours as needed for Pain 10/3/24  Yes Cecy St MD   Multiple Vitamins-Minerals (THERAPEUTIC MULTIVITAMIN-MINERALS)  a.m.  Anemia, appears chronic, hemoglobin 10 repeat CBC in a.m.  DVT Prophylaxis: SCD  Diet: No diet orders on file  Code Status: Prior      Dispo -inpatient 2 to 3 days       Ramsey Pereyra MD    Thank you Linda Carpenter APRN - CNP for the opportunity to be involved in this patient's care. If you have any questions or concerns please feel free to contact me at [phone].    Comment: Please note this report has been produced using speech recognition software and may contain errors related to that system including errors in grammar, punctuation, and spelling, as well as words and phrases that may be inappropriate. If there are any questions or concerns, please feel free to contact the dictating provider for clarification.

## 2024-11-16 ENCOUNTER — APPOINTMENT (OUTPATIENT)
Age: 54
End: 2024-11-16
Attending: INTERNAL MEDICINE
Payer: COMMERCIAL

## 2024-11-16 PROBLEM — R50.9 FEVER AND CHILLS: Status: ACTIVE | Noted: 2024-11-16

## 2024-11-16 PROBLEM — M32.9 SYSTEMIC LUPUS ERYTHEMATOSUS (HCC): Status: ACTIVE | Noted: 2024-11-16

## 2024-11-16 PROBLEM — D61.818 PANCYTOPENIA (HCC): Status: ACTIVE | Noted: 2024-11-16

## 2024-11-16 PROBLEM — E87.6 HYPOKALEMIA: Status: ACTIVE | Noted: 2024-11-16

## 2024-11-16 PROBLEM — D69.6 THROMBOCYTOPENIA (HCC): Status: ACTIVE | Noted: 2024-11-16

## 2024-11-16 PROBLEM — J18.9 MULTIFOCAL PNEUMONIA: Status: ACTIVE | Noted: 2024-11-16

## 2024-11-16 PROBLEM — R91.1 PULMONARY NODULE: Status: ACTIVE | Noted: 2024-11-16

## 2024-11-16 PROBLEM — J18.9 PNEUMONIA OF LEFT LOWER LOBE DUE TO INFECTIOUS ORGANISM: Status: ACTIVE | Noted: 2024-11-16

## 2024-11-16 PROBLEM — R91.8 PULMONARY NODULES: Status: ACTIVE | Noted: 2024-11-16

## 2024-11-16 LAB
ACANTHOCYTES BLD QL SMEAR: ABNORMAL
ALBUMIN SERPL-MCNC: 3.4 G/DL (ref 3.4–5)
ALBUMIN/GLOB SERPL: 0.9 {RATIO} (ref 1.1–2.2)
ALP SERPL-CCNC: 56 U/L (ref 40–129)
ALT SERPL-CCNC: 45 U/L (ref 10–40)
ANION GAP SERPL CALCULATED.3IONS-SCNC: 11 MMOL/L (ref 3–16)
ANISOCYTOSIS BLD QL SMEAR: ABNORMAL
AST SERPL-CCNC: 75 U/L (ref 15–37)
BASOPHILS # BLD: 0 K/UL (ref 0–0.2)
BASOPHILS NFR BLD: 0.5 %
BILIRUB SERPL-MCNC: 0.4 MG/DL (ref 0–1)
BLOOD BANK DISPENSE STATUS: NORMAL
BLOOD BANK PRODUCT CODE: NORMAL
BPU ID: NORMAL
BUN SERPL-MCNC: 35 MG/DL (ref 7–20)
CALCIUM SERPL-MCNC: 8.3 MG/DL (ref 8.3–10.6)
CHLORIDE SERPL-SCNC: 99 MMOL/L (ref 99–110)
CO2 SERPL-SCNC: 20 MMOL/L (ref 21–32)
CREAT SERPL-MCNC: 1.2 MG/DL (ref 0.9–1.3)
CRP SERPL-MCNC: 45 MG/L (ref 0–5.1)
DEPRECATED RDW RBC AUTO: 16.5 % (ref 12.4–15.4)
DEPRECATED RDW RBC AUTO: 16.6 % (ref 12.4–15.4)
DESCRIPTION BLOOD BANK: NORMAL
ECHO AV MEAN GRADIENT: 5 MMHG
ECHO AV MEAN VELOCITY: 1 M/S
ECHO AV PEAK GRADIENT: 7 MMHG
ECHO AV PEAK VELOCITY: 1.3 M/S
ECHO AV VELOCITY RATIO: 1
ECHO AV VTI: 20.9 CM
ECHO BSA: 1.75 M2
ECHO IVC EXP: 1.1 CM
ECHO LA AREA 2C: 10.3 CM2
ECHO LA AREA 4C: 8.9 CM2
ECHO LA DIAMETER INDEX: 1.7 CM/M2
ECHO LA DIAMETER: 3 CM
ECHO LA MAJOR AXIS: 4.3 CM
ECHO LA MINOR AXIS: 3.7 CM
ECHO LA VOL BP: 20 ML (ref 18–58)
ECHO LA VOL MOD A2C: 24 ML (ref 18–58)
ECHO LA VOL MOD A4C: 15 ML (ref 18–58)
ECHO LA VOL/BSA BIPLANE: 11 ML/M2 (ref 16–34)
ECHO LA VOLUME INDEX MOD A2C: 14 ML/M2 (ref 16–34)
ECHO LA VOLUME INDEX MOD A4C: 9 ML/M2 (ref 16–34)
ECHO LV E' LATERAL VELOCITY: 13.2 CM/S
ECHO LV E' SEPTAL VELOCITY: 6.99 CM/S
ECHO LV EF PHYSICIAN: 68 %
ECHO LV FRACTIONAL SHORTENING: 33 % (ref 28–44)
ECHO LV INTERNAL DIMENSION DIASTOLE INDEX: 2.22 CM/M2
ECHO LV INTERNAL DIMENSION DIASTOLIC: 3.9 CM (ref 4.2–5.9)
ECHO LV INTERNAL DIMENSION SYSTOLIC INDEX: 1.48 CM/M2
ECHO LV INTERNAL DIMENSION SYSTOLIC: 2.6 CM
ECHO LV IVSD: 1.2 CM (ref 0.6–1)
ECHO LV MASS 2D: 149.5 G (ref 88–224)
ECHO LV MASS INDEX 2D: 85 G/M2 (ref 49–115)
ECHO LV POSTERIOR WALL DIASTOLIC: 1.1 CM (ref 0.6–1)
ECHO LV RELATIVE WALL THICKNESS RATIO: 0.56
ECHO LVOT AV VTI INDEX: 1.01
ECHO LVOT MEAN GRADIENT: 3 MMHG
ECHO LVOT PEAK GRADIENT: 7 MMHG
ECHO LVOT PEAK VELOCITY: 1.3 M/S
ECHO LVOT VTI: 21.1 CM
ECHO MV A VELOCITY: 0.82 M/S
ECHO MV E DECELERATION TIME (DT): 153 MS
ECHO MV E VELOCITY: 0.75 M/S
ECHO MV E/A RATIO: 0.91
ECHO MV E/E' LATERAL: 5.68
ECHO MV E/E' RATIO (AVERAGED): 8.21
ECHO MV E/E' SEPTAL: 10.73
ECHO MV LVOT VTI INDEX: 0.87
ECHO MV MAX VELOCITY: 1.1 M/S
ECHO MV MEAN GRADIENT: 3 MMHG
ECHO MV MEAN VELOCITY: 0.8 M/S
ECHO MV PEAK GRADIENT: 5 MMHG
ECHO MV VTI: 18.4 CM
ECHO PV MAX VELOCITY: 1.4 M/S
ECHO PV MEAN GRADIENT: 5 MMHG
ECHO PV MEAN VELOCITY: 1 M/S
ECHO PV PEAK GRADIENT: 8 MMHG
ECHO PV VTI: 16 CM
ECHO RA AREA 4C: 6.6 CM2
ECHO RA END SYSTOLIC VOLUME APICAL 4 CHAMBER INDEX BSA: 6 ML/M2
ECHO RA VOLUME: 10 ML
ECHO RV FREE WALL PEAK S': 10.3 CM/S
ECHO RV INTERNAL DIMENSION: 2.6 CM
ECHO RV TAPSE: 1 CM (ref 1.7–?)
ECHO RVOT MEAN GRADIENT: 5 MMHG
ECHO RVOT PEAK GRADIENT: 8 MMHG
ECHO RVOT PEAK VELOCITY: 1.4 M/S
ECHO RVOT VTI: 20.3 CM
ECHO TV REGURGITANT MAX VELOCITY: 2.51 M/S
ECHO TV REGURGITANT PEAK GRADIENT: 25 MMHG
EOSINOPHIL # BLD: 0 K/UL (ref 0–0.6)
EOSINOPHIL NFR BLD: 0.6 %
ERYTHROCYTE [SEDIMENTATION RATE] IN BLOOD BY WESTERGREN METHOD: 81 MM/HR (ref 0–20)
GFR SERPLBLD CREATININE-BSD FMLA CKD-EPI: 71 ML/MIN/{1.73_M2}
GLUCOSE SERPL-MCNC: 80 MG/DL (ref 70–99)
HAPTOGLOB SERPL-MCNC: 270 MG/DL (ref 30–200)
HAV IGM SERPL QL IA: NORMAL
HBV CORE IGM SERPL QL IA: NORMAL
HBV SURFACE AG SERPL QL IA: NORMAL
HCT VFR BLD AUTO: 28.9 % (ref 40.5–52.5)
HCT VFR BLD AUTO: 29.2 % (ref 40.5–52.5)
HCV AB SERPL QL IA: NORMAL
HGB BLD-MCNC: 9.5 G/DL (ref 13.5–17.5)
HGB BLD-MCNC: 9.6 G/DL (ref 13.5–17.5)
LDH SERPL L TO P-CCNC: 518 U/L (ref 100–190)
LEGIONELLA AG UR QL: NORMAL
LYMPHOCYTES # BLD: 0.2 K/UL (ref 1–5.1)
LYMPHOCYTES NFR BLD: 7 %
MAGNESIUM SERPL-MCNC: 2.27 MG/DL (ref 1.8–2.4)
MCH RBC QN AUTO: 26.8 PG (ref 26–34)
MCH RBC QN AUTO: 27.3 PG (ref 26–34)
MCHC RBC AUTO-ENTMCNC: 32.5 G/DL (ref 31–36)
MCHC RBC AUTO-ENTMCNC: 33.2 G/DL (ref 31–36)
MCV RBC AUTO: 82.3 FL (ref 80–100)
MCV RBC AUTO: 82.4 FL (ref 80–100)
MONOCYTES # BLD: 0.5 K/UL (ref 0–1.3)
MONOCYTES NFR BLD: 16.7 %
MRSA DNA SPEC QL NAA+PROBE: NORMAL
NEUTROPHILS # BLD: 2.4 K/UL (ref 1.7–7.7)
NEUTROPHILS NFR BLD: 75.2 %
OVALOCYTES BLD QL SMEAR: ABNORMAL
PATH INTERP BLD-IMP: NO
PATH INTERP BLD-IMP: NO
PATH INTERP BLD-IMP: NORMAL
PLATELET # BLD AUTO: 32 K/UL (ref 135–450)
PLATELET # BLD AUTO: 7 K/UL (ref 135–450)
PLATELET BLD QL SMEAR: ABNORMAL
PLATELET BLD QL SMEAR: ABNORMAL
PMV BLD AUTO: 8.8 FL (ref 5–10.5)
PMV BLD AUTO: 9 FL (ref 5–10.5)
POIKILOCYTOSIS BLD QL SMEAR: ABNORMAL
POTASSIUM SERPL-SCNC: 3.5 MMOL/L (ref 3.5–5.1)
PROCALCITONIN SERPL IA-MCNC: 2.61 NG/ML (ref 0–0.15)
PROT SERPL-MCNC: 7.4 G/DL (ref 6.4–8.2)
RBC # BLD AUTO: 3.52 M/UL (ref 4.2–5.9)
RBC # BLD AUTO: 3.55 M/UL (ref 4.2–5.9)
REPORT: NORMAL
RESP PATH DNA+RNA PNL NPH NAA+NON-PROBE: NORMAL
S PNEUM AG UR QL: NORMAL
SLIDE REVIEW: ABNORMAL
SLIDE REVIEW: ABNORMAL
SODIUM SERPL-SCNC: 130 MMOL/L (ref 136–145)
VANCOMYCIN SERPL-MCNC: 19 UG/ML
WBC # BLD AUTO: 3.2 K/UL (ref 4–11)
WBC # BLD AUTO: 5.1 K/UL (ref 4–11)

## 2024-11-16 PROCEDURE — 87385 HISTOPLASMA CAPSUL AG IA: CPT

## 2024-11-16 PROCEDURE — 2060000000 HC ICU INTERMEDIATE R&B

## 2024-11-16 PROCEDURE — 99223 1ST HOSP IP/OBS HIGH 75: CPT | Performed by: INTERNAL MEDICINE

## 2024-11-16 PROCEDURE — 85025 COMPLETE CBC W/AUTO DIFF WBC: CPT

## 2024-11-16 PROCEDURE — 80074 ACUTE HEPATITIS PANEL: CPT

## 2024-11-16 PROCEDURE — 84145 PROCALCITONIN (PCT): CPT

## 2024-11-16 PROCEDURE — 6360000002 HC RX W HCPCS: Performed by: INTERNAL MEDICINE

## 2024-11-16 PROCEDURE — 6370000000 HC RX 637 (ALT 250 FOR IP): Performed by: INTERNAL MEDICINE

## 2024-11-16 PROCEDURE — 86140 C-REACTIVE PROTEIN: CPT

## 2024-11-16 PROCEDURE — 93306 TTE W/DOPPLER COMPLETE: CPT

## 2024-11-16 PROCEDURE — 86698 HISTOPLASMA ANTIBODY: CPT

## 2024-11-16 PROCEDURE — 83010 ASSAY OF HAPTOGLOBIN QUANT: CPT

## 2024-11-16 PROCEDURE — 86701 HIV-1ANTIBODY: CPT

## 2024-11-16 PROCEDURE — 2580000003 HC RX 258: Performed by: INTERNAL MEDICINE

## 2024-11-16 PROCEDURE — 85652 RBC SED RATE AUTOMATED: CPT

## 2024-11-16 PROCEDURE — 93306 TTE W/DOPPLER COMPLETE: CPT | Performed by: INTERNAL MEDICINE

## 2024-11-16 PROCEDURE — 87449 NOS EACH ORGANISM AG IA: CPT

## 2024-11-16 PROCEDURE — 36430 TRANSFUSION BLD/BLD COMPNT: CPT

## 2024-11-16 PROCEDURE — 80202 ASSAY OF VANCOMYCIN: CPT

## 2024-11-16 PROCEDURE — 86635 COCCIDIOIDES ANTIBODY: CPT

## 2024-11-16 PROCEDURE — 86606 ASPERGILLUS ANTIBODY: CPT

## 2024-11-16 PROCEDURE — 36415 COLL VENOUS BLD VENIPUNCTURE: CPT

## 2024-11-16 PROCEDURE — 86612 BLASTOMYCES ANTIBODY: CPT

## 2024-11-16 PROCEDURE — 83615 LACTATE (LD) (LDH) ENZYME: CPT

## 2024-11-16 PROCEDURE — 80053 COMPREHEN METABOLIC PANEL: CPT

## 2024-11-16 PROCEDURE — 83735 ASSAY OF MAGNESIUM: CPT

## 2024-11-16 PROCEDURE — 85027 COMPLETE CBC AUTOMATED: CPT

## 2024-11-16 PROCEDURE — 87327 CRYPTOCOCCUS NEOFORM AG IA: CPT

## 2024-11-16 PROCEDURE — 86702 HIV-2 ANTIBODY: CPT

## 2024-11-16 PROCEDURE — 94760 N-INVAS EAR/PLS OXIMETRY 1: CPT

## 2024-11-16 PROCEDURE — 0202U NFCT DS 22 TRGT SARS-COV-2: CPT

## 2024-11-16 PROCEDURE — 94640 AIRWAY INHALATION TREATMENT: CPT

## 2024-11-16 PROCEDURE — 87390 HIV-1 AG IA: CPT

## 2024-11-16 RX ORDER — SODIUM CHLORIDE 9 MG/ML
INJECTION, SOLUTION INTRAVENOUS PRN
Status: DISCONTINUED | OUTPATIENT
Start: 2024-11-16 | End: 2024-11-17

## 2024-11-16 RX ORDER — LEVOFLOXACIN 5 MG/ML
750 INJECTION, SOLUTION INTRAVENOUS EVERY 24 HOURS
Status: DISCONTINUED | OUTPATIENT
Start: 2024-11-16 | End: 2024-11-19

## 2024-11-16 RX ADMIN — IPRATROPIUM BROMIDE AND ALBUTEROL SULFATE 1 DOSE: 2.5; .5 SOLUTION RESPIRATORY (INHALATION) at 08:58

## 2024-11-16 RX ADMIN — LEVOFLOXACIN 750 MG: 750 INJECTION, SOLUTION INTRAVENOUS at 17:53

## 2024-11-16 RX ADMIN — IPRATROPIUM BROMIDE AND ALBUTEROL SULFATE 1 DOSE: 2.5; .5 SOLUTION RESPIRATORY (INHALATION) at 12:39

## 2024-11-16 RX ADMIN — ACETAMINOPHEN 325MG 650 MG: 325 TABLET ORAL at 08:01

## 2024-11-16 RX ADMIN — ACETAMINOPHEN 325MG 650 MG: 325 TABLET ORAL at 18:03

## 2024-11-16 RX ADMIN — SODIUM CHLORIDE, PRESERVATIVE FREE 10 ML: 5 INJECTION INTRAVENOUS at 09:59

## 2024-11-16 RX ADMIN — VANCOMYCIN 1250 MG: 1.75 INJECTION, SOLUTION INTRAVENOUS at 06:56

## 2024-11-16 RX ADMIN — SODIUM CHLORIDE, PRESERVATIVE FREE 10 ML: 5 INJECTION INTRAVENOUS at 21:03

## 2024-11-16 RX ADMIN — Medication 1 TABLET: at 09:58

## 2024-11-16 RX ADMIN — MYCOPHENOLATE MOFETIL 1000 MG: 250 CAPSULE ORAL at 09:58

## 2024-11-16 RX ADMIN — IPRATROPIUM BROMIDE AND ALBUTEROL SULFATE 1 DOSE: 2.5; .5 SOLUTION RESPIRATORY (INHALATION) at 21:05

## 2024-11-16 RX ADMIN — TAMSULOSIN HYDROCHLORIDE 0.4 MG: 0.4 CAPSULE ORAL at 21:03

## 2024-11-16 RX ADMIN — CEFEPIME 2000 MG: 2 INJECTION, POWDER, FOR SOLUTION INTRAVENOUS at 21:02

## 2024-11-16 NOTE — CONSULTS
PATIENT IS SEEN AT THE REQUEST OF DR. Pereyra for pneumonia, nodule    HISTORY OF PRESENT ILLNESS:  This is a 54 y.o. male who presented to the ED on 11/15 with a CC of back pain, illness and fever.  Per ED notes he has been feeling ill for past week if not more.  Cough with white sputum and diarrhea.  Admitted for sepsis.  He said he has been fatigued and SOB at times.  Felt ill and very weak.  Complaints are all over the past.  He has no history of lung disease.  Takes chronic immune suppression for SLE      Established Pulmonologist:  None    PAST MEDICAL HISTORY:  Past Medical History:   Diagnosis Date    Lupus (systemic lupus erythematosus) (HCC)     MGUS (monoclonal gammopathy of unknown significance)  HTN        PAST SURGICAL HISTORY:  Heart procedure     FAMILY HISTORY:  Diabetes, RA    SOCIAL HISTORY:   reports that he has never smoked. He has never used smokeless tobacco.    Scheduled Meds:   hydroCHLOROthiazide  25 mg Oral Daily    lisinopril  40 mg Oral Daily    therapeutic multivitamin-minerals  1 tablet Oral Daily    mycophenolate  1,000 mg Oral BID    tamsulosin  0.4 mg Oral Nightly    sodium chloride flush  5-40 mL IntraVENous 2 times per day    cefepime  2,000 mg IntraVENous Q8H    vancomycin  1,250 mg IntraVENous Q18H    vancomycin (VANCOCIN) intermittent dosing (placeholder)   Other RX Placeholder    ipratropium 0.5 mg-albuterol 2.5 mg  1 Dose Inhalation TID RT       Continuous Infusions:   sodium chloride         PRN Meds:  baclofen, sodium chloride flush, sodium chloride, ondansetron **OR** ondansetron, polyethylene glycol, acetaminophen **OR** acetaminophen, perflutren lipid microspheres    ALLERGIES:  Patient is allergic to bactrim [sulfamethoxazole-trimethoprim].    REVIEW OF SYSTEMS:  Constitutional: Fatigue, low energy  HENT: Negative for sore throat  Eyes: Negative for redness   Respiratory: Cough with mucus, SOB  Cardiovascular: Negative for chest pain  Gastrointestinal: Negative for  diastolic function.   The left atrium is mildly dilated.   Normal right ventricular size and function.   Trivial mitral & tricuspid regurgitation.    ABG:  None    Chest CT:  Chest imaging was reviewed by me and showed scattered areas of patchy/ground glass opacities with scattered nodular opacities as well     I reviewed all the above labs and studies pertaining to this visit.      ASSESSMENT/PLAN:  Multifocal Pneumonia   Antibiotics to be managed by ID.  Cefepime.  Vanco   Consider atypical coverage  Hold cellcept while infected    Possible pulmonary nodules vs infectious nodules   Suspect could be all related to pneumonia  Repeat CT in 2 months   Pancytopenia (hold cellcept)  Lupus     DVT prophylaxis  SCD          DO [facility]

## 2024-11-16 NOTE — CONSULTS
Infectious Diseases Inpatient Consult Note      Reason for Consult: Fevers, pneumonia, pancytopenia, lupus on Cellcept    Requesting Physician:       Primary Care Physician:  Linda Carpenter APRN - CNP    History Obtained From:  Epic and pt     CHIEF COMPLAINT:     Chief Complaint   Patient presents with    Back Pain    Illness     Pt has autoimmune disorder and has had fever off and on for the last 5 days.         HISTORY OF PRESENT ILLNESS:  54 y.o. male with a significant history for SLE positive for GOLD and hypocomplementemia pancytopenia followed at rheumatology at [facility] he is currently on mycophenolate is taking 1000 mg daily per recent rheumatology note there was a discussion about changing the medication but patient was not interested patient now admitted to hospital secondary to ongoing fever on and off for the past 5 days not feeling well with cough and some sob not much sputum and labs indicate potassium 3.2 procalcitonin 2.61 ALT 58  WBC 3.2 hemoglobin 9.5 platelets at 735% bandemia urinalysis negative, CT chest with groundglass opacities and subpleural reticular markings noted possible likely early fibrotic changes CT head negative we are consulted for antibiotic recommendations        Past Medical History:    Past Medical History:   Diagnosis Date    Lupus (systemic lupus erythematosus) (HCC)     MGUS (monoclonal gammopathy of unknown significance)        HTN (hypertension)       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 No pertinent surgical history.    Current Medications:    Outpatient Medications Marked as Taking for the 11/15/24 encounter (Hospital Encounter)   Medication Sig Dispense Refill    baclofen (LIORESAL) 10 MG tablet Take 1 tablet

## 2024-11-16 NOTE — PLAN OF CARE
Problem: Discharge Planning  Goal: Discharge to home or other facility with appropriate resources  11/16/2024 1241 by Fernanda Medina RN  Outcome: Progressing     Problem: Pain  Goal: Verbalizes/displays adequate comfort level or baseline comfort level  11/16/2024 1241 by Fernanda Medina RN  Outcome: Progressing     Problem: ABCDS Injury Assessment  Goal: Absence of physical injury  11/16/2024 1241 by Fernanda Medina, RN  Outcome: Progressing     Problem: Safety - Adult  Goal: Free from fall injury  11/16/2024 1241 by Fernanda Medina, RN  Outcome: Progressing

## 2024-11-16 NOTE — RT PROTOCOL NOTE
RT Inhaler-Nebulizer Bronchodilator Protocol Note    There is a bronchodilator order in the chart from a provider indicating to follow the RT Bronchodilator Protocol and there is an “Initiate RT Inhaler-Nebulizer Bronchodilator Protocol” order as well (see protocol at bottom of note).    CXR Findings:  XR CHEST PORTABLE    Result Date: 11/15/2024  Mild ground-glass opacification in the left lower lung zone.  Atelectasis or asymmetric edema may be considered.  Developing pneumonitis cannot be excluded.       The findings from the last RT Protocol Assessment were as follows:   History Pulmonary Disease: None or smoker <15 pack years  Respiratory Pattern: Regular pattern and RR 12-20 bpm  Breath Sounds: Slightly diminished and/or crackles  Cough: Strong, spontaneous, non-productive  Indication for Bronchodilator Therapy: Decreased or absent breath sounds  Bronchodilator Assessment Score: 2    Aerosolized bronchodilator medication orders have been revised according to the RT Inhaler-Nebulizer Bronchodilator Protocol below.    Respiratory Therapist to perform RT Therapy Protocol Assessment initially then follow the protocol.  Repeat RT Therapy Protocol Assessment PRN for score 0-3 or on second treatment, BID, and PRN for scores above 3.    No Indications - adjust the frequency to every 6 hours PRN wheezing or bronchospasm, if no treatments needed after 48 hours then discontinue using Per Protocol order mode.     If indication present, adjust the RT bronchodilator orders based on the Bronchodilator Assessment Score as indicated below.  Use Inhaler orders unless patient has one or more of the following: on home nebulizer, not able to hold breath for 10 seconds, is not alert and oriented, cannot activate and use MDI correctly, or respiratory rate 25 breaths per minute or more, then use the equivalent nebulizer order(s) with same Frequency and PRN reasons based on the score.  If a patient is on this medication at home then  do not decrease Frequency below that used at home.    0-3 - enter or revise RT bronchodilator order(s) to equivalent RT Bronchodilator order with Frequency of every 4 hours PRN for wheezing or increased work of breathing using Per Protocol order mode.        4-6 - enter or revise RT Bronchodilator order(s) to two equivalent RT bronchodilator orders with one order with BID Frequency and one order with Frequency of every 4 hours PRN wheezing or increased work of breathing using Per Protocol order mode.        7-10 - enter or revise RT Bronchodilator order(s) to two equivalent RT bronchodilator orders with one order with TID Frequency and one order with Frequency of every 4 hours PRN wheezing or increased work of breathing using Per Protocol order mode.       11-13 - enter or revise RT Bronchodilator order(s) to one equivalent RT bronchodilator order with QID Frequency and an Albuterol order with Frequency of every 4 hours PRN wheezing or increased work of breathing using Per Protocol order mode.      Greater than 13 - enter or revise RT Bronchodilator order(s) to one equivalent RT bronchodilator order with every 4 hours Frequency and an Albuterol order with Frequency of every 2 hours PRN wheezing or increased work of breathing using Per Protocol order mode.     RT to enter RT Home Evaluation for COPD & MDI Assessment order using Per Protocol order mode.    Electronically signed by Rin Daniels RCP on 11/15/2024 at 7:38 PM

## 2024-11-16 NOTE — PROGRESS NOTES
Clinical Pharmacy Note  Vancomycin Consult    Dusty Jauregui is a 54 y.o. male ordered vancomycin for CAP; consult received from Dr. Pereyra to manage therapy. Also receiving cefepime.    Allergies:  Bactrim [sulfamethoxazole-trimethoprim]     Temp max:  Temp (24hrs), Av.8 °F (37.1 °C), Min:97.6 °F (36.4 °C), Max:101 °F (38.3 °C)      Recent Labs     11/15/24  1143 24  0515   WBC 3.5* 3.2*       Recent Labs     11/15/24  1103 24  0515   BUN 43* 35*   CREATININE 1.3 1.2         Intake/Output Summary (Last 24 hours) at 2024 1447  Last data filed at 2024 1420  Gross per 24 hour   Intake 1270.5 ml   Output 1000 ml   Net 270.5 ml       Culture Results:  MRSA nasal probe negative    Ht Readings from Last 1 Encounters:   24 1.753 m (5' 9\")        Wt Readings from Last 1 Encounters:   24 62.6 kg (138 lb)         Estimated Creatinine Clearance: 61 mL/min (based on SCr of 1.2 mg/dL).    Assessment:  Day # 2 of vancomycin.  ID is consulted.  Current regimen: 1250 mg every 18 hours  Vancomycin level: 19 mg/L  Predicted AUC: 530    Plan:  Continue current regimen.      Thank you for the consult.   Yuki Kohli [facility], PharmD, 2024 2:49 PM

## 2024-11-16 NOTE — PROGRESS NOTES
4 Eyes Skin Assessment     NAME:  Dusty Jauregui  YOB: 1970  MEDICAL RECORD NUMBER:  [id number]    The patient is being assessed for  Admission    I agree that at least one RN has performed a thorough Head to Toe Skin Assessment on the patient. ALL assessment sites listed below have been assessed.      Areas assessed by both nurses:    Head, Face, Ears, Shoulders, Back, Chest, Arms, Elbows, Hands, Sacrum. Buttock, Coccyx, Ischium, Legs. Feet and Heels, and Under Medical Devices         Does the Patient have a Wound? No noted wound(s)       Gilberto Prevention initiated by RN: No  Wound Care Orders initiated by RN: No    Pressure Injury (Stage 3,4, Unstageable, DTI, NWPT, and Complex wounds) if present, place Wound referral order by RN under : No    New Ostomies, if present place, Ostomy referral order under : No     Nurse 1 eSignature: Electronically signed by Chan Chow RN on 11/16/24 at 5:01 AM EST    **SHARE this note so that the co-signing nurse can place an eSignature**    Nurse 2 eSignature: Electronically signed by Luisa Smith RN on 11/16/24 at 6:10 AM EST

## 2024-11-16 NOTE — CONSULTS
[facility]   CONSULT NOTE    11/16/2024 2:15 PM    Patient Information: ALEYDA MILLER   Date of Admit:  11/15/2024  Primary Care Physician:  Linda Carpenter APRN - CNP  Requesting Physician:  Willy Bentley MD    Reason for consult:    Hematology/oncology consulted for pancytopenia    Chief complaint:    Hematology/oncology consulted for pancytopenia     History of Present Illness:    54-year-old male with a past medical history of SLE who presents to the hospital for weakness shortness of breath and cough progressively getting worse over the past several days along with chills and bodyaches.  Hematology/oncology was consulted for pancytopenia     Past Medical History:     has a past medical history of Lupus (systemic lupus erythematosus) (HCC) and MGUS (monoclonal gammopathy of unknown significance).         Past Surgical History:    History reviewed. No pertinent surgical history.         Current Medications:     hydroCHLOROthiazide  25 mg Oral Daily    lisinopril  40 mg Oral Daily    therapeutic multivitamin-minerals  1 tablet Oral Daily    [Held by provider] mycophenolate  1,000 mg Oral BID    tamsulosin  0.4 mg Oral Nightly    sodium chloride flush  5-40 mL IntraVENous 2 times per day    cefepime  2,000 mg IntraVENous Q8H    vancomycin  1,250 mg IntraVENous Q18H    vancomycin (VANCOCIN) intermittent dosing (placeholder)   Other RX Placeholder    ipratropium 0.5 mg-albuterol 2.5 mg  1 Dose Inhalation TID RT           Allergies:    Allergies   Allergen Reactions    Bactrim [Sulfamethoxazole-Trimethoprim]      Lip swelling        Social History:    reports that he has never smoked. He has never used smokeless tobacco. He reports current alcohol use. He reports that he does not currently use drugs.         Family History:     family history is not on file.         ROS:  14 point review of systems performed negative except for as documented in the HPI        PHYSICAL EXAM:        Vitals:  the past by hematology at [facility] and had a bone marrow biopsy which was unrevealing and it was felt that his baseline pancytopenia is secondary to lupus  -His baseline platelet count is around 50.  I suspect that his acute on chronic thrombocytopenia is secondary to infection/inflammation  -Today platelet count of 7 we will give him 1 unit of platelets  -Peripheral blood smear was reviewed by me.  Peripheral smear showed true thrombocytopenia without any platelet clumping, NO schistocytes or blasts were noted thereby microangiopathic process like TTP or HUS is highly unlikely  -Transfuse platelets if below 10 or below 50 with active bleeding  -B12, folate, copper, zinc levels to see if there is any nutritional deficiency contributing to cytopenias  -Iron studies  -LDH and haptoglobin to rule out hemolysis  -Unclear if patient is also having a lupus flare given some confusion may have component of lupus cerebritis.  If clinically deteriorates may need transfer to  for stat rheumatological evaluation    Candice Wilkins MD  [facility]

## 2024-11-16 NOTE — CONSENT
Informed Consent for Blood Component Transfusion Note    I have discussed with the patient the rationale for blood component transfusion; its benefits in treating or preventing fatigue, organ damage, or death; and its risk which includes mild transfusion reactions, rare risk of blood borne infection, or more serious but rare reactions. I have discussed the alternatives to transfusion, including the risk and consequences of not receiving transfusion. The patient had an opportunity to ask questions and had agreed to proceed with transfusion of blood components.    Electronically signed by Candice Wilkins MD on 11/16/24 at 10:18 AM EST

## 2024-11-16 NOTE — PROGRESS NOTES
Hospitalist Progress Note  11/16/2024 8:21 AM  Subjective:   Admit Date: 11/15/2024  PCP: Linda Carpenter APRN - CNP Status: Inpatient   Interval History: Hospital Day: 2, admitted with sepsis secondary to multifocal pneumonia, likely gram-negative.  Patient is immunocompromised with monoclonal gammopathy and lupus on mycophenolate (last took 5 days prior to admission).  Antibiotic therapy with cefepime and vancomycin.  Pancytopenia presumed secondary to lupus.  Patient had syncopal episode in ED CT scan negative for intracranial bleed.   BP meds held for hypotension.     Diet: regular  Right antecubital peripheral IV (11/15, day #2)    Medications:     HCTZ  25 mg Oral Daily   lisinopril  40 mg Oral Daily   mycophenolate  1,000 mg Oral BID   tamsulosin  0.4 mg Oral Nightly   cefepime  2,000 mg IntraVENous Q8H (11/15, day #2)   vancomycin  1,250 mg IntraVENous Q18H (11/15, day #2)   ipratropium-albuterol   1 Dose Inhalation TID RT       Labs:       11/15/24  1103 11/16/24  0515   WBC 3.5* 3.2*   HGB 10.0* 9.5*   PLT 11* 7*   MCV 82.8 82.3        * 130*   K 3.2* 3.5   CL 95* 99   CO2 22 20*   BUN 43* 35*   CREATININE 1.3 1.2   GLUCOSE 97 80        MG 2.21 2.27   CALCIUM 8.5 8.3   ALBUMIN 4.1 3.4   * 75*   ALT 58* 45*   ALKPHOS 61 56     Procalcitonin (1/16) 2.61 ng/mL  Lactate (11/15) 1.2 / 1.0 mmol/L    Nasal MRSA DNA (11/15) negative  Molecular pneumonia PCR panel (11/15) not detected  Influenza A/B (11/15) not detected  SARS-CoV-2 NAAT (11/15) not detected     Blood culture x 2 (11/15) pending    Noncontrast CT chest (11/15) Ground-glass opacities and subpleural reticular markings are noted at the lung bases, most prominently seen in the left lower lobe, also present to  lesser degree in the subpleural bilateral upper lobes.  Findings are most  suggestive of an infectious/inflammatory process, less likely early fibrotic  change given lack of significant bronchiectasis.  A 1.9 cm nodular right  lower lobe opacity demonstrates tiny internal  calcifications, possibly related to prior granulomatous disease.  Recommend follow-up CT in 3 months.  A borderline prominent left lower paratracheal lymph node measures 1.0 cm and is favored reactive.     CT facial bones (11/15) No acute facial bone fracture. Small hematomas overlie the right frontal bone including the right frontal sinus without evidence of underlying fracture.     CT cervical spine (11/15) No acute findings in the cervical spine. Moderate degenerative disc disease at the C3-C4 level.    Noncontrast CT head (11/15) No evidence of acute intracranial abnormality.     Portable CXR (11/15) Mild ground-glass opacification in the left lower lung zone.  Atelectasis or  asymmetric edema may be considered.  Developing pneumonitis cannot be  excluded.    Transthoracic echo (11/16) pending    Objective:   Vitals:  /64   Pulse 115   Temp 101 °F (oral)   Resp 18   Ht 5' 9\"  Wt 63 kg  SpO2 100% on RA  BMI 20.51 kg/m²   General appearance: alert and cooperative with exam  Lungs: diffuse rhonchi, reasonable overall air movement  Heart: regular rate and rhythm, S1, S2 normal, no murmur, click, rub or gallop  Abdomen: soft, non-tender; bowel sounds normal; no masses,  no organomegaly  Extremities: extremities normal, atraumatic, no cyanosis or edema  Neurologic: No obvious focal neurologic deficits.    Assessment and Plan:   Sepsis secondary to multifocal pneumonia, likely gram-negative.  Antibiotic therapy with cefepime and vancomycin.    Patient is immunocompromised with monoclonal gammopathy and lupus on mycophenolate (last took 5 days prior to admission).  Pancytopenia presumed secondary to lupus.  Followed by  rheumatology.   Thrombocytopenia.  No bleeding.  Hem/Onc evaluation.    Normocytic anemia:  Hemoglobin stable.   Orthostatic syncopal episode in ED CT scan negative for intracranial bleed.   Telemetry.  Presumed vasovagal.  Echocardiogram

## 2024-11-16 NOTE — PROGRESS NOTES
Spiritual Health History and Assessment/Progress Note  [facility]    Spiritual/Emotional Needs,  ,  ,      Name: Dusty Jauregui MRN: [id number]    Age: 54 y.o.     Sex: male   Language: English   Latter day: None   Sepsis (HCC)     Date: 11/16/2024            Total Time Calculated: 68 min              Spiritual Assessment began in [facility]        Referral/Consult From: Nurse   Encounter Overview/Reason: Spiritual/Emotional Needs  Service Provided For: Patient    Rosa, Belief, Meaning:   Patient is connected with a rosa tradition or spiritual practice  Family/Friends No family/friends present      Importance and Influence:  Patient has spiritual/personal beliefs that influence decisions regarding their health  Family/Friends No family/friends present    Community:  Patient is connected with a spiritual community and feels well-supported. Support system includes: Parent/s, Children, Rosa Community, Friends, and Extended family  Family/Friends No family/friends present    Assessment and Plan of Care:     Patient Interventions include: Facilitated expression of thoughts and feelings, Explored spiritual coping/struggle/distress, Engaged in theological reflection, Affirmed coping skills/support systems, and Other: expressed gratitude towards God and others, prayer  Family/Friends Interventions include: No family/friends present    Patient Plan of Care: Other: patient request follow-up visit on Monday   Family/Friends Plan of Care: No family/friends present    Electronically signed by Chaplain Gilberto on 11/16/2024 at 4:05 PM

## 2024-11-17 LAB
ACANTHOCYTES BLD QL SMEAR: ABNORMAL
ANION GAP SERPL CALCULATED.3IONS-SCNC: 12 MMOL/L (ref 3–16)
ANISOCYTOSIS BLD QL SMEAR: ABNORMAL
BASOPHILS # BLD: 0 K/UL (ref 0–0.2)
BASOPHILS NFR BLD: 0 %
BUN SERPL-MCNC: 21 MG/DL (ref 7–20)
CALCIUM SERPL-MCNC: 8.5 MG/DL (ref 8.3–10.6)
CHLORIDE SERPL-SCNC: 103 MMOL/L (ref 99–110)
CO2 SERPL-SCNC: 20 MMOL/L (ref 21–32)
CREAT SERPL-MCNC: 0.9 MG/DL (ref 0.9–1.3)
DEPRECATED RDW RBC AUTO: 16.3 % (ref 12.4–15.4)
EOSINOPHIL # BLD: 0 K/UL (ref 0–0.6)
EOSINOPHIL NFR BLD: 0 %
FERRITIN SERPL IA-MCNC: 1226 NG/ML (ref 30–400)
FOLATE SERPL-MCNC: 11.9 NG/ML (ref 4.78–24.2)
GFR SERPLBLD CREATININE-BSD FMLA CKD-EPI: >90 ML/MIN/{1.73_M2}
GLUCOSE SERPL-MCNC: 97 MG/DL (ref 70–99)
HAPTOGLOB SERPL-MCNC: 263 MG/DL (ref 30–200)
HCT VFR BLD AUTO: 28.6 % (ref 40.5–52.5)
HGB BLD-MCNC: 9.3 G/DL (ref 13.5–17.5)
HIV 1+2 AB+HIV1 P24 AG SERPL QL IA: NORMAL
HIV 2 AB SERPL QL IA: NORMAL
HIV1 AB SERPL QL IA: NORMAL
HIV1 P24 AG SERPL QL IA: NORMAL
IRON SATN MFR SERPL: 34 % (ref 20–50)
IRON SERPL-MCNC: 64 UG/DL (ref 59–158)
LDH SERPL L TO P-CCNC: 456 U/L (ref 100–190)
LYMPHOCYTES # BLD: 0.7 K/UL (ref 1–5.1)
LYMPHOCYTES NFR BLD: 16 %
MCH RBC QN AUTO: 26.8 PG (ref 26–34)
MCHC RBC AUTO-ENTMCNC: 32.6 G/DL (ref 31–36)
MCV RBC AUTO: 82.2 FL (ref 80–100)
MONOCYTES # BLD: 1.1 K/UL (ref 0–1.3)
MONOCYTES NFR BLD: 26 %
NEUTROPHILS # BLD: 2.4 K/UL (ref 1.7–7.7)
NEUTROPHILS NFR BLD: 58 %
OVALOCYTES BLD QL SMEAR: ABNORMAL
PATH INTERP BLD-IMP: NO
PLATELET # BLD AUTO: 26 K/UL (ref 135–450)
PLATELET BLD QL SMEAR: ABNORMAL
PMV BLD AUTO: 9.3 FL (ref 5–10.5)
POIKILOCYTOSIS BLD QL SMEAR: ABNORMAL
POTASSIUM SERPL-SCNC: 3.2 MMOL/L (ref 3.5–5.1)
PROCALCITONIN SERPL IA-MCNC: 1.4 NG/ML (ref 0–0.15)
RBC # BLD AUTO: 3.48 M/UL (ref 4.2–5.9)
SLIDE REVIEW: ABNORMAL
SODIUM SERPL-SCNC: 135 MMOL/L (ref 136–145)
TIBC SERPL-MCNC: 191 UG/DL (ref 260–445)
VIT B12 SERPL-MCNC: 565 PG/ML (ref 211–911)
WBC # BLD AUTO: 4.1 K/UL (ref 4–11)

## 2024-11-17 PROCEDURE — 97530 THERAPEUTIC ACTIVITIES: CPT

## 2024-11-17 PROCEDURE — 2060000000 HC ICU INTERMEDIATE R&B

## 2024-11-17 PROCEDURE — 80048 BASIC METABOLIC PNL TOTAL CA: CPT

## 2024-11-17 PROCEDURE — 83540 ASSAY OF IRON: CPT

## 2024-11-17 PROCEDURE — 94760 N-INVAS EAR/PLS OXIMETRY 1: CPT

## 2024-11-17 PROCEDURE — 97165 OT EVAL LOW COMPLEX 30 MIN: CPT

## 2024-11-17 PROCEDURE — 6370000000 HC RX 637 (ALT 250 FOR IP): Performed by: INTERNAL MEDICINE

## 2024-11-17 PROCEDURE — 82746 ASSAY OF FOLIC ACID SERUM: CPT

## 2024-11-17 PROCEDURE — 87305 ASPERGILLUS AG IA: CPT

## 2024-11-17 PROCEDURE — 85025 COMPLETE CBC W/AUTO DIFF WBC: CPT

## 2024-11-17 PROCEDURE — 84145 PROCALCITONIN (PCT): CPT

## 2024-11-17 PROCEDURE — 82525 ASSAY OF COPPER: CPT

## 2024-11-17 PROCEDURE — 97161 PT EVAL LOW COMPLEX 20 MIN: CPT

## 2024-11-17 PROCEDURE — 99233 SBSQ HOSP IP/OBS HIGH 50: CPT | Performed by: INTERNAL MEDICINE

## 2024-11-17 PROCEDURE — 2580000003 HC RX 258: Performed by: INTERNAL MEDICINE

## 2024-11-17 PROCEDURE — 94640 AIRWAY INHALATION TREATMENT: CPT

## 2024-11-17 PROCEDURE — 82607 VITAMIN B-12: CPT

## 2024-11-17 PROCEDURE — 83010 ASSAY OF HAPTOGLOBIN QUANT: CPT

## 2024-11-17 PROCEDURE — 84630 ASSAY OF ZINC: CPT

## 2024-11-17 PROCEDURE — 36415 COLL VENOUS BLD VENIPUNCTURE: CPT

## 2024-11-17 PROCEDURE — 83550 IRON BINDING TEST: CPT

## 2024-11-17 PROCEDURE — 6360000002 HC RX W HCPCS: Performed by: INTERNAL MEDICINE

## 2024-11-17 PROCEDURE — 82728 ASSAY OF FERRITIN: CPT

## 2024-11-17 PROCEDURE — 83615 LACTATE (LD) (LDH) ENZYME: CPT

## 2024-11-17 RX ORDER — POTASSIUM CHLORIDE 1500 MG/1
40 TABLET, EXTENDED RELEASE ORAL ONCE
Status: COMPLETED | OUTPATIENT
Start: 2024-11-17 | End: 2024-11-17

## 2024-11-17 RX ORDER — POTASSIUM CHLORIDE 7.45 MG/ML
10 INJECTION INTRAVENOUS
Status: DISCONTINUED | OUTPATIENT
Start: 2024-11-17 | End: 2024-11-17

## 2024-11-17 RX ADMIN — CEFEPIME 2000 MG: 2 INJECTION, POWDER, FOR SOLUTION INTRAVENOUS at 12:43

## 2024-11-17 RX ADMIN — LEVOFLOXACIN 750 MG: 750 INJECTION, SOLUTION INTRAVENOUS at 17:47

## 2024-11-17 RX ADMIN — IPRATROPIUM BROMIDE AND ALBUTEROL SULFATE 1 DOSE: 2.5; .5 SOLUTION RESPIRATORY (INHALATION) at 08:58

## 2024-11-17 RX ADMIN — CEFEPIME 2000 MG: 2 INJECTION, POWDER, FOR SOLUTION INTRAVENOUS at 04:38

## 2024-11-17 RX ADMIN — SODIUM CHLORIDE, PRESERVATIVE FREE 10 ML: 5 INJECTION INTRAVENOUS at 10:13

## 2024-11-17 RX ADMIN — VANCOMYCIN 1250 MG: 1.75 INJECTION, SOLUTION INTRAVENOUS at 01:51

## 2024-11-17 RX ADMIN — POTASSIUM CHLORIDE 40 MEQ: 1500 TABLET, EXTENDED RELEASE ORAL at 10:13

## 2024-11-17 RX ADMIN — HYDROCHLOROTHIAZIDE 25 MG: 25 TABLET ORAL at 10:13

## 2024-11-17 RX ADMIN — LISINOPRIL 40 MG: 40 TABLET ORAL at 10:13

## 2024-11-17 RX ADMIN — TAMSULOSIN HYDROCHLORIDE 0.4 MG: 0.4 CAPSULE ORAL at 22:10

## 2024-11-17 RX ADMIN — Medication 1 TABLET: at 10:13

## 2024-11-17 RX ADMIN — SODIUM CHLORIDE, PRESERVATIVE FREE 10 ML: 5 INJECTION INTRAVENOUS at 22:10

## 2024-11-17 RX ADMIN — IPRATROPIUM BROMIDE AND ALBUTEROL SULFATE 1 DOSE: 2.5; .5 SOLUTION RESPIRATORY (INHALATION) at 12:01

## 2024-11-17 RX ADMIN — IPRATROPIUM BROMIDE AND ALBUTEROL SULFATE 1 DOSE: 2.5; .5 SOLUTION RESPIRATORY (INHALATION) at 19:21

## 2024-11-17 NOTE — PROGRESS NOTES
Occupational Therapy  Facility/Department: [address] PROGRESSIVE CARE  Occupational Therapy Initial Assessment and Tentative D/C      Name: Dusty Jauregui  : 1970  MRN: [id number]  Date of Service: 2024    Discharge Recommendations: Dusty Jauregui scored a 19/24 on the AM-PAC ADL Inpatient form. Current research shows that an AM-PAC score of 18 or greater is typically associated with a discharge to the patient's home setting.     If patient discharges prior to next session this note will serve as a discharge summary.  Please see below for the latest assessment towards goals.     Home with assist PRN  OT Equipment Recommendations  Equipment Needed: Yes  Mobility Devices: ADL Assistive Devices  ADL Assistive Devices: Shower Chair with back       Patient Diagnosis(es): The primary encounter diagnosis was Sepsis, due to unspecified organism, unspecified whether acute organ dysfunction present ([facility]). Diagnoses of Pneumonia of left lower lobe due to infectious organism, Syncope and collapse, Injury of head, initial encounter, Thrombocytopenia ([facility]), Hypokalemia, BRET (acute kidney injury) ([facility]), and Pulmonary nodule were also pertinent to this visit.  Past Medical History:  has a past medical history of Lupus (systemic lupus erythematosus) ([facility]) and MGUS (monoclonal gammopathy of unknown significance).  Past Surgical History:  has no past surgical history on file.           Assessment  Performance deficits / Impairments: Decreased functional mobility ;Decreased balance;Decreased ADL status;Decreased high-level IADLs;Decreased strength;Decreased endurance  Assessment: PTA pt from home where pt was Ind with mobility and ADLs. Pt currently functioning slightly below baseline completing mobility and transfers with SBA. Pt able to ambulate short household distances in room with no overt LOB. No reports of light headedness/dizziness. Orthostatic BP taken, refer to restrictions. Anticipate pt needing up SBA for

## 2024-11-17 NOTE — PROGRESS NOTES
Pulmonary Progress Note    CC:  Follow up pneumonia    Subjective:  Room air  Afebrile  Procal improved   Feels better  Less Sob    ROS  Less Sob  Stronger       Intake/Output Summary (Last 24 hours) at 11/17/2024 0948  Last data filed at 11/16/2024 1420  Gross per 24 hour   Intake 270.5 ml   Output 300 ml   Net -29.5 ml         PHYSICAL EXAM:  Blood pressure (!) 144/88, pulse 99, temperature 98.5 °F (36.9 °C), resp. rate 16, height 1.753 m (5' 9\"), weight 62.6 kg (138 lb), SpO2 95%.'  Gen: No distress. Thin  Eyes: PERRL. No sclera icterus. No conjunctival injection.   ENT: No discharge. Pharynx clear. External appearance of ears and nose normal.  Neck: Trachea midline. No obvious mass.    Resp: No crackles. No wheezes. No rhonchi. No dullness on percussion.  CV: Tachy. No murmur or rub.    GI: Non-tender. Non-distended. No hernia.   Skin: Warm, dry, normal texture and turgor. No nodule on exposed extremities.   Lymph: No cervical LAD. No supraclavicular LAD.   M/S: No cyanosis. No clubbing. No joint deformity.    Neuro: Moves all four extremities. CN 2-12 tested, no defect noted.  Ext:   no edema    Medications:    Scheduled Meds:   potassium chloride  10 mEq IntraVENous Q1H    levofloxacin  750 mg IntraVENous Q24H    hydroCHLOROthiazide  25 mg Oral Daily    lisinopril  40 mg Oral Daily    therapeutic multivitamin-minerals  1 tablet Oral Daily    [Held by provider] mycophenolate  1,000 mg Oral BID    tamsulosin  0.4 mg Oral Nightly    sodium chloride flush  5-40 mL IntraVENous 2 times per day    cefepime  2,000 mg IntraVENous Q8H    vancomycin  1,250 mg IntraVENous Q18H    vancomycin (VANCOCIN) intermittent dosing (placeholder)   Other RX Placeholder    ipratropium 0.5 mg-albuterol 2.5 mg  1 Dose Inhalation TID RT       Continuous Infusions:   sodium chloride      sodium chloride         PRN Meds:  sodium chloride, baclofen, sodium chloride flush, sodium chloride, ondansetron **OR** ondansetron, polyethylene

## 2024-11-17 NOTE — PROGRESS NOTES
Physical Therapy  Facility/Department: [address] PROGRESSIVE CARE  Physical Therapy Initial Assessment    Name: Dusty Jauregui  : 1970  MRN: [id number]  Date of Service: 2024    Discharge Recommendations:  Continue to assess pending progress, Home with assist PRN   PT Equipment Recommendations  Other: Will monitor for any equipt needs.  Dusty Jauregui scored a 19/24 on the AM-PAC short mobility form.  At this time, no further PT is recommended upon discharge.  Assessment  Body Structures, Functions, Activity Limitations Requiring Skilled Therapeutic Intervention: Decreased functional mobility ;Decreased balance  Assessment: 53 y/o male admit 11/15/204 with Syncope/Collapse, Acute Respiratory, Sepsis. CT Head/C-Spine : negative. PMH as noted including Lupus, MGUS.  PTA pt living alone in apt setting with few steps to enter; independent daily care and functional mobility.  Pt currently alvin oob, transfers/amb functional distances SBA; denies any dizziness/lighthead (Orthostatics negative).  At this time, anticipate d/c home.  Will cont to monitor pt's progress.  Therapy Prognosis: Fair  Decision Making: Low Complexity  History: 53 y/o male admit 11/15/204 with Syncope/Collapse, Acute Respiratory, Sepsis. CT Head/C-Spine : negative. PMH as noted including Lupus, MGUS.  Exam: See above.  Clinical Presentation: See above.  Barriers to Learning: None.  Requires PT Follow-Up: Yes  Activity Tolerance  Activity Tolerance: Patient tolerated treatment well    Plan  Physical Therapy Plan  General Plan: 3-5 times per week  Current Treatment Recommendations: Strengthening, Therapeutic activities, Balance training, Functional mobility training, Transfer training, Gait training, Stair training, Safety education & training, Patient/Caregiver education & training  Safety Devices  Type of Devices: Call light within reach, Chair alarm in place, Left in chair, Nurse notified  Restraints  Restraints Initially in Place:

## 2024-11-17 NOTE — PROGRESS NOTES
Hospitalist Progress Note  11/17/2024 3:21 PM  Subjective:   Admit Date: 11/15/2024  PCP: Linda Carpenter APRN - CNP Status: Inpatient   Interval History: Hospital Day: 3, levofloxacin added to cefepime and vancomycin (11/16) by infectious disease.   Fungal antibody, urine histoplasma antigen, serum histoplasma antigen, serum cryptococcal antigen pending.  Transfused 1 unit platelets with increased platelet count.  Mycophenolate held.     History of present illness: Admitted with sepsis secondary to multifocal pneumonia, likely gram-negative.  Patient is immunocompromised with monoclonal gammopathy and GOLD positive SLE on mycophenolate (last took 5 days prior to admission).  Antibiotic therapy with cefepime and vancomycin.  CT chest with groundglass opacities and subpleural reticular markings noted possible likely early fibrotic changes.  Pancytopenia presumed secondary to lupus.  Patient had syncopal episode in ED CT scan negative for intracranial bleed.   BP meds held for hypotension.      Diet: regular  Right antecubital peripheral IV (11/15, day #3)    Medications:     levofloxacin  750 mg IntraVENous Q24H (11/16, day #2)   HCTZ  25 mg Oral Daily   lisinopril  40 mg Oral Daily   mycophenolate  1,000 mg Oral BID [held]   tamsulosin  0.4 mg Oral Nightly   cefepime  2,000 mg IntraVENous Q8H (11/15, day #3)   vancomycin  1,250 mg IntraVENous Q18H (11/15, day #3)   ipratropium-albuterol  1 Dose Inhalation TID RT       Labs:       11/15/24  1103 11/16/24  0515 11/16/24  1612 11/17/24  0538   WBC  --  3.2* 5.1 4.1   HGB  --  9.5* 9.6* 9.3*   PLT  --  7* 32* 26*   MCV  --  82.3 82.4 82.2   * 130*  --  135*   K 3.2* 3.5  --  3.2*   CL 95* 99  --  103   CO2 22 20*  --  20*   BUN 43* 35*  --  21*   CREATININE 1.3 1.2  --  0.9   GLUCOSE 97 80  --  97   MG 2.21 2.27  --   --    CALCIUM 8.5 8.3  --  8.5   ALBUMIN 4.1 3.4  --   --    * 75*  --   --    ALT 58* 45*  --   --    ALKPHOS 61 56  --   --   Resp 22   Ht 5' 9\"  Wt 62.6 kg  SpO2 99% on RA  BMI 20.38 kg/m²   General appearance: alert and cooperative with exam  Lungs: diffuse rhonchi, reasonable overall air movement  Heart: regular rate and rhythm, S1, S2 normal, no murmur, click, rub or gallop  Abdomen: soft, non-tender; bowel sounds normal; no masses,  no organomegaly  Extremities: extremities normal, atraumatic, no cyanosis or edema  Neurologic: No obvious focal neurologic deficits.    Assessment and Plan:   Sepsis secondary to multifocal pneumonia, likely gram-negative.  Antibiotic therapy with cefepime and vancomycin.  Levofloxacin added to cefepime and vancomycin (11/16) by infectious disease.   Fungal antibody, urine histoplasma antigen, serum histoplasma antigen, serum cryptococcal antigen pending.  Plan to add IV voriconazole if more continued febrile.   Patient is immunocompromised with monoclonal gammopathy and lupus on mycophenolate (last took 5 days prior to admission).  Pancytopenia presumed secondary to lupus.  Followed by  rheumatology.   Thrombocytopenia.  No bleeding.  Hem/Onc evaluation.    Normocytic anemia:  Hemoglobin stable.   Orthostatic syncopal episode in ED CT scan negative for intracranial bleed.   Telemetry.  Presumed vasovagal.  Echocardiogram pending.  No murmur on exam.    A 1.9 cm nodular right lower lobe opacity demonstrates tiny internal  calcifications, possibly related to prior granulomatous disease.  Recommend follow-up CT in 3 months.  Pulmonary following.   Hypokalemia:  Potassium chloride oral replacement to goal K+ > 3.5 mmol/L       Advance Directive: Full Code  DVT prophylaxis with SCDs (thrombocytopenia)  Discharge planning: > 48 hours  May need transfer to  if concern for lupus cerebritis.   AM-PAC 19/24 by PT/OT      BAR ARCHULETA MD  [facility] Hospitalist

## 2024-11-17 NOTE — PLAN OF CARE
Problem: Discharge Planning  Goal: Discharge to home or other facility with appropriate resources  11/17/2024 1343 by Fernanda Medina RN  Outcome: Progressing     Problem: Pain  Goal: Verbalizes/displays adequate comfort level or baseline comfort level  11/17/2024 1343 by Fernanda Medina RN  Outcome: Progressing     Problem: ABCDS Injury Assessment  Goal: Absence of physical injury  11/17/2024 1343 by Fernanda Medina RN  Outcome: Progressing     Problem: Safety - Adult  Goal: Free from fall injury  11/17/2024 1343 by Fernanda Medina, RN  Outcome: Progressing

## 2024-11-17 NOTE — PROGRESS NOTES
thrombocytopenia  Leukopenia  Lymphocytopenia  Rash  Hypocomplementemia  Hypokalemia  Cachexia BMI 20  Procalcitonin elevation  CRP elevation  LDH elevation  Care Everywhere chart review CBC has been abnormal for a long time with pancytopenia and thrombocytopenia    Given immunosuppressed state with pancytopenia autoimmune disease as well as CellCept remains at risk for atypical infection including bacterial as well as viral and fungal infections    Agree with antibiotic coverage will include levofloxacin to cover atypical bacterial infection.  Will check fungal serologies, check for endemic mycosis, check for viral infection like CMV    Labs, Microbiology, Radiology and pertinent results from current hospitalization and care every where were reviewed by me as a part of the consultation.    PLAN :  Continue IV cefepime 2 g every 8 hours  Continue IV vancomycin x 1250 mg Q 12 hr  Cont  IV levofloxacin 750 mg every 24HRS   respiratory viral panel  -ve  Check sputum culture   LDH high     HIV screen  -ve   hepatitis screen  -ve  Check CMV PCR  Check fungal antibody, urine histoplasma antigen, Serum histoplasma antigen, serum cryptococcal antigen  , Check beta D glucan  Sputum for PJP PCR  Correct thrombocytopenia  Trend procalcitonin  Hold CellCept   Will add IV Antifungal therapy if more fevers choose IV Voriconazole     Discussed with patient/Family and Nursing     Medical Decision Making:  The following items were considered in medical decision making:  Discussion of patient care with other providers  Reviewed clinical lab tests  Reviewed radiology tests  Reviewed other diagnostic tests/interventions  Independent review of radiologic images  Independent review of  Microbiology cultures and other micro tests reviewed     Risk of Complications/Morbidity: High      Illness(es)/ Infection present that pose threat to bodily function.   There is potential for severe exacerbation of infection/side effects of

## 2024-11-18 PROBLEM — E43 SEVERE MALNUTRITION (HCC): Status: ACTIVE | Noted: 2024-11-18

## 2024-11-18 LAB
(1,3)-BETA-D-GLUCAN (FUNGITELL) INTERPRETATION: NEGATIVE
1,3 BETA GLUCAN SER-MCNC: <31 PG/ML
ALBUMIN SERPL-MCNC: 3.5 G/DL (ref 3.4–5)
ANION GAP SERPL CALCULATED.3IONS-SCNC: 9 MMOL/L (ref 3–16)
ANISOCYTOSIS BLD QL SMEAR: ABNORMAL
BASOPHILS # BLD: 0 K/UL (ref 0–0.2)
BASOPHILS NFR BLD: 0 %
BUN SERPL-MCNC: 18 MG/DL (ref 7–20)
BURR CELLS BLD QL SMEAR: ABNORMAL
CALCIUM SERPL-MCNC: 8.9 MG/DL (ref 8.3–10.6)
CHLORIDE SERPL-SCNC: 105 MMOL/L (ref 99–110)
CMV DNA SERPL NAA+PROBE-ACNC: NOT DETECTED IU/ML
CMV DNA SERPL NAA+PROBE-LOG IU: NOT DETECTED LOG IU/ML
CMV DNA SERPL QL NAA+PROBE: NOT DETECTED
CO2 SERPL-SCNC: 20 MMOL/L (ref 21–32)
CREAT SERPL-MCNC: 0.8 MG/DL (ref 0.9–1.3)
CRYPTOC AG SER QL IA: NEGATIVE
DACRYOCYTES BLD QL SMEAR: ABNORMAL
DEPRECATED RDW RBC AUTO: 16.7 % (ref 12.4–15.4)
EOSINOPHIL # BLD: 0 K/UL (ref 0–0.6)
EOSINOPHIL NFR BLD: 0 %
GFR SERPLBLD CREATININE-BSD FMLA CKD-EPI: >90 ML/MIN/{1.73_M2}
GLUCOSE SERPL-MCNC: 103 MG/DL (ref 70–99)
H CAPSUL AG UR IA-ACNC: NOT DETECTED NG/ML
H CAPSUL AG UR QL IA: NOT DETECTED
HCT VFR BLD AUTO: 28.1 % (ref 40.5–52.5)
HGB BLD-MCNC: 9.2 G/DL (ref 13.5–17.5)
LYMPHOCYTES # BLD: 1.5 K/UL (ref 1–5.1)
LYMPHOCYTES NFR BLD: 45 %
MAGNESIUM SERPL-MCNC: 1.91 MG/DL (ref 1.8–2.4)
MCH RBC QN AUTO: 27 PG (ref 26–34)
MCHC RBC AUTO-ENTMCNC: 32.8 G/DL (ref 31–36)
MCV RBC AUTO: 82.3 FL (ref 80–100)
MONOCYTES # BLD: 0.6 K/UL (ref 0–1.3)
MONOCYTES NFR BLD: 20 %
NEUTROPHILS # BLD: 1 K/UL (ref 1.7–7.7)
NEUTROPHILS NFR BLD: 28 %
NEUTS BAND NFR BLD MANUAL: 5 % (ref 0–7)
OVALOCYTES BLD QL SMEAR: ABNORMAL
PATH INTERP BLD-IMP: NO
PATH INTERP BLD-IMP: NORMAL
PHOSPHATE SERPL-MCNC: 2.1 MG/DL (ref 2.5–4.9)
PLATELET # BLD AUTO: 18 K/UL (ref 135–450)
PLATELET BLD QL SMEAR: ABNORMAL
PMV BLD AUTO: 9.5 FL (ref 5–10.5)
POIKILOCYTOSIS BLD QL SMEAR: ABNORMAL
POTASSIUM SERPL-SCNC: 3.5 MMOL/L (ref 3.5–5.1)
RBC # BLD AUTO: 3.41 M/UL (ref 4.2–5.9)
SCHISTOCYTES BLD QL SMEAR: ABNORMAL
SLIDE REVIEW: ABNORMAL
SODIUM SERPL-SCNC: 134 MMOL/L (ref 136–145)
VANCOMYCIN SERPL-MCNC: 4.8 UG/ML
VARIANT LYMPHS NFR BLD MANUAL: 2 % (ref 0–6)
WBC # BLD AUTO: 3.1 K/UL (ref 4–11)

## 2024-11-18 PROCEDURE — 6360000002 HC RX W HCPCS: Performed by: INTERNAL MEDICINE

## 2024-11-18 PROCEDURE — 2580000003 HC RX 258: Performed by: INTERNAL MEDICINE

## 2024-11-18 PROCEDURE — 6370000000 HC RX 637 (ALT 250 FOR IP): Performed by: INTERNAL MEDICINE

## 2024-11-18 PROCEDURE — 2060000000 HC ICU INTERMEDIATE R&B

## 2024-11-18 PROCEDURE — 80202 ASSAY OF VANCOMYCIN: CPT

## 2024-11-18 PROCEDURE — 83735 ASSAY OF MAGNESIUM: CPT

## 2024-11-18 PROCEDURE — 36415 COLL VENOUS BLD VENIPUNCTURE: CPT

## 2024-11-18 PROCEDURE — 99233 SBSQ HOSP IP/OBS HIGH 50: CPT | Performed by: INTERNAL MEDICINE

## 2024-11-18 PROCEDURE — 80069 RENAL FUNCTION PANEL: CPT

## 2024-11-18 PROCEDURE — 85025 COMPLETE CBC W/AUTO DIFF WBC: CPT

## 2024-11-18 PROCEDURE — 94760 N-INVAS EAR/PLS OXIMETRY 1: CPT

## 2024-11-18 PROCEDURE — 94640 AIRWAY INHALATION TREATMENT: CPT

## 2024-11-18 PROCEDURE — 99232 SBSQ HOSP IP/OBS MODERATE 35: CPT | Performed by: INTERNAL MEDICINE

## 2024-11-18 RX ADMIN — LISINOPRIL 40 MG: 40 TABLET ORAL at 09:20

## 2024-11-18 RX ADMIN — IPRATROPIUM BROMIDE AND ALBUTEROL SULFATE 1 DOSE: 2.5; .5 SOLUTION RESPIRATORY (INHALATION) at 20:59

## 2024-11-18 RX ADMIN — IPRATROPIUM BROMIDE AND ALBUTEROL SULFATE 1 DOSE: 2.5; .5 SOLUTION RESPIRATORY (INHALATION) at 12:13

## 2024-11-18 RX ADMIN — HYDROCHLOROTHIAZIDE 25 MG: 25 TABLET ORAL at 09:20

## 2024-11-18 RX ADMIN — Medication 1 TABLET: at 09:20

## 2024-11-18 RX ADMIN — IPRATROPIUM BROMIDE AND ALBUTEROL SULFATE 1 DOSE: 2.5; .5 SOLUTION RESPIRATORY (INHALATION) at 09:19

## 2024-11-18 RX ADMIN — TAMSULOSIN HYDROCHLORIDE 0.4 MG: 0.4 CAPSULE ORAL at 21:39

## 2024-11-18 RX ADMIN — LEVOFLOXACIN 750 MG: 750 INJECTION, SOLUTION INTRAVENOUS at 18:20

## 2024-11-18 RX ADMIN — CEFEPIME 2000 MG: 2 INJECTION, POWDER, FOR SOLUTION INTRAVENOUS at 21:46

## 2024-11-18 RX ADMIN — SODIUM CHLORIDE, PRESERVATIVE FREE 10 ML: 5 INJECTION INTRAVENOUS at 21:39

## 2024-11-18 RX ADMIN — ACETAMINOPHEN 325MG 650 MG: 325 TABLET ORAL at 04:54

## 2024-11-18 RX ADMIN — CEFEPIME 2000 MG: 2 INJECTION, POWDER, FOR SOLUTION INTRAVENOUS at 12:11

## 2024-11-18 RX ADMIN — SODIUM CHLORIDE, PRESERVATIVE FREE 10 ML: 5 INJECTION INTRAVENOUS at 09:20

## 2024-11-18 ASSESSMENT — PAIN DESCRIPTION - ORIENTATION: ORIENTATION: POSTERIOR

## 2024-11-18 ASSESSMENT — PAIN SCALES - GENERAL: PAINLEVEL_OUTOF10: 0

## 2024-11-18 ASSESSMENT — PAIN - FUNCTIONAL ASSESSMENT: PAIN_FUNCTIONAL_ASSESSMENT: PREVENTS OR INTERFERES SOME ACTIVE ACTIVITIES AND ADLS

## 2024-11-18 ASSESSMENT — PAIN DESCRIPTION - DESCRIPTORS: DESCRIPTORS: TENDER

## 2024-11-18 ASSESSMENT — PAIN DESCRIPTION - LOCATION: LOCATION: NECK

## 2024-11-18 NOTE — PROGRESS NOTES
Pulmonary Progress Note    CC:  Follow up pneumonia    Subjective:  Room air  Feels 80% better      Intake/Output Summary (Last 24 hours) at 11/18/2024 0810  Last data filed at 11/17/2024 2212  Gross per 24 hour   Intake 720 ml   Output 650 ml   Net 70 ml         PHYSICAL EXAM:  Blood pressure 113/66, pulse 93, temperature 98.3 °F (36.8 °C), temperature source Oral, resp. rate 15, height 1.753 m (5' 9\"), weight 62.7 kg (138 lb 4.8 oz), SpO2 98%.'  Gen: No distress. Thin  Eyes: PERRL. No sclera icterus. No conjunctival injection.   ENT: No discharge. Pharynx clear. External appearance of ears and nose normal.  Neck: Trachea midline. No obvious mass.    Resp: No crackles. No wheezes. No rhonchi. No dullness on percussion.  CV: Tachy. No murmur or rub.    GI: Non-tender. Non-distended. No hernia.   Skin: Warm, dry, normal texture and turgor. No nodule on exposed extremities.   Lymph: No cervical LAD. No supraclavicular LAD.   M/S: No cyanosis. No clubbing. No joint deformity.    Neuro: Moves all four extremities. CN 2-12 tested, no defect noted.  Ext:   no edema    Medications:    Scheduled Meds:   levofloxacin  750 mg IntraVENous Q24H    hydroCHLOROthiazide  25 mg Oral Daily    lisinopril  40 mg Oral Daily    therapeutic multivitamin-minerals  1 tablet Oral Daily    [Held by provider] mycophenolate  1,000 mg Oral BID    tamsulosin  0.4 mg Oral Nightly    sodium chloride flush  5-40 mL IntraVENous 2 times per day    cefepime  2,000 mg IntraVENous Q8H    vancomycin  1,250 mg IntraVENous Q18H    vancomycin (VANCOCIN) intermittent dosing (placeholder)   Other RX Placeholder    ipratropium 0.5 mg-albuterol 2.5 mg  1 Dose Inhalation TID RT       Continuous Infusions:   sodium chloride         PRN Meds:  baclofen, sodium chloride flush, sodium chloride, ondansetron **OR** ondansetron, polyethylene glycol, acetaminophen **OR** acetaminophen, perflutren lipid microspheres    Labs:  CBC:   Recent Labs     11/16/24  0515

## 2024-11-18 NOTE — PROGRESS NOTES
INTAKE/OUTPUT:    Intake/Output Summary (Last 24 hours) at 11/18/2024 0831  Last data filed at 11/17/2024 2212  Gross per 24 hour   Intake 720 ml   Output 650 ml   Net 70 ml       CONSTITUTIONAL: awake, alert, cooperative, no apparent distress   EYES: pupils equal, round and reactive to light, sclera clear and conjunctiva normal  ENT: Normocephalic, without obvious abnormality, atraumatic  NECK: supple, symmetrical, no jugular venous distension and no carotid bruits   HEMATOLOGIC/LYMPHATIC: no cervical, supraclavicular or axillary lymphadenopathy   LUNGS: no increased work of breathing and clear to auscultation   CARDIOVASCULAR: regular rate and rhythm, normal S1 and S2, no murmur noted  ABDOMEN: normal bowel sounds x 4, soft, non-distended, non-tender, no masses palpated, no hepatosplenomgaly   MUSCULOSKELETAL: full range of motion noted, tone is normal  NEUROLOGIC: awake, alert, oriented to name, place and time. Motor skills grossly intact.   SKIN: Normal skin color, texture, turgor and no jaundice. appears intact   EXTREMITIES: no LE edema     DATA:  CBC:    Recent Labs     11/18/24  0432 11/17/24  0833 11/16/24  1612 11/16/24  0515 11/15/24  1143   WBC 3.1* 4.1 5.1 3.2* 3.5*   NEUTROABS  --  2.4  --  2.4 2.5   LYMPHOPCT  --  16.0  --  7.0 9.0   RBC 3.41* 3.48* 3.52* 3.55* 3.73*   HGB 9.2* 9.3* 9.6* 9.5* 10.0*   HCT 28.1* 28.6* 28.9* 29.2* 30.9*   MCV 82.3 82.2 82.4 82.3 82.8   MCH 27.0 26.8 27.3 26.8 26.8   MCHC 32.8 32.6 33.2 32.5 32.4   RDW 16.7* 16.3* 16.6* 16.5* 16.5*   PLT  --  26* 32* 7* 11*       PT/INR:  No results for input(s): \"INR\" in the last 720 hours.    Invalid input(s): \"PROT\"  PTT:  No results for input(s): \"APTT\" in the last 720 hours.    CMP:    Recent Labs     11/18/24  0432 11/17/24  0538 11/16/24  0515 11/15/24  1103   * 135* 130* 130*   K 3.5 3.2* 3.5 3.2*    103 99 95*   CO2 20* 20* 20* 22   GLUCOSE 103* 97 80 97   BUN 18 21* 35* 43*   CREATININE 0.8* 0.9 1.2 1.3   LABGLOM >90

## 2024-11-18 NOTE — CARE COORDINATION
11/18/24 1202   Service Assessment   Patient Orientation Alert and Oriented   Cognition Alert   History Provided By Patient   Primary Caregiver Self   Accompanied By/Relationship no one   Support Systems Parent;Children;Family Members   Patient's Healthcare Decision Maker is: Legal Next of Kin   PCP Verified by CM Yes   Last Visit to PCP Within last 6 months   Prior Functional Level Assistance with the following:;Shopping   Current Functional Level Assistance with the following:;Shopping   Can patient return to prior living arrangement Yes   Ability to make needs known: Good   Family able to assist with home care needs: Yes  (daughter)   Would you like for me to discuss the discharge plan with any other family members/significant others, and if so, who? No   Financial Resources Medicaid   Community Resources None   CM/SW Referral Other (see comment)  (discharge planning)   Discharge Planning   Type of Residence Apartment   Living Arrangements Alone   Current Services Prior To Admission None   Potential Assistance Needed Durable Medical Equipment   Potential DME Needed Walker;Shower Chair   DME Ordered? No   Potential Assistance Purchasing Medications No   Type of Home Care Services None   Patient expects to be discharged to: Apartment   One/Two Story Residence One story   History of falls? 1   Services At/After Discharge   Transition of Care Consult (CM Consult) N/A   Services At/After Discharge None    Resource Information Provided? No   Mode of Transport at Discharge Other (see comment)  (car)   Confirm Follow Up Transport Self     Case Management Assessment  Initial Evaluation    Date/Time of Evaluation: 11/18/2024 3:02 PM  Assessment Completed by: Janet Preciado RN    If patient is discharged prior to next notation, then this note serves as note for discharge by case management.    Patient Name: Dusty Jauregui                   YOB: 1970  Diagnosis: Syncope and collapse

## 2024-11-18 NOTE — PROGRESS NOTES
V2.0    [facility] [place] Progress Note      Name:  Dusty Jauregui /Age/Sex: 1970  (54 y.o. male)   MRN & CSN:  [id number] & [id number] Encounter Date/Time: 2024 12:37 PM EST   Location:  [id number] PCP: Linda Carpenter APRN - CNP     Attending:Ramsey Pereyra MD       Hospital Day: 4    Assessment and Recommendations   Dusty Jauregui is a 54 y.o. male who presents with Sepsis (HCC)             Sepsis, due to pneumonia, likely gram-negative pneumonia, patient is immunocompromised, pulmonology, ID consulted and following   gram-negative pneumonia, IV cefepime, also with vancomycin as I cannot completely exclude MRSA pneumonia Levaquin added per ID   syncope and collapse with subcutaneous hematoma forehead SCD for DVT prophylaxis echo ordered likely vasovagal patient was sitting at the edge of the bed  Immunodeficiency status with variable monoclonal gammopathy, consulted hematology input  1.9 cm pulmonary note, will consult pulmonology  Thrombocytopenia, no signs of bleeding at this time     Diet ADULT DIET; Regular  ADULT ORAL NUTRITION SUPPLEMENT; Breakfast, Lunch, Dinner; Standard High Calorie/High Protein Oral Supplement   DVT Prophylaxis [] Lovenox, []  Heparin, [] SCDs, [] Ambulation,  [] Eliquis, [] Xarelto  [] Coumadin                 Personally reviewed Lab Studies and Imaging     Discussed management of the case with infectious disease who recommended IV antibiotics    Rhythm strip independently interpreted by myself heart rate 97 QT 0 35 no ST elevation        Drugs that require monitoring for toxicity include vancomycin and the method of monitoring was creatinine to make sure no BRET as toxicity    Medical Decision Making:  The following items were considered in medical decision making:  Discussion of patient care with other providers  Reviewed clinical lab tests  Reviewed radiology tests  Reviewed other diagnostic tests/interventions  Independent review of radiologic  105   CO2 20* 20* 20*   BUN 35* 21* 18   CREATININE 1.2 0.9 0.8*   GLUCOSE 80 97 103*     Hepatic:   Recent Labs     11/16/24  0515   AST 75*   ALT 45*   BILITOT 0.4   ALKPHOS 56     Lipids:   Lab Results   Component Value Date/Time    CHOL 150 01/02/2023 09:23 PM    HDL 38 01/02/2023 09:23 PM    TRIG 124 01/02/2023 09:23 PM     Hemoglobin A1C: No results found for: \"LABA1C\"  TSH: No results found for: \"TSH\"  Troponin: No results found for: \"TROPONINT\"  Lactic Acid: No results for input(s): \"LACTA\" in the last 72 hours.  BNP: No results for input(s): \"PROBNP\" in the last 72 hours.  UA:  Lab Results   Component Value Date/Time    NITRU Negative 11/15/2024 05:08 PM    COLORU Yellow 11/15/2024 05:08 PM    PHUR 5.0 11/15/2024 05:08 PM    PHUR 6.5 01/04/2023 11:25 AM    WBCUA 0-2 11/15/2024 05:08 PM    RBCUA 0-2 11/15/2024 05:08 PM    BACTERIA 1+ 11/15/2024 05:08 PM    CLARITYU CLOUDY 11/15/2024 05:08 PM    LEUKOCYTESUR Negative 11/15/2024 05:08 PM    UROBILINOGEN 0.2 11/15/2024 05:08 PM    BILIRUBINUR Negative 11/15/2024 05:08 PM    BLOODU TRACE 11/15/2024 05:08 PM    GLUCOSEU Negative 11/15/2024 05:08 PM    KETUA TRACE 11/15/2024 05:08 PM     Urine Cultures: No results found for: \"LABURIN\"  Blood Cultures:   Lab Results   Component Value Date/Time    BC  11/15/2024 11:43 AM     No Growth to date.  Any change in status will be called.     Lab Results   Component Value Date/Time    BLOODCULT2  11/15/2024 11:43 AM     No Growth to date.  Any change in status will be called.     Organism: No results found for: \"ORG\"      Electronically signed by Ramsey Pereyra MD on 11/18/2024 at 12:37 PM  Comment: Please note this report has been produced using speech recognition software and may contain errors related to that system including errors in grammar, punctuation, and spelling, as well as words and phrases that may be inappropriate. If there are any questions or concerns, please feel free to contact the dictating provider

## 2024-11-18 NOTE — PROGRESS NOTES
RN entered room and found pt's bed to be elevated very high. When asked, pt stated that he coughs less when his bed is that high because the \"air is different up here\". RN educated pt on the safety risk of bed being that high. Pt refuses to lower bed.  Electronically signed by Guillermina Hayes RN on 11/18/24 at 7:59 AM EST    Pt up to chair most of shift.   Pt has taken all ordered meds this shift.   Continues on IV atb.   States he feels like he is almost ready to go home.   Electronically signed by Guillermina Hayes RN on 11/18/24 at 5:35 PM EST

## 2024-11-18 NOTE — PLAN OF CARE
Problem: Discharge Planning  Goal: Discharge to home or other facility with appropriate resources  Outcome: Progressing     Problem: Pain  Goal: Verbalizes/displays adequate comfort level or baseline comfort level  Outcome: Progressing     Problem: ABCDS Injury Assessment  Goal: Absence of physical injury  Outcome: Progressing     Problem: Safety - Adult  Goal: Free from fall injury  Outcome: Progressing  Note: Fall precautions in place. Bed locked and in lowest position. Alarm on. Call light within reach.

## 2024-11-18 NOTE — PROGRESS NOTES
Comprehensive Nutrition Assessment    Type and Reason for Visit:  Initial, Positive nutrition screen (decreased appetite, weight loss)    Nutrition Recommendations/Plan:   Regular diet  Continue Ensure with meals, would like strawberry when they come in on 11/19  Recommended increasing calories to 2500 per day to promote weight gain, patient motivated to improve nutritional status and appreciated the information     Malnutrition Assessment:  Malnutrition Status:  Severe malnutrition (11/18/24 2126)    Context:  Chronic Illness     Findings of the 6 clinical characteristics of malnutrition:  Energy Intake:  75% or less estimated energy requirements for 1 month or longer  Weight Loss:  Greater than 10% over 6 months     Body Fat Loss:  Severe body fat loss Buccal region, Orbital   Muscle Mass Loss:  Severe muscle mass loss Temples (temporalis), Clavicles (pectoralis & deltoids) (upper arms, patient aware and showed RD at time of visit)  Fluid Accumulation:  Unable to assess     Strength:  Not Performed    Nutrition Assessment:    Patient admitted with sepsis.  History of pancytopenia and lupus.  Nutrition risk triggered due to weight loss and decreased appetite prior to admission.  Currently on a regular diet with Ensure supplements ordered tid.  Po intake % meals so far this admission.  Weight has been trending down from 160-170's earlier this year (July) to 138 lbs this admission.  Patient was unaware his weight had dropped from 151 to 138 lbs in recent weeks.  RD provided guidance on how to gain weight appropriately, adding at least 500 calories daily.  Patient is drinking Ensure as well.  Patient was writing down notes as I was speaking with him.    Nutrition Related Findings:    Phos 2.1, Na 134  on 11/18; last BM on 11/18 Wound Type: None       Current Nutrition Intake & Therapies:    Average Meal Intake: 51-75%, %  Average Supplements Intake: 51-75%, %  ADULT DIET; Regular  ADULT ORAL  NUTRITION SUPPLEMENT; Breakfast, Lunch, Dinner; Standard High Calorie/High Protein Oral Supplement    Anthropometric Measures:  Height: 175.3 cm (5' 9\")  Ideal Body Weight (IBW): 160 lbs (73 kg)       Current Body Weight: 62.7 kg (138 lb 3.7 oz),   IBW. Weight Source: Standing scale  Current BMI (kg/m2): 20.4                             BMI Categories: Normal Weight (BMI 18.5-24.9)    Estimated Daily Nutrient Needs:  Energy Requirements Based On: Kcal/kg  Weight Used for Energy Requirements: Current  Energy (kcal/day): 0483-6349 (35-40 kcal/62.7 kg)  Weight Used for Protein Requirements: Current  Protein (g/day): 82-94 (1.3-1.5 g/62.7 kg)  Method Used for Fluid Requirements: 1 ml/kcal  Fluid (ml/day):      Nutrition Diagnosis:   Severe malnutrition, in context of chronic illness related to inadequate protein-energy intake as evidenced by muscle loss, poor intake prior to admission, loss of subcutaneous fat, weight loss    Nutrition Interventions:   Food and/or Nutrient Delivery: Continue Current Diet, Continue Oral Nutrition Supplement     Coordination of Nutrition Care: Continue to monitor while inpatient       Goals:  Goals: PO intake 50% or greater  Type of Goal: New goal       Nutrition Monitoring and Evaluation:      Food/Nutrient Intake Outcomes: Food and Nutrient Intake, Supplement Intake  Physical Signs/Symptoms Outcomes: Biochemical Data, Nutrition Focused Physical Findings    Discharge Planning:    No discharge needs at this time     JOHN LO RD, LD  Contact: Office: [phone]; Radames: [phone]

## 2024-11-18 NOTE — PROGRESS NOTES
Pt refused IV medications Cefepime and Vancomycin at 2000. Pt stated he wanted to wait until 2200. Pt refused medication at 2300. Pt then stated \"he would wait until 0000.\" Pt stated \"he isn't comfortable at this time to take medication.\" Pt stated \"he doesn't like the way the medications makes his body feel.\" Pt then stated \"he is receiving medication too often.\" Nurse educated patient on importance of taking prescribed medications. Nurse notified MD.

## 2024-11-18 NOTE — PROGRESS NOTES
Infectious Diseases Inpatient Progress Note        CHIEF COMPLAINT:       High fever  Pneumonia  Pancytopenia  Lupus  On CellCept  Thrombocytopenia  CRP elevation  LDH elevation    HISTORY OF PRESENT ILLNESS:  54 y.o. male with a significant history for SLE positive for GOLD and hypocomplementemia pancytopenia followed at rheumatology at [facility] he is currently on mycophenolate is taking 1000 mg daily per recent rheumatology note there was a discussion about changing the medication but patient was not interested patient now admitted to hospital secondary to ongoing fever on and off for the past 5 days not feeling well with cough and some sob not much sputum and labs indicate potassium 3.2 procalcitonin 2.61 ALT 58  WBC 3.2 hemoglobin 9.5 platelets at 735% bandemia urinalysis negative, CT chest with groundglass opacities and subpleural reticular markings noted possible likely early fibrotic changes CT head negative we are consulted for antibiotic recommendations    Interval history: Intermittent cough slowly improving able to oxygenate okay on room air fever slowly trending down.  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 No pertinent surgical history.    Current Medications:    Outpatient Medications Marked as Taking for the 11/15/24 encounter (Hospital Encounter)   Medication Sig Dispense Refill    baclofen (LIORESAL) 10 MG tablet Take 1 tablet by mouth 3 times daily as needed

## 2024-11-19 LAB
ACANTHOCYTES BLD QL SMEAR: ABNORMAL
ANISOCYTOSIS BLD QL SMEAR: ABNORMAL
BACTERIA BLD CULT ORG #2: NORMAL
BACTERIA BLD CULT: NORMAL
BASOPHILS # BLD: 0 K/UL (ref 0–0.2)
BASOPHILS NFR BLD: 0 %
COPPER SERPL-MCNC: 127.6 UG/DL (ref 70–140)
DACRYOCYTES BLD QL SMEAR: ABNORMAL
DEPRECATED RDW RBC AUTO: 16.5 % (ref 12.4–15.4)
EOSINOPHIL # BLD: 0 K/UL (ref 0–0.6)
EOSINOPHIL NFR BLD: 1 %
GALACTOMANNAN AG SERPL IA-ACNC: 0.07
GALACTOMANNAN AG SERPL QL IA: NEGATIVE
H CAPSUL AG SER IA-ACNC: NOT DETECTED
H CAPSUL AG SER QL IA: NOT DETECTED
HCT VFR BLD AUTO: 26.5 % (ref 40.5–52.5)
HGB BLD-MCNC: 8.6 G/DL (ref 13.5–17.5)
LYMPHOCYTES # BLD: 0.9 K/UL (ref 1–5.1)
LYMPHOCYTES NFR BLD: 39 %
MCH RBC QN AUTO: 26.8 PG (ref 26–34)
MCHC RBC AUTO-ENTMCNC: 32.6 G/DL (ref 31–36)
MCV RBC AUTO: 82.3 FL (ref 80–100)
MICROCYTES BLD QL SMEAR: ABNORMAL
MONOCYTES # BLD: 0.6 K/UL (ref 0–1.3)
MONOCYTES NFR BLD: 27 %
NEUTROPHILS # BLD: 0.8 K/UL (ref 1.7–7.7)
NEUTROPHILS NFR BLD: 33 %
PATH INTERP BLD-IMP: NO
PLATELET # BLD AUTO: 16 K/UL (ref 135–450)
PLATELET BLD QL SMEAR: ABNORMAL
PMV BLD AUTO: 9.3 FL (ref 5–10.5)
POIKILOCYTOSIS BLD QL SMEAR: ABNORMAL
RBC # BLD AUTO: 3.22 M/UL (ref 4.2–5.9)
SCHISTOCYTES BLD QL SMEAR: ABNORMAL
SLIDE REVIEW: ABNORMAL
TARGETS BLD QL SMEAR: ABNORMAL
WBC # BLD AUTO: 2.4 K/UL (ref 4–11)
ZINC SERPL-MCNC: 53.2 UG/DL (ref 60–120)

## 2024-11-19 PROCEDURE — 97530 THERAPEUTIC ACTIVITIES: CPT

## 2024-11-19 PROCEDURE — 6360000002 HC RX W HCPCS: Performed by: INTERNAL MEDICINE

## 2024-11-19 PROCEDURE — 36415 COLL VENOUS BLD VENIPUNCTURE: CPT

## 2024-11-19 PROCEDURE — 97116 GAIT TRAINING THERAPY: CPT

## 2024-11-19 PROCEDURE — 99233 SBSQ HOSP IP/OBS HIGH 50: CPT | Performed by: INTERNAL MEDICINE

## 2024-11-19 PROCEDURE — 6370000000 HC RX 637 (ALT 250 FOR IP): Performed by: INTERNAL MEDICINE

## 2024-11-19 PROCEDURE — 94640 AIRWAY INHALATION TREATMENT: CPT

## 2024-11-19 PROCEDURE — 97110 THERAPEUTIC EXERCISES: CPT

## 2024-11-19 PROCEDURE — 97535 SELF CARE MNGMENT TRAINING: CPT

## 2024-11-19 PROCEDURE — 2580000003 HC RX 258: Performed by: INTERNAL MEDICINE

## 2024-11-19 PROCEDURE — 2060000000 HC ICU INTERMEDIATE R&B

## 2024-11-19 PROCEDURE — 85025 COMPLETE CBC W/AUTO DIFF WBC: CPT

## 2024-11-19 PROCEDURE — 94760 N-INVAS EAR/PLS OXIMETRY 1: CPT

## 2024-11-19 RX ORDER — CETIRIZINE HYDROCHLORIDE 10 MG/1
5 TABLET ORAL EVERY 6 HOURS PRN
Status: DISCONTINUED | OUTPATIENT
Start: 2024-11-19 | End: 2024-11-22 | Stop reason: HOSPADM

## 2024-11-19 RX ADMIN — TAMSULOSIN HYDROCHLORIDE 0.4 MG: 0.4 CAPSULE ORAL at 20:20

## 2024-11-19 RX ADMIN — IPRATROPIUM BROMIDE AND ALBUTEROL SULFATE 1 DOSE: 2.5; .5 SOLUTION RESPIRATORY (INHALATION) at 09:21

## 2024-11-19 RX ADMIN — LEVOFLOXACIN 750 MG: 500 TABLET, FILM COATED ORAL at 16:38

## 2024-11-19 RX ADMIN — Medication 1 TABLET: at 09:02

## 2024-11-19 RX ADMIN — IPRATROPIUM BROMIDE AND ALBUTEROL SULFATE 1 DOSE: 2.5; .5 SOLUTION RESPIRATORY (INHALATION) at 20:28

## 2024-11-19 RX ADMIN — SODIUM CHLORIDE, PRESERVATIVE FREE 10 ML: 5 INJECTION INTRAVENOUS at 20:19

## 2024-11-19 RX ADMIN — LISINOPRIL 40 MG: 40 TABLET ORAL at 09:02

## 2024-11-19 RX ADMIN — SODIUM CHLORIDE: 9 INJECTION, SOLUTION INTRAVENOUS at 12:10

## 2024-11-19 RX ADMIN — HYDROCHLOROTHIAZIDE 25 MG: 25 TABLET ORAL at 09:02

## 2024-11-19 RX ADMIN — CEFEPIME 2000 MG: 2 INJECTION, POWDER, FOR SOLUTION INTRAVENOUS at 04:39

## 2024-11-19 RX ADMIN — IPRATROPIUM BROMIDE AND ALBUTEROL SULFATE 1 DOSE: 2.5; .5 SOLUTION RESPIRATORY (INHALATION) at 12:18

## 2024-11-19 RX ADMIN — CEFEPIME 2000 MG: 2 INJECTION, POWDER, FOR SOLUTION INTRAVENOUS at 20:25

## 2024-11-19 RX ADMIN — CEFEPIME 2000 MG: 2 INJECTION, POWDER, FOR SOLUTION INTRAVENOUS at 12:13

## 2024-11-19 RX ADMIN — CETIRIZINE HYDROCHLORIDE 5 MG: 10 TABLET, FILM COATED ORAL at 12:14

## 2024-11-19 ASSESSMENT — PAIN SCALES - GENERAL: PAINLEVEL_OUTOF10: 0

## 2024-11-19 NOTE — PROGRESS NOTES
throughout with good tolerance.  Toileting: Stand by assistance;Supervision  Toileting Skilled Clinical Factors: Pt urinated in stance at commode SBA/sup  Functional Mobility: Supervision;Stand by assistance  Functional Mobility Skilled Clinical Factors: Pt amb without device household distances in room/hallway SBA/sup, demo steady gait, no LOB. Of note, pt required seated rest break during mobility trial d/t impaired endurance and fatigue.       Activity Tolerance  Activity Tolerance: Patient tolerated treatment well    Bed mobility  Supine to Sit: Independent  Sit to Supine: Independent    Transfers  Sit to stand: Supervision  Stand to sit: Supervision  Transfer Comments: Pt tx <> EOB and <> couch sup. no device    Vision  Vision: Impaired  Vision Exceptions: Wears glasses for reading  Hearing  Hearing: Within functional limits    Cognition  Overall Cognitive Status: WFL  Orientation  Overall Orientation Status: Within Functional Limits                 Exercise Treatment: Demo marching in place x20 reps SBA to address strength/activity tolerance for ADL/IADLs.    Education Given To: Patient  Education Provided: Role of Therapy;Plan of Care;Transfer Training;Equipment;Energy Conservation;Fall Prevention Strategies  Education Provided Comments: use of shower chair for seated bathing at home for fall prevention/energy conservation as well as energy conservation strategies related to ADL/IADL performance, POC  Education Method: Verbal  Barriers to Learning: None  Education Outcome: Verbalized understanding;Demonstrated understanding                                    AM-PAC - ADL  AM-PAC Daily Activity - Inpatient   How much help is needed for putting on and taking off regular lower body clothing?: A Little  How much help is needed for bathing (which includes washing, rinsing, drying)?: A Little  How much help is needed for toileting (which includes using toilet, bedpan, or urinal)?: None  How much help is needed for

## 2024-11-19 NOTE — PROGRESS NOTES
([facility])  N17.9       8. Pulmonary nodule  R91.1          Admitted with fever chills  Shortness of breath  Cough  Pneumonia  Immunosuppressed from CellCept and SLE  Pancytopenia  Severe thrombocytopenia  Leukopenia  Lymphocytopenia  Rash  Hypocomplementemia  Hypokalemia  Cachexia BMI 20  Procalcitonin elevation  CRP elevation  LDH elevation  Care Everywhere chart review CBC has been abnormal for a long time with pancytopenia and thrombocytopenia    Given immunosuppressed state with pancytopenia autoimmune disease as well as CellCept remains at risk for atypical infection including bacterial as well as viral and fungal infections    Agree with antibiotic coverage will include levofloxacin to cover atypical bacterial infection.  Will check fungal serologies, check for endemic mycosis, check for viral infection like CMV    Fever now seems to be trending down respiratory status slowly improving CMV PCR negative histoplasma antigen negative beta D glucan negative given the clinical response with antibiotics seems to be bacterial process will be able to choose antibiotic for discharge planning        Labs, Microbiology, Radiology and pertinent results from current hospitalization and care every where were reviewed by me as a part of the consultation.    PLAN :  Continue IV cefepime 2 g every 8 hour  Change to oral  levofloxacin 750 mg every 24HRS   respiratory viral panel  -ve   sputum culture if expectorates    LDH high     HIV screen  -ve   hepatitis screen  -ve  CMV PCR -ve  Check fungal antibody, urine histoplasma antigen negative, Serum histoplasma antigen, serum Aspergillus antigen negative cryptococcal antigen -ve  , beta D glucan negative  Sputum for PJP PCR  Correct thrombocytopenia  Trend procalcitonin  1.40   Hold CellCept   Will add IV Antifungal therapy if more fevers choose IV Voriconazole   Home if does well on oral levofloxacin 750 mg every 24 for 7 days       Discussed with patient/Family and Nursing      Medical Decision Making:  The following items were considered in medical decision making:  Discussion of patient care with other providers  Reviewed clinical lab tests  Reviewed radiology tests  Reviewed other diagnostic tests/interventions  Independent review of radiologic images  Independent review of  Microbiology cultures and other micro tests reviewed     Risk of Complications/Morbidity: High      Illness(es)/ Infection present that pose threat to bodily function.   There is potential for severe exacerbation of infection/side effects of treatment.  Therapy requires intensive monitoring for antimicrobial agent toxicity.     Thanks for allowing me to participate in your patient's care please call me with any questions or concerns.    Dr. Owen Torres MD  Infectious Disease  [facility] Physician  Phone: [phone]   Fax : [phone]

## 2024-11-19 NOTE — PLAN OF CARE
Problem: Discharge Planning  Goal: Discharge to home or other facility with appropriate resources  11/19/2024 0049 by Khuhsboo Callaway, RN  Outcome: Progressing  Flowsheets (Taken 11/19/2024 0049)  Discharge to home or other facility with appropriate resources:   Identify barriers to discharge with patient and caregiver   Arrange for needed discharge resources and transportation as appropriate   Identify discharge learning needs (meds, wound care, etc)     Problem: Pain  Goal: Verbalizes/displays adequate comfort level or baseline comfort level  11/19/2024 0049 by Khushboo Callaway, RN  Outcome: Progressing  Flowsheets (Taken 11/19/2024 0049)  Verbalizes/displays adequate comfort level or baseline comfort level:   Encourage patient to monitor pain and request assistance   Assess pain using appropriate pain scale   Administer analgesics based on type and severity of pain and evaluate response     Problem: ABCDS Injury Assessment  Goal: Absence of physical injury  11/19/2024 0049 by Khushboo Callaway, RN  Outcome: Progressing  Flowsheets (Taken 11/19/2024 0049)  Absence of Physical Injury: Implement safety measures based on patient assessment     Problem: Safety - Adult  Goal: Free from fall injury  11/19/2024 0049 by Khushboo Callaway, RN  Outcome: Progressing  Flowsheets (Taken 11/19/2024 0049)  Free From Fall Injury: Instruct family/caregiver on patient safety     Problem: Nutrition Deficit:  Goal: Optimize nutritional status  Outcome: Progressing  Flowsheets (Taken 11/19/2024 0049)  Nutrient intake appropriate for improving, restoring, or maintaining nutritional needs:   Assess nutritional status and recommend course of action   Monitor oral intake, labs, and treatment plans

## 2024-11-19 NOTE — PROGRESS NOTES
Physical Therapy  Facility/Department: [address] PROGRESSIVE CARE  Physical Therapy Treatment Note    Name: Dusty Jauregui  : 1970  MRN: [id number]  Date of Service: 2024    Discharge Recommendations:  Continue to assess pending progress, Home with assist PRN   PT Equipment Recommendations  Other: Will monitor for any equipt needs.      Dusty Jauregui scored a 20/24 on the AM-PAC short mobility form. Current research shows that an AM-PAC score of 18 or greater is typically associated with a discharge to the patient's home setting. Based on the patient's AM-PAC score and their current functional mobility deficits, it is recommended that the patient have 2-3 sessions per week of Physical Therapy at d/c to increase the patient's independence.  At this time, this patient demonstrates the endurance and safety to discharge home with Home PT Evaluation  and a follow up treatment frequency of 2-3x/wk.  Please see assessment section for further patient specific details.    If patient discharges prior to next session this note will serve as a discharge summary.  Please see below for the latest assessment towards goals.       Assessment  Body Structures, Functions, Activity Limitations Requiring Skilled Therapeutic Intervention: Decreased functional mobility ;Decreased balance  Assessment: 53 y/o male admit 11/15/204 with Syncope/Collapse, Acute Respiratory, Sepsis. CT Head/C-Spine : negative. PMH as noted including Lupus, MGUS.  PTA pt living alone in apt setting with few steps to enter; independent daily care and functional mobility.  Pt currently alvin oob, transfers/amb functional distances SBA; reports weak/limited endurance although improving.  Gait slow/slight guarded although  no specific LOB.  Initiated Stand LE Strength Exs.   At this time, anticipate d/c home.  Pt receptive to Home PT Services upon d/c.  Will cont to monitor pt's progress.  Therapy Prognosis: Fair  History: 53 y/o male admit 11/15/204  with Syncope/Collapse, Acute Respiratory, Sepsis. CT Head/C-Spine : negative. PMH as noted including Lupus, MGUS.  Exam: See above.  Clinical Presentation: See above.  Barriers to Learning: None.  Requires PT Follow-Up: Yes  Activity Tolerance  Activity Tolerance: Patient tolerated treatment well    Plan  Physical Therapy Plan  General Plan: 3-5 times per week  Current Treatment Recommendations: Strengthening, Therapeutic activities, Balance training, Functional mobility training, Transfer training, Gait training, Stair training, Safety education & training, Patient/Caregiver education & training  Safety Devices  Type of Devices: Call light within reach, Chair alarm in place, Gait belt, Left in chair  Restraints  Restraints Initially in Place: No    Restrictions  Restrictions/Precautions  Restrictions/Precautions: Up as Tolerated, Fall Risk  Position Activity Restriction  Other position/activity restrictions: Orthostatics : Supine : 124/85 (96). EOB : 132/103 (113). Stand : 132/91 (102).     Subjective  General  Chart Reviewed: Yes  Patient assessed for rehabilitation services?: Yes  Additional Pertinent Hx: 53 y/o male admit 11/15/204 with Syncope/Collapse, Acute Respiratory, Sepsis.  CT Head/C-Spine : negative.  PMH as noted including Lupus, MGUS.  Family / Caregiver Present: No  Referring Practitioner: Dr. Bentley.  Follows Commands: Within Functional Limits  Subjective  Subjective: Pt pleasant, receptive to PT Rx; eager to increase activity.  Still feels abit weak/off balance.         Social/Functional History  Social/Functional History  Lives With: Alone  Type of Home: Apartment  Home Layout: One level, Laundry in basement  Home Access: Stairs to enter with rails  Entrance Stairs - Number of Steps: 1+7  Entrance Stairs - Rails: Left  Bathroom Shower/Tub: Tub/Shower unit  Bathroom Toilet: Standard  Has the patient had two or more falls in the past year or any fall with injury in the past year?: Yes  ADL

## 2024-11-19 NOTE — PROGRESS NOTES
ONCOLOGY HEMATOLOGY CARE PROGRESS NOTE      SUBJECTIVE: no bleeding. No cp or sob.       ROS:     Constitutional:  No weight loss, No fever, No chills, No night sweats.  Energy level good.  Eyes:  No impairment or change in vision  ENT / Mouth:  No pain, abnormal ulceration, bleeding, nasal drip or change in voice or hearing  Cardiovascular:  No chest pain, palpitations, new edema, or calf discomfort  Respiratory:  No pain, hemoptysis, change to breathing  Breast:  No pain, discharge, change in appearance or texture  Gastrointestinal:  No pain, cramping, jaundice, change to eating and bowel habits  Urinary:  No pain, bleeding or change in continence  Genitalia: No pain, bleeding or discharge  Musculoskeletal:  No redness, pain, edema or weakness  Skin:  No pruritus, rash, change to nodules or lesions  Neurologic:  No discomfort, change in mental status, speech, sensory or motor activity  Psychiatric:  No change in concentration or change to affect or mood  Endocrine:  No hot flashes, increased thirst, or change to urine production  Hematologic: No petechiae, ecchymosis or bleeding  Lymphatic:  No lymphadenopathy or lymphedema  Allergy / Immunologic:  No eczema, hives, frequent or recurrent infections    OBJECTIVE        Physical    VITALS:  Patient Vitals for the past 24 hrs:   BP Temp Temp src Pulse Resp SpO2 Weight   11/19/24 0922 -- -- -- 100 -- 100 % --   11/19/24 0920 -- -- -- 100 -- (!) 75 % --   11/19/24 0919 -- -- -- (!) 102 18 100 % --   11/19/24 0725 115/72 98.7 °F (37.1 °C) Oral 88 15 100 % --   11/19/24 0344 127/74 98 °F (36.7 °C) Oral 98 -- 100 % 63.3 kg (139 lb 8.8 oz)   11/19/24 0018 124/82 97.8 °F (36.6 °C) Oral 100 18 -- --   11/18/24 2101 -- -- -- -- 18 -- --   11/18/24 1956 127/79 97.9 °F (36.6 °C) Oral (!) 105 -- 100 % --   11/18/24 1534 105/67 98.5 °F (36.9 °C) Oral (!) 109 18 98 % --   11/18/24 1214 -- -- -- (!) 103 18 -- --   11/18/24 1212 (!) 118/91 97.9

## 2024-11-19 NOTE — PROGRESS NOTES
Patient continues to question and delay antibiotics as they are due. Have discussed schedule length of infusion and flush and different types of antibiotics. Reinforced importance of sticking to antibiotic schedule as ordered by pharmacist as labs and other treatments can be affected by schedule. Verbalizes understanding. No acute changes through night.Khushboo Callaway RN

## 2024-11-19 NOTE — PLAN OF CARE
Problem: Discharge Planning  Goal: Discharge to home or other facility with appropriate resources  11/19/2024 1002 by Yue Thompson RN  Outcome: Progressing     Problem: Pain  Goal: Verbalizes/displays adequate comfort level or baseline comfort level  11/19/2024 1002 by Yue Thompson RN  Outcome: Progressing     Problem: ABCDS Injury Assessment  Goal: Absence of physical injury  11/19/2024 1002 by Yue Thompson RN  Outcome: Progressing     Problem: Safety - Adult  Goal: Free from fall injury  11/19/2024 1002 by Yue Thompson RN  Outcome: Progressing     Problem: Nutrition Deficit:  Goal: Optimize nutritional status  11/19/2024 1002 by Yue Thompson RN  Outcome: Progressing

## 2024-11-19 NOTE — PROGRESS NOTES
V2.0    [facility] [place]      Name:  Dusty Jauregui /Age/Sex: 1970  (54 y.o. male)   MRN & CSN:  [id number] & [id number] Encounter Date/Time: 2024 1:18 PM EST   Location:  [id number] PCP: Linda Carpenter APRN - CNP     Attending:Ramsey Pereyra MD       Hospital Day: 5    Assessment and Recommendations   Dusty Jauregui is a 54 y.o. male who presents with Sepsis (HCC)      Plan     Sepsis, due to pneumonia, likely gram-negative pneumonia, patient is immunocompromised, pulmonology, ID consulted and following continue to be on cefepime  gram-negative pneumonia, IV cefepime, also with vancomycin on admission which is discontinued now, Levaquin added per ID   syncope and collapse with subcutaneous hematoma forehead SCD for DVT prophylaxis echo ordered likely vasovagal patient was sitting at the edge of the bed  Immunodeficiency status with variable monoclonal gammopathy, consulted hematology input  1.9 cm pulmonary note, pulmonology consulted  Thrombocytopenia, no signs of bleeding at this time       Diet ADULT DIET; Regular  ADULT ORAL NUTRITION SUPPLEMENT; Breakfast, Lunch, Dinner; Standard High Calorie/High Protein Oral Supplement   DVT Prophylaxis [] Lovenox, []  Heparin, [] SCDs, [] Ambulation,  [] Eliquis, [] Xarelto  [] Coumadin   Code Status Full Code             Personally reviewed Lab Studies and Imaging     Discussed management of the case with infectious disease who recommended IV cefepime    Rhythm strip independently interpreted by myself heart rate 94 QT 0 37 no ST elevation      Drugs that require monitoring for toxicity include Levaquin and the method of monitoring was QT interval to avoid prolonged QT    Medical Decision Making:  The following items were considered in medical decision making:  Discussion of patient care with other providers  Reviewed clinical lab tests  Reviewed radiology tests  Reviewed other diagnostic tests/interventions  Independent review of  region which may be related to patient trauma.  No underlying skull fracture is identified.  Focal defect along posterior ring of C1 favored to be developmental.     No evidence of acute intracranial abnormality.     Echo (TTE) complete (PRN contrast/bubble/strain/3D)    Result Date: 11/16/2024    Left Ventricle: Hyperdynamic left ventricular systolic function with a visually estimated EF of 65 - 70%. Left ventricle size is normal. Moderately increased wall thickness left ventricular wall appears echogenic.  Cannot exclude infiltrative cardiomyopathy.  Consider cardiac MRI if clinically appropriate. Normal wall motion. Normal diastolic function.   Mitral Valve: Mild regurgitation.   Tricuspid Valve: Mild regurgitation with a centrally directed jet.   Image quality is excellent.     CT CHEST WO CONTRAST    Result Date: 11/15/2024  EXAMINATION: CT OF THE CHEST WITHOUT CONTRAST 11/15/2024 1:55 pm TECHNIQUE: CT of the chest was performed without the administration of intravenous contrast. Multiplanar reformatted images are provided for review. Automated exposure control, iterative reconstruction, and/or weight based adjustment of the mA/kV was utilized to reduce the radiation dose to as low as reasonably achievable. COMPARISON: 01/02/2023 chest radiograph HISTORY: ORDERING SYSTEM PROVIDED HISTORY: abnromal CXR TECHNOLOGIST PROVIDED HISTORY: Reason for exam:->abnromal CXR Decision Support Exception - unselect if not a suspected or confirmed emergency medical condition->Emergency Medical Condition (MA) Reason for Exam: synocpe, fall back pain head injury FINDINGS: Chest Wall and Thoracic Inlet: No axillary or supraclavicular lymphadenopathy.  Visualized portions of the thyroid gland are unremarkable. Mediastinum and Micki: A borderline prominent left lower paratracheal lymph node measures 1.0 cm.  There is a calcified subcarinal lymph node compatible with prior granulomatous disease.  Normal caliber of the thoracic aorta.

## 2024-11-20 LAB
ANION GAP SERPL CALCULATED.3IONS-SCNC: 12 MMOL/L (ref 3–16)
BUN SERPL-MCNC: 14 MG/DL (ref 7–20)
CALCIUM SERPL-MCNC: 9.4 MG/DL (ref 8.3–10.6)
CHLORIDE SERPL-SCNC: 103 MMOL/L (ref 99–110)
CO2 SERPL-SCNC: 21 MMOL/L (ref 21–32)
CREAT SERPL-MCNC: 0.8 MG/DL (ref 0.9–1.3)
GFR SERPLBLD CREATININE-BSD FMLA CKD-EPI: >90 ML/MIN/{1.73_M2}
GLUCOSE SERPL-MCNC: 96 MG/DL (ref 70–99)
P JIROVECII DNA SPEC QL NAA+PROBE: NOT DETECTED
POTASSIUM SERPL-SCNC: 3.9 MMOL/L (ref 3.5–5.1)
PROCALCITONIN SERPL IA-MCNC: 0.36 NG/ML (ref 0–0.15)
SODIUM SERPL-SCNC: 136 MMOL/L (ref 136–145)
SPECIMEN SOURCE: NORMAL

## 2024-11-20 PROCEDURE — 97530 THERAPEUTIC ACTIVITIES: CPT

## 2024-11-20 PROCEDURE — 6370000000 HC RX 637 (ALT 250 FOR IP): Performed by: INTERNAL MEDICINE

## 2024-11-20 PROCEDURE — 99233 SBSQ HOSP IP/OBS HIGH 50: CPT | Performed by: INTERNAL MEDICINE

## 2024-11-20 PROCEDURE — 85025 COMPLETE CBC W/AUTO DIFF WBC: CPT

## 2024-11-20 PROCEDURE — 94760 N-INVAS EAR/PLS OXIMETRY 1: CPT

## 2024-11-20 PROCEDURE — 94640 AIRWAY INHALATION TREATMENT: CPT

## 2024-11-20 PROCEDURE — 2060000000 HC ICU INTERMEDIATE R&B

## 2024-11-20 PROCEDURE — 2580000003 HC RX 258: Performed by: INTERNAL MEDICINE

## 2024-11-20 PROCEDURE — 80048 BASIC METABOLIC PNL TOTAL CA: CPT

## 2024-11-20 PROCEDURE — 84145 PROCALCITONIN (PCT): CPT

## 2024-11-20 PROCEDURE — 6360000002 HC RX W HCPCS: Performed by: INTERNAL MEDICINE

## 2024-11-20 PROCEDURE — 36415 COLL VENOUS BLD VENIPUNCTURE: CPT

## 2024-11-20 PROCEDURE — 97535 SELF CARE MNGMENT TRAINING: CPT

## 2024-11-20 PROCEDURE — 97110 THERAPEUTIC EXERCISES: CPT

## 2024-11-20 PROCEDURE — 6370000000 HC RX 637 (ALT 250 FOR IP)

## 2024-11-20 RX ORDER — IPRATROPIUM BROMIDE AND ALBUTEROL SULFATE 2.5; .5 MG/3ML; MG/3ML
SOLUTION RESPIRATORY (INHALATION)
Status: COMPLETED
Start: 2024-11-20 | End: 2024-11-20

## 2024-11-20 RX ADMIN — IPRATROPIUM BROMIDE AND ALBUTEROL SULFATE 1 DOSE: 2.5; .5 SOLUTION RESPIRATORY (INHALATION) at 20:32

## 2024-11-20 RX ADMIN — HYDROCHLOROTHIAZIDE 25 MG: 25 TABLET ORAL at 09:15

## 2024-11-20 RX ADMIN — SODIUM CHLORIDE, PRESERVATIVE FREE 10 ML: 5 INJECTION INTRAVENOUS at 09:14

## 2024-11-20 RX ADMIN — Medication 1 TABLET: at 09:15

## 2024-11-20 RX ADMIN — IPRATROPIUM BROMIDE AND ALBUTEROL SULFATE 1 DOSE: .5; 2.5 SOLUTION RESPIRATORY (INHALATION) at 14:06

## 2024-11-20 RX ADMIN — TAMSULOSIN HYDROCHLORIDE 0.4 MG: 0.4 CAPSULE ORAL at 21:47

## 2024-11-20 RX ADMIN — SODIUM CHLORIDE, PRESERVATIVE FREE 10 ML: 5 INJECTION INTRAVENOUS at 21:47

## 2024-11-20 RX ADMIN — LISINOPRIL 40 MG: 40 TABLET ORAL at 09:15

## 2024-11-20 RX ADMIN — LEVOFLOXACIN 750 MG: 500 TABLET, FILM COATED ORAL at 09:15

## 2024-11-20 RX ADMIN — CEFEPIME 2000 MG: 2 INJECTION, POWDER, FOR SOLUTION INTRAVENOUS at 23:42

## 2024-11-20 RX ADMIN — CEFEPIME 2000 MG: 2 INJECTION, POWDER, FOR SOLUTION INTRAVENOUS at 12:12

## 2024-11-20 RX ADMIN — IPRATROPIUM BROMIDE AND ALBUTEROL SULFATE 1 DOSE: 2.5; .5 SOLUTION RESPIRATORY (INHALATION) at 09:02

## 2024-11-20 RX ADMIN — CEFEPIME 2000 MG: 2 INJECTION, POWDER, FOR SOLUTION INTRAVENOUS at 04:28

## 2024-11-20 RX ADMIN — IPRATROPIUM BROMIDE AND ALBUTEROL SULFATE 1 DOSE: 2.5; .5 SOLUTION RESPIRATORY (INHALATION) at 14:06

## 2024-11-20 RX ADMIN — SODIUM CHLORIDE: 9 INJECTION, SOLUTION INTRAVENOUS at 04:23

## 2024-11-20 RX ADMIN — SODIUM CHLORIDE: 9 INJECTION, SOLUTION INTRAVENOUS at 12:09

## 2024-11-20 ASSESSMENT — PAIN SCALES - GENERAL
PAINLEVEL_OUTOF10: 0
PAINLEVEL_OUTOF10: 1

## 2024-11-20 NOTE — PLAN OF CARE
Problem: Discharge Planning  Goal: Discharge to home or other facility with appropriate resources  Outcome: Progressing  Flowsheets (Taken 11/20/2024 0102)  Discharge to home or other facility with appropriate resources:   Identify barriers to discharge with patient and caregiver   Arrange for needed discharge resources and transportation as appropriate   Identify discharge learning needs (meds, wound care, etc)     Problem: Pain  Goal: Verbalizes/displays adequate comfort level or baseline comfort level  Outcome: Progressing  Flowsheets (Taken 11/20/2024 0102)  Verbalizes/displays adequate comfort level or baseline comfort level:   Encourage patient to monitor pain and request assistance   Assess pain using appropriate pain scale   Administer analgesics based on type and severity of pain and evaluate response     Problem: ABCDS Injury Assessment  Goal: Absence of physical injury  Outcome: Progressing  Flowsheets (Taken 11/20/2024 0102)  Absence of Physical Injury: Implement safety measures based on patient assessment     Problem: Safety - Adult  Goal: Free from fall injury  Outcome: Progressing  Flowsheets (Taken 11/20/2024 0102)  Free From Fall Injury: Instruct family/caregiver on patient safety     Problem: Nutrition Deficit:  Goal: Optimize nutritional status  Outcome: Progressing  Flowsheets (Taken 11/20/2024 0102)  Nutrient intake appropriate for improving, restoring, or maintaining nutritional needs:   Assess nutritional status and recommend course of action   Monitor oral intake, labs, and treatment plans

## 2024-11-20 NOTE — CARE COORDINATION
Chart reviewed. RN CM met with patient at bedside. RN CM discussed with the patient regarding recommendations from physical therapy that the patient could use home health care for physical therapy. Patient agreed. RN CM provided freedom choice list to patient. RN CM discussed with the patient of him wanting a walker. RN CM explained to the patient that Physical therapy does not recommend a walker at this time. Therefore, insurance will not pay for the walker. The patient could purchase one outside of insurance if he would like. The patient verbalized understanding. RN CM explained I was working on the shower chair for the patient. Patient would like referrals to be made to [facility], [facility], and [facility] for home care. RN CM will continue to follow. [facility] chair is not covered under insurance the patient would have to pay out of pocket $45.00. RN CM will follow up with patient.   The Plan for Transition of Care is related to the following treatment goals: Strength     The Patient and/or patient representative Dusty Jauregui was provided with a choice of provider and agrees   with the discharge plan. [x] Yes [] No    Freedom of choice list was provided with basic dialogue that supports the patient's individualized plan of care/goals, treatment preferences and shares the quality data associated with the providers. [x] Yes [] No    Electronically signed by Janet Preciado RN on 11/20/2024 at 3:37 PM   [phone]

## 2024-11-20 NOTE — PROGRESS NOTES
Occupational Therapy  Facility/Department: [facility] 5N PROGRESSIVE CARE  Occupational Therapy Treatment Session  Should patient be discharged prior to another treatment session, this note shall serve as the discharge summary.       Name: Dusty Jauregui  : 1970  MRN: [id number]  Date of Service: 2024    Discharge Recommendations:  Home with assist PRN          Patient Diagnosis(es): The primary encounter diagnosis was Sepsis, due to unspecified organism, unspecified whether acute organ dysfunction present ([facility]). Diagnoses of Pneumonia of left lower lobe due to infectious organism, Syncope and collapse, Injury of head, initial encounter, Thrombocytopenia ([facility]), Hypokalemia, BRET (acute kidney injury) ([facility]), and Pulmonary nodule were also pertinent to this visit.  Past Medical History:  has a past medical history of Lupus (systemic lupus erythematosus) ([facility]) and MGUS (monoclonal gammopathy of unknown significance).  Past Surgical History:  has no past surgical history on file.           Assessment  Performance deficits / Impairments: Decreased functional mobility ;Decreased balance;Decreased ADL status;Decreased high-level IADLs;Decreased strength;Decreased endurance  Assessment: Seen in room, agreed to OT.  Pt verbalizing increased endurance this date and requesting to complete higher level LB exercises and have them printed out.  Pt amb without device.  Dressed LB with supervision, toileting independent.  Completed HEP with supervision.  Return to room and left sitting in recliner, all needs met.  Anticipate he will be safe for d/c when medically stable.  No OT needed at home  REQUIRES OT FOLLOW-UP: Yes  Activity Tolerance  Activity Tolerance: Patient Tolerated treatment well  Activity Tolerance Comments: impaired endurance. benefits from brief rest breaks during functional mobility trial d/t fatigue.     Plan  Occupational Therapy Plan  Times Per Week: 3-5x  Current Treatment Recommendations:  Conservation;Fall Prevention Strategies;Home Exercise Program  Education Method: Verbal  Barriers to Learning: None  Education Outcome: Verbalized understanding;Demonstrated understanding        Goals  Short Term Goals  Time Frame for Short Term Goals: prior to D/C: status 11/19 all goals ongoing:  Short Term Goal 1: complete functional mobility and transfers Ind  Short Term Goal 2: complete bathing and dressing Ind  Short Term Goal 3: complete toileting Ind  Short Term Goal 4: complete grooming in stance at sink Ind  Long Term Goals  Time Frame for Long Term Goals : STG=LTG  Patient Goals   Patient goals : return home      Therapy Time   Individual Concurrent Group Co-treatment   Time In 0800         Time Out 0900         Minutes 60         Timed Code Treatment Minutes: 60 Minutes       Lyssa Claudio, OT

## 2024-11-20 NOTE — PROGRESS NOTES
V2.0    [facility] [place]      Name:  Dusty Jauregui /Age/Sex: 1970  (54 y.o. male)   MRN & CSN:  [id number] & [id number] Encounter Date/Time: 2024 11:53 AM EST   Location:  [id number] PCP: Linda Carpenter APRN - CNP     Attending:Ramsey Pereyra MD       Hospital Day: 6    Assessment and Recommendations   Dusty Jauregui is a 54 y.o. male who presents with Sepsis (HCC)      Plan:     Sepsis, due to pneumonia, likely gram-negative pneumonia, patient is immunocompromised, pulmonology, ID consulted and following continue to be on cefepime overall feeling better  gram-negative pneumonia, IV cefepime, also with vancomycin on admission which is discontinued now, Levaquin added per ID.  syncope and collapse with subcutaneous hematoma forehead SCD for DVT prophylaxis echo ordered likely vasovagal patient was sitting at the edge of the bed today while he was in the restroom had some lightheadedness will check orthostatics advised to ambulate more and get slowly from sitting to standing position  Immunodeficiency status with variable monoclonal gammopathy, consulted hematology input  1.9 cm pulmonary note, pulmonology consulted  Thrombocytopenia, no signs of bleeding at this time       Diet ADULT DIET; Regular  ADULT ORAL NUTRITION SUPPLEMENT; Breakfast, Lunch, Dinner; Standard High Calorie/High Protein Oral Supplement   DVT Prophylaxis [] Lovenox, []  Heparin, [] SCDs, [] Ambulation,  [] Eliquis, [] Xarelto  [] Coumadin   Code Status Full Code             Personally reviewed Lab Studies and Imaging       Medical Decision Making:  The following items were considered in medical decision making:  Discussion of patient care with other providers  Reviewed clinical lab tests  Reviewed radiology tests  Reviewed other diagnostic tests/interventions  Independent review of radiologic images  Microbiology cultures and other micro tests reviewed      Subjective:     Chief Complaint: Shortness of

## 2024-11-20 NOTE — PROGRESS NOTES
Infectious Diseases Inpatient Progress Note        CHIEF COMPLAINT:       High fever  Pneumonia  Pancytopenia  Lupus  On CellCept  Thrombocytopenia  CRP elevation  LDH elevation    HISTORY OF PRESENT ILLNESS:  54 y.o. male with a significant history for SLE positive for GOLD and hypocomplementemia pancytopenia followed at rheumatology at [facility] he is currently on mycophenolate is taking 1000 mg daily per recent rheumatology note there was a discussion about changing the medication but patient was not interested patient now admitted to hospital secondary to ongoing fever on and off for the past 5 days not feeling well with cough and some sob not much sputum and labs indicate potassium 3.2 procalcitonin 2.61 ALT 58  WBC 3.2 hemoglobin 9.5 platelets at 735% bandemia urinalysis negative, CT chest with groundglass opacities and subpleural reticular markings noted possible likely early fibrotic changes CT head negative we are consulted for antibiotic recommendations    Interval history: Cough seems to be improving slowly WBC count at 2.4 no bleeding platelets remains low  at 15,  blood cultures so far negative Procal   0.36   Past Medical History:    Past Medical History:   Diagnosis Date    Lupus (systemic lupus erythematosus) (HCC)     MGUS (monoclonal gammopathy of unknown significance)        HTN (hypertension)       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 No pertinent surgical history.    Current Medications:    Outpatient Medications Marked as Taking for the 11/15/24 encounter (Hospital Encounter)   Medication Sig Dispense Refill    baclofen (LIORESAL) 10 MG tablet Take 1 tablet by mouth 3 times daily as needed (Muscle spasm)      BENLYSTA 200 MG/ML SOAJ Inject 200 mg into  stenosis of lumbar region M48.061    Chronic midline low back pain M54.50, G89.29    Sepsis ([facility]) A41.9    Multifocal pneumonia J18.9    Pulmonary nodules R91.8    Pancytopenia ([facility]) D61.818    Systemic lupus erythematosus ([facility]) M32.9    Hypokalemia E87.6    Thrombocytopenia ([facility]) D69.6    Fever and chills R50.9    Pulmonary nodule R91.1    Pneumonia of left lower lobe due to infectious organism J18.9    Severe malnutrition ([facility]) E43        ICD-10-CM    1. Sepsis, due to unspecified organism, unspecified whether acute organ dysfunction present ([facility])  A41.9       2. Pneumonia of left lower lobe due to infectious organism  J18.9       3. Syncope and collapse  R55 Echo (TTE) complete (PRN contrast/bubble/strain/3D)     Echo (TTE) complete (PRN contrast/bubble/strain/3D)      4. Injury of head, initial encounter  S09.90XA       5. Thrombocytopenia ([facility])  D69.6       6. Hypokalemia  E87.6       7. BRET (acute kidney injury) ([facility])  N17.9       8. Pulmonary nodule  R91.1          Admitted with fever chills  Shortness of breath  Cough  Pneumonia  Immunosuppressed from CellCept and SLE  Pancytopenia  Severe thrombocytopenia  Leukopenia  Lymphocytopenia  Rash  Hypocomplementemia  Hypokalemia  Cachexia BMI 20  Procalcitonin elevation  CRP elevation  LDH elevation  Care Everywhere chart review CBC has been abnormal for a long time with pancytopenia and thrombocytopenia    Given immunosuppressed state with pancytopenia autoimmune disease as well as CellCept remains at risk for atypical infection including bacterial as well as viral and fungal infections    Agree with antibiotic coverage will include levofloxacin to cover atypical bacterial infection.  Will check fungal serologies, check for endemic mycosis, check for viral infection like CMV    Fever now seems to be trending down respiratory status slowly improving CMV PCR negative histoplasma antigen negative beta D glucan negative given the clinical response with antibiotics

## 2024-11-20 NOTE — FLOWSHEET NOTE
11/20/24 1126   Vital Signs   Orthostatic B/P and Pulse? Yes   Blood Pressure Lying 124/72   Pulse Lying 111 PER MINUTE   Blood Pressure Standing 85/60   Pulse Standing 121 PER MINUTE     Electronically signed by Yue Thompson RN on 11/20/2024 at 11:40 AM

## 2024-11-20 NOTE — PLAN OF CARE
Problem: Discharge Planning  Goal: Discharge to home or other facility with appropriate resources  11/20/2024 0900 by Yue Thompson RN  Outcome: Progressing  Flowsheets (Taken 11/20/2024 0858)  Discharge to home or other facility with appropriate resources: Identify barriers to discharge with patient and caregiver     Problem: Pain  Goal: Verbalizes/displays adequate comfort level or baseline comfort level  11/20/2024 0900 by Yue Thompson RN  Outcome: Progressing     Problem: ABCDS Injury Assessment  Goal: Absence of physical injury  11/20/2024 0900 by Yue Thompson RN  Outcome: Progressing     Problem: Safety - Adult  Goal: Free from fall injury  11/20/2024 0900 by Yue Thompson RN  Outcome: Progressing     Problem: Nutrition Deficit:  Goal: Optimize nutritional status  11/20/2024 0900 by Yue Thompson RN  Outcome: Progressing

## 2024-11-21 LAB
ANISOCYTOSIS BLD QL SMEAR: ABNORMAL
ASPERGILLUS AB SER QL ID: NOT DETECTED
B DERMAT AB SER QL ID: NOT DETECTED
BASOPHILS # BLD: 0 K/UL (ref 0–0.2)
BASOPHILS NFR BLD: 0 %
COCCIDIOIDES AB SPEC QL ID: NOT DETECTED
DEPRECATED RDW RBC AUTO: 16.5 % (ref 12.4–15.4)
EOSINOPHIL # BLD: 0 K/UL (ref 0–0.6)
EOSINOPHIL NFR BLD: 0 %
H CAPSUL AB TITR SER ID: NOT DETECTED {TITER}
HCT VFR BLD AUTO: 27 % (ref 40.5–52.5)
HGB BLD-MCNC: 8.6 G/DL (ref 13.5–17.5)
HYPOCHROMIA BLD QL SMEAR: ABNORMAL
LYMPHOCYTES # BLD: 0.3 K/UL (ref 1–5.1)
LYMPHOCYTES NFR BLD: 10 %
MCH RBC QN AUTO: 26.1 PG (ref 26–34)
MCHC RBC AUTO-ENTMCNC: 31.8 G/DL (ref 31–36)
MCV RBC AUTO: 82.1 FL (ref 80–100)
METAMYELOCYTES NFR BLD MANUAL: 1 %
MONOCYTES # BLD: 1.2 K/UL (ref 0–1.3)
MONOCYTES NFR BLD: 48 %
NEUTROPHILS # BLD: 1 K/UL (ref 1.7–7.7)
NEUTROPHILS NFR BLD: 27 %
NEUTS BAND NFR BLD MANUAL: 13 % (ref 0–7)
OVALOCYTES BLD QL SMEAR: ABNORMAL
PATH INTERP BLD-IMP: NO
PLATELET # BLD AUTO: 15 K/UL (ref 135–450)
PLATELET BLD QL SMEAR: ABNORMAL
PMV BLD AUTO: 7.4 FL (ref 5–10.5)
POIKILOCYTOSIS BLD QL SMEAR: ABNORMAL
RBC # BLD AUTO: 3.29 M/UL (ref 4.2–5.9)
SCHISTOCYTES BLD QL SMEAR: ABNORMAL
SLIDE REVIEW: ABNORMAL
VARIANT LYMPHS NFR BLD MANUAL: 1 % (ref 0–6)
WBC # BLD AUTO: 2.4 K/UL (ref 4–11)

## 2024-11-21 PROCEDURE — 6360000002 HC RX W HCPCS: Performed by: INTERNAL MEDICINE

## 2024-11-21 PROCEDURE — 97530 THERAPEUTIC ACTIVITIES: CPT

## 2024-11-21 PROCEDURE — 2060000000 HC ICU INTERMEDIATE R&B

## 2024-11-21 PROCEDURE — 6370000000 HC RX 637 (ALT 250 FOR IP): Performed by: INTERNAL MEDICINE

## 2024-11-21 PROCEDURE — 2580000003 HC RX 258: Performed by: INTERNAL MEDICINE

## 2024-11-21 PROCEDURE — 94640 AIRWAY INHALATION TREATMENT: CPT

## 2024-11-21 PROCEDURE — 97116 GAIT TRAINING THERAPY: CPT

## 2024-11-21 PROCEDURE — 97110 THERAPEUTIC EXERCISES: CPT

## 2024-11-21 PROCEDURE — 94760 N-INVAS EAR/PLS OXIMETRY 1: CPT

## 2024-11-21 PROCEDURE — 99254 IP/OBS CNSLTJ NEW/EST MOD 60: CPT | Performed by: INTERNAL MEDICINE

## 2024-11-21 PROCEDURE — 99232 SBSQ HOSP IP/OBS MODERATE 35: CPT | Performed by: INTERNAL MEDICINE

## 2024-11-21 RX ORDER — LISINOPRIL 20 MG/1
20 TABLET ORAL DAILY
Status: DISCONTINUED | OUTPATIENT
Start: 2024-11-22 | End: 2024-11-22 | Stop reason: HOSPADM

## 2024-11-21 RX ORDER — 0.9 % SODIUM CHLORIDE 0.9 %
500 INTRAVENOUS SOLUTION INTRAVENOUS ONCE
Status: COMPLETED | OUTPATIENT
Start: 2024-11-21 | End: 2024-11-21

## 2024-11-21 RX ADMIN — HYDROCHLOROTHIAZIDE 25 MG: 25 TABLET ORAL at 08:47

## 2024-11-21 RX ADMIN — CEFEPIME 2000 MG: 2 INJECTION, POWDER, FOR SOLUTION INTRAVENOUS at 12:03

## 2024-11-21 RX ADMIN — SODIUM CHLORIDE: 9 INJECTION, SOLUTION INTRAVENOUS at 12:02

## 2024-11-21 RX ADMIN — IPRATROPIUM BROMIDE AND ALBUTEROL SULFATE 1 DOSE: 2.5; .5 SOLUTION RESPIRATORY (INHALATION) at 12:13

## 2024-11-21 RX ADMIN — SODIUM CHLORIDE 500 ML: 9 INJECTION, SOLUTION INTRAVENOUS at 11:59

## 2024-11-21 RX ADMIN — IPRATROPIUM BROMIDE AND ALBUTEROL SULFATE 1 DOSE: 2.5; .5 SOLUTION RESPIRATORY (INHALATION) at 21:15

## 2024-11-21 RX ADMIN — Medication 1 TABLET: at 08:48

## 2024-11-21 RX ADMIN — SODIUM CHLORIDE, PRESERVATIVE FREE 10 ML: 5 INJECTION INTRAVENOUS at 08:49

## 2024-11-21 RX ADMIN — LEVOFLOXACIN 750 MG: 500 TABLET, FILM COATED ORAL at 08:46

## 2024-11-21 RX ADMIN — SODIUM CHLORIDE, PRESERVATIVE FREE 10 ML: 5 INJECTION INTRAVENOUS at 19:55

## 2024-11-21 RX ADMIN — LISINOPRIL 40 MG: 40 TABLET ORAL at 08:47

## 2024-11-21 RX ADMIN — TAMSULOSIN HYDROCHLORIDE 0.4 MG: 0.4 CAPSULE ORAL at 19:55

## 2024-11-21 RX ADMIN — IPRATROPIUM BROMIDE AND ALBUTEROL SULFATE 1 DOSE: 2.5; .5 SOLUTION RESPIRATORY (INHALATION) at 08:25

## 2024-11-21 ASSESSMENT — PAIN SCALES - GENERAL
PAINLEVEL_OUTOF10: 0
PAINLEVEL_OUTOF10: 5

## 2024-11-21 NOTE — PLAN OF CARE
Problem: Discharge Planning  Goal: Discharge to home or other facility with appropriate resources  11/21/2024 1025 by Yue Thompson RN  Outcome: Progressing     Problem: Pain  Goal: Verbalizes/displays adequate comfort level or baseline comfort level  11/21/2024 1025 by Yue Thompson RN  Outcome: Progressing  Flowsheets  Taken 11/21/2024 0310 by Troy Beyer RN  Verbalizes/displays adequate comfort level or baseline comfort level:   Encourage patient to monitor pain and request assistance   Assess pain using appropriate pain scale  Taken 11/20/2024 2344 by Troy Beyer RN  Verbalizes/displays adequate comfort level or baseline comfort level: Encourage patient to monitor pain and request assistance     Problem: ABCDS Injury Assessment  Goal: Absence of physical injury  11/21/2024 1025 by Yue Thompson RN  Outcome: Progressing     Problem: Safety - Adult  Goal: Free from fall injury  11/21/2024 1025 by Yue Thompson RN  Outcome: Progressing     Problem: Nutrition Deficit:  Goal: Optimize nutritional status  11/21/2024 1025 by Yue Thompson RN  Outcome: Progressing     Problem: Neurosensory - Adult  Goal: Achieves stable or improved neurological status  11/21/2024 1025 by Yue Thompson RN  Outcome: Progressing  Flowsheets (Taken 11/21/2024 0826)  Achieves stable or improved neurological status: Assess for and report changes in neurological status     Problem: Respiratory - Adult  Goal: Achieves optimal ventilation and oxygenation  11/21/2024 1025 by Yue Thompson RN  Outcome: Progressing  Flowsheets (Taken 11/21/2024 0826)  Achieves optimal ventilation and oxygenation: Assess for changes in respiratory status     Problem: Metabolic/Fluid and Electrolytes - Adult  Goal: Electrolytes maintained within normal limits  11/21/2024 1025 by Yue Thompson RN  Outcome: Progressing     Problem: Hematologic - Adult  Goal: Maintains hematologic stability  11/21/2024 1025 by Yue Thompson RN  Outcome:

## 2024-11-21 NOTE — FLOWSHEET NOTE
11/21/24 1430   Vital Signs   Orthostatic B/P and Pulse? Yes   Blood Pressure Lying 121/81   Pulse Lying 120 PER MINUTE   Blood Pressure Sitting 116/73   Pulse Sitting 128 PER MINUTE     Adminstered 500 cc bolus per MD order. Rechecked patient's bp lying and standing.     Electronically signed by Yue Thompson RN on 11/21/2024 at 2:58 PM

## 2024-11-21 NOTE — PROGRESS NOTES
limits  Cognition  Overall Cognitive Status: WFL  Orientation  Overall Orientation Status: Within Functional Limits               Exercise Treatment: Pt completed 2x10 standing marching in place and heel raises while holding on to counter top to address strength/endurance required for ADLs  Resistive Exercises: Pt provided w/ green resistance band and handout. He completed 1x10 BUE HEP (shoulder press, chest press, tricep ext, and elbow flex). Pt required rest break, mutlipe cues for approriate technique. He was provided w/ handout and edu to ease himself into the exercises to build up his endurance  Education Given To: Patient  Education Provided: Role of Therapy;Plan of Care;Transfer Training;Equipment;Energy Conservation;Fall Prevention Strategies;Home Exercise Program  Education Method: Verbal  Barriers to Learning: None  Education Outcome: Verbalized understanding;Demonstrated understanding    AM-PAC - ADL  AM-PAC Daily Activity - Inpatient   How much help is needed for putting on and taking off regular lower body clothing?: A Little  How much help is needed for bathing (which includes washing, rinsing, drying)?: A Little  How much help is needed for toileting (which includes using toilet, bedpan, or urinal)?: None  How much help is needed for putting on and taking off regular upper body clothing?: None  How much help is needed for taking care of personal grooming?: None  How much help for eating meals?: None  AM-[facility] Inpatient Daily Activity Raw Score: 22  AM-PAC Inpatient ADL T-Scale Score : 47.1  ADL Inpatient CMS 0-100% Score: 25.8  ADL Inpatient CMS G-Code Modifier : CJ    Goals  Short Term Goals  Time Frame for Short Term Goals: prior to D/C: status 11/21 all goals ongoing:  Short Term Goal 1: complete functional mobility and transfers Ind  Short Term Goal 2: complete bathing and dressing Ind  Short Term Goal 3: complete toileting Ind  Short Term Goal 4: complete grooming in stance at sink Ind  Long Term  Goals  Time Frame for Long Term Goals : STG=LTG  Patient Goals   Patient goals : return home      Therapy Time   Individual Concurrent Group Co-treatment   Time In 1456         Time Out 1535         Minutes 39         Timed Code Treatment Minutes: 39 Minutes       Renny Vargas OTR/L [id number]

## 2024-11-21 NOTE — PROGRESS NOTES
Infectious Diseases Inpatient Progress Note        CHIEF COMPLAINT:       High fever  Pneumonia  Pancytopenia  Lupus  On CellCept  Thrombocytopenia  CRP elevation  LDH elevation    HISTORY OF PRESENT ILLNESS:  54 y.o. male with a significant history for SLE positive for GOLD and hypocomplementemia pancytopenia followed at rheumatology at [facility] he is currently on mycophenolate is taking 1000 mg daily per recent rheumatology note there was a discussion about changing the medication but patient was not interested patient now admitted to hospital secondary to ongoing fever on and off for the past 5 days not feeling well with cough and some sob not much sputum and labs indicate potassium 3.2 procalcitonin 2.61 ALT 58  WBC 3.2 hemoglobin 9.5 platelets at 735% bandemia urinalysis negative, CT chest with groundglass opacities and subpleural reticular markings noted possible likely early fibrotic changes CT head negative we are consulted for antibiotic recommendations    Interval history: Cough seems to be improving slowly WBC count at 2.4 no bleeding platelets remains low no bleeding and procal trend down cx Negative         Past Medical History:    Past Medical History:   Diagnosis Date    Lupus (systemic lupus erythematosus) (HCC)     MGUS (monoclonal gammopathy of unknown significance)        HTN (hypertension)       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 No pertinent surgical history.    Current Medications:    Outpatient Medications Marked as Taking for the 11/15/24 encounter (Hospital Encounter)   Medication Sig Dispense Refill    baclofen (LIORESAL) 10 MG tablet Take 1 tablet by mouth 3 times daily as needed (Muscle spasm)      BENLYSTA 200 MG/ML SOAJ Inject 200 mg into  spine.   2. Moderate degenerative disc disease at the C3-C4 level.         CT FACIAL BONES WO CONTRAST   Final Result   1. No acute facial bone fracture.   2. Small hematomas overlie the right frontal bone including the right frontal   sinus without evidence of underlying fracture.         CT CHEST WO CONTRAST   Final Result   1. Ground-glass opacities and subpleural reticular markings are noted at the   lung bases, most prominently seen in the left lower lobe, also present to   lesser degree in the subpleural bilateral upper lobes.  Findings are most   suggestive of an infectious/inflammatory process, less likely early fibrotic   change given lack of significant bronchiectasis.   2. A 1.9 cm nodular right lower lobe opacity demonstrates tiny internal   calcifications, possibly related to prior granulomatous disease.  Recommend   follow-up CT in 3 months.   3. A borderline prominent left lower paratracheal lymph node measures 1.0 cm   and is favored reactive.      RECOMMENDATIONS:   Pathology: Right solid pulmonary nodule measuring 19 mm.Per Fleischner   Society Guidelines, consider a non-contrast Chest CT at 3 months, a PET/CT,   or tissue sampling.These guidelines do not apply to immunocompromised   patients and patients with cancer. Follow up in patients with significant   comorbidities as clinically warranted. For lung cancer screening, adhere to   Lung-RADS guidelines. Reference: Radiology. 2017; 284(1):228-43.         XR CHEST PORTABLE   Final Result   Mild ground-glass opacification in the left lower lung zone.  Atelectasis or   asymmetric edema may be considered.  Developing pneumonitis cannot be   excluded.             All pertinent images and reports for the current Hospitalization were reviewed by me.    IMPRESSION:    Patient Active Problem List   Diagnosis Code    Chest pain R07.9    Syncope and collapse R55    DDD (degenerative disc disease), lumbosacral M51.379    Foraminal stenosis of lumbar region

## 2024-11-21 NOTE — PROGRESS NOTES
Physical Therapy  Facility/Department: [address] PROGRESSIVE CARE  Physical Therapy Treatment Note    Name: Dusty Jauregui  : 1970  MRN: [id number]  Date of Service: 2024    Discharge Recommendations:  Continue to assess pending progress, Home with assist PRN   PT Equipment Recommendations  Other: None anticipated.      Dusty Jauregui scored a 20/24 on the AM-PAC short mobility form. Current research shows that an AM-PAC score of 18 or greater is typically associated with a discharge to the patient's home setting. Based on the patient's AM-PAC score and their current functional mobility deficits, it is recommended that the patient have 2-3 sessions per week of Physical Therapy at d/c to increase the patient's independence.  At this time, this patient demonstrates the endurance and safety to discharge home with Home PT Evaluation  and a follow up treatment frequency of 2-3x/wk.  Please see assessment section for further patient specific details.    If patient discharges prior to next session this note will serve as a discharge summary.  Please see below for the latest assessment towards goals.       Assessment  Body Structures, Functions, Activity Limitations Requiring Skilled Therapeutic Intervention: Decreased functional mobility ;Decreased endurance;Decreased balance  Assessment: 55 y/o male admit 11/15/204 with Syncope/Collapse, Acute Respiratory, Sepsis. CT Head/C-Spine : negative. PMH as noted including Lupus, MGUS.  PTA pt living alone in apt setting with few steps to enter; independent daily care and functional mobility.  Pt currently alvin transfers/amb functional distances SBA; reports weak/limited endurance although improving.  Gait slow/slight guarded improving as progress,  although  no specific LOB.  Cont  Stand LE Strength Exs.   At this time, anticipate d/c home.  Pt receptive to Home PT Services upon d/c.  Will cont to monitor pt's progress.  History: 55 y/o male admit 11/15/204 with

## 2024-11-21 NOTE — PROGRESS NOTES
V2.0    [facility] Progress Note      Name:  Dusty Jauregui /Age/Sex: 1970  (54 y.o. male)   MRN & CSN:  [id number] & [id number] Encounter Date/Time: 2024 1:22 PM EST   Location:  [id number] PCP: Linda Carpenter APRN - CNP     Attending:Ramsey Pereyra MD       Hospital Day: 7    Assessment and Recommendations   Dusyt Jauregui is a 54 y.o. male who presents with Sepsis (HCC)      Plan:   Sepsis, due to pneumonia, likely gram-negative pneumonia, patient is immunocompromised, pulmonology, ID consulted and following continue to be on cefepime overall feeling better  gram-negative pneumonia, IV cefepime, also with vancomycin on admission which is discontinued now, Levaquin added per ID.  syncope and collapse with subcutaneous hematoma forehead and patient orthostatic, will consult cardiology, HCTZ discontinued at this time and decrease lisinopril dose pending further recommendation from cardiology immunodeficiency status with variable monoclonal gammopathy, consulted hematology input  1.9 cm pulmonary note, pulmonology consulted  Thrombocytopenia, no signs of bleeding at this time        Diet ADULT DIET; Regular  ADULT ORAL NUTRITION SUPPLEMENT; Breakfast, Lunch, Dinner; Standard High Calorie/High Protein Oral Supplement   DVT Prophylaxis [] Lovenox, []  Heparin, [] SCDs, [] Ambulation,  [] Eliquis, [] Xarelto  [] Coumadin   Code Status Full Code             Personally reviewed Lab Studies and Imaging       Medical Decision Making:  The following items were considered in medical decision making:  Discussion of patient care with other providers  Reviewed clinical lab tests  Reviewed radiology tests  Reviewed other diagnostic tests/interventions  Independent review of radiologic images  Microbiology cultures and other micro tests reviewed      Subjective:     Chief Complaint: Shortness of breath    Dusty Jauregui is a 54 y.o. male who presents with shortness of breath cough had syncopal

## 2024-11-21 NOTE — PLAN OF CARE
Problem: Discharge Planning  Goal: Discharge to home or other facility with appropriate resources  11/20/2024 2207 by Troy Beyer RN  Outcome: Progressing  Flowsheets (Taken 11/20/2024 2157)  Discharge to home or other facility with appropriate resources: Identify barriers to discharge with patient and caregiver     Problem: Pain  Goal: Verbalizes/displays adequate comfort level or baseline comfort level  11/20/2024 2207 by Troy Beyer RN  Outcome: Progressing  Flowsheets (Taken 11/20/2024 2207)  Verbalizes/displays adequate comfort level or baseline comfort level:   Encourage patient to monitor pain and request assistance   Assess pain using appropriate pain scale   Implement non-pharmacological measures as appropriate and evaluate response   Administer analgesics based on type and severity of pain and evaluate response   Consider cultural and social influences on pain and pain management   Notify Licensed Independent Practitioner if interventions unsuccessful or patient reports new pain     Problem: ABCDS Injury Assessment  Goal: Absence of physical injury  11/20/2024 2207 by Troy Beyer RN  Outcome: Progressing  Flowsheets (Taken 11/20/2024 0102 by Khushboo Callaway RN)  Absence of Physical Injury: Implement safety measures based on patient assessment     Problem: Safety - Adult  Goal: Free from fall injury  11/20/2024 2207 by Troy Beyer RN  Outcome: Progressing  Flowsheets (Taken 11/20/2024 2207)  Free From Fall Injury: Instruct family/caregiver on patient safety     Problem: Nutrition Deficit:  Goal: Optimize nutritional status  11/20/2024 2207 by Troy Beyer RN  Outcome: Progressing  Flowsheets (Taken 11/20/2024 0102 by Khushboo Callaway RN)  Nutrient intake appropriate for improving, restoring, or maintaining nutritional needs:   Assess nutritional status and recommend course of action   Monitor oral intake, labs, and treatment plans     Problem: Neurosensory - Adult  Goal: Achieves stable

## 2024-11-21 NOTE — CONSULTS
[facility]    Dusty Jauregui  1970 November 21, 2024    Reason for Consult: Orthyostatic Hypotension    CC: \"Pain in my back\"    HPI:  The patient is 54 y.o. male with a past medical history significant for a reported diagnosis of SLE, MGUS and essential hypertension who presented to the hospital with . I was asked to see him for orthostatic hypotension.    He states that he came in with pain in his back and feeling dehydrated. He says that he has not been eating well lately due to his SLE and finances. He may only eat once a day. On some days, he may only eat 2 apples and drink some water. His appetite has been poor for almost a year.    He used to weight 164lbs a year ago. He says he weighs around 138lbs now.     He feels like there is something going on with hisi heart. He relates that when he gets up and walks around he will have \"tightness in my breath\" and feelings of his heart racing.      Telemetry reveals sinus tachycardia. He thinks that the Cefepime is causing his heart to race.     He takes lisinopril and HCTZ as an outpatient or his hypertension. He says that he takes Cellcept and an injection once weekly for his SLE.     He states that he had a subjective fever when he came into the hospital.     Review of Systems:  Constitutional: Positive for fatigue and weakness. No night sweats or fever.   HEENT: No new vision difficulties or ringing in the ears.  Respiratory: No new SOB, PND, orthopnea or cough.   Cardiovascular: See HPI   GI: No n/v, diarrhea, constipation, abdominal pain or changes in bowel habits.  No melena, no hematochezia  : No urinary frequency, urgency, incontinence, hematuria or dysuria.  Skin: No cyanosis or skin lesions.  Musculoskeletal: No new muscle or joint pain.  Neurological: No syncope or TIA-like symptoms.  Psychiatric: No anxiety, insomnia or depression     Past Medical History:   Diagnosis Date    Lupus (systemic lupus erythematosus)  (HCC)     MGUS (monoclonal gammopathy of unknown significance)      Family History:  Reviewed. No pertinent family history of earlky CAD or sudden cardiac death.    Social History     Tobacco Use    Smoking status: Never    Smokeless tobacco: Never   Vaping Use    Vaping status: Never Used   Substance Use Topics    Alcohol use: Yes    Drug use: Not Currently       Allergies   Allergen Reactions    Bactrim [Sulfamethoxazole-Trimethoprim]      Lip swelling     Current Facility-Administered Medications   Medication Dose Route Frequency Provider Last Rate Last Admin    [START ON 11/22/2024] lisinopril (PRINIVIL;ZESTRIL) tablet 20 mg  20 mg Oral Daily Ramsey Pereyra MD        levoFLOXacin (LEVAQUIN) tablet 750 mg  750 mg Oral Daily Owen Torres MD   750 mg at 11/21/24 0846    cetirizine (ZYRTEC) tablet 5 mg  5 mg Oral Q6H PRN Ramsey Pereyra MD   5 mg at 11/19/24 1214    baclofen (LIORESAL) tablet 10 mg  10 mg Oral TID PRN Ramsey Pereyra MD   10 mg at 11/15/24 2114    therapeutic multivitamin-minerals 1 tablet  1 tablet Oral Daily Ramsey Pereyra MD   1 tablet at 11/21/24 0848    [Held by provider] mycophenolate (CELLCEPT) capsule 1,000 mg  1,000 mg Oral BID Ramsey Pereyra MD   1,000 mg at 11/16/24 0958    tamsulosin (FLOMAX) capsule 0.4 mg  0.4 mg Oral Nightly Ramsey Pereyra MD   0.4 mg at 11/20/24 2147    sodium chloride flush 0.9 % injection 5-40 mL  5-40 mL IntraVENous 2 times per day Ramsey Pereyra MD   10 mL at 11/21/24 0849    sodium chloride flush 0.9 % injection 5-40 mL  5-40 mL IntraVENous PRN Ramsey Pereyra MD        0.9 % sodium chloride infusion   IntraVENous PRN Ramsey Pereyra MD 5 mL/hr at 11/21/24 1202 New Bag at 11/21/24 1202    ondansetron (ZOFRAN-ODT) disintegrating tablet 4 mg  4 mg Oral Q8H PRN Ramsey Pereyra MD        Or    ondansetron (ZOFRAN) injection 4 mg  4 mg IntraVENous Q6H PRN Ramsey Pereyra MD        polyethylene glycol (GLYCOLAX)

## 2024-11-22 VITALS
BODY MASS INDEX: 21.06 KG/M2 | WEIGHT: 142.2 LBS | HEART RATE: 128 BPM | TEMPERATURE: 98.9 F | OXYGEN SATURATION: 98 % | SYSTOLIC BLOOD PRESSURE: 114 MMHG | HEIGHT: 69 IN | DIASTOLIC BLOOD PRESSURE: 57 MMHG | RESPIRATION RATE: 18 BRPM

## 2024-11-22 LAB
ALBUMIN SERPL-MCNC: 3.5 G/DL (ref 3.4–5)
ALBUMIN/GLOB SERPL: 0.8 {RATIO} (ref 1.1–2.2)
ALP SERPL-CCNC: 73 U/L (ref 40–129)
ALT SERPL-CCNC: 110 U/L (ref 10–40)
ANION GAP SERPL CALCULATED.3IONS-SCNC: 9 MMOL/L (ref 3–16)
AST SERPL-CCNC: 78 U/L (ref 15–37)
BILIRUB SERPL-MCNC: 0.4 MG/DL (ref 0–1)
BUN SERPL-MCNC: 18 MG/DL (ref 7–20)
CALCIUM SERPL-MCNC: 9.4 MG/DL (ref 8.3–10.6)
CHLORIDE SERPL-SCNC: 101 MMOL/L (ref 99–110)
CO2 SERPL-SCNC: 25 MMOL/L (ref 21–32)
CREAT SERPL-MCNC: 0.8 MG/DL (ref 0.9–1.3)
GFR SERPLBLD CREATININE-BSD FMLA CKD-EPI: >90 ML/MIN/{1.73_M2}
GLUCOSE SERPL-MCNC: 111 MG/DL (ref 70–99)
POTASSIUM SERPL-SCNC: 4 MMOL/L (ref 3.5–5.1)
PROT SERPL-MCNC: 7.8 G/DL (ref 6.4–8.2)
SODIUM SERPL-SCNC: 135 MMOL/L (ref 136–145)

## 2024-11-22 PROCEDURE — 2580000003 HC RX 258: Performed by: INTERNAL MEDICINE

## 2024-11-22 PROCEDURE — 99232 SBSQ HOSP IP/OBS MODERATE 35: CPT | Performed by: INTERNAL MEDICINE

## 2024-11-22 PROCEDURE — 97530 THERAPEUTIC ACTIVITIES: CPT

## 2024-11-22 PROCEDURE — 80053 COMPREHEN METABOLIC PANEL: CPT

## 2024-11-22 PROCEDURE — 97116 GAIT TRAINING THERAPY: CPT

## 2024-11-22 PROCEDURE — 6370000000 HC RX 637 (ALT 250 FOR IP): Performed by: INTERNAL MEDICINE

## 2024-11-22 PROCEDURE — 94640 AIRWAY INHALATION TREATMENT: CPT

## 2024-11-22 PROCEDURE — 97110 THERAPEUTIC EXERCISES: CPT

## 2024-11-22 PROCEDURE — 85025 COMPLETE CBC W/AUTO DIFF WBC: CPT

## 2024-11-22 PROCEDURE — 36415 COLL VENOUS BLD VENIPUNCTURE: CPT

## 2024-11-22 PROCEDURE — 94760 N-INVAS EAR/PLS OXIMETRY 1: CPT

## 2024-11-22 RX ORDER — LEVOFLOXACIN 750 MG/1
750 TABLET, FILM COATED ORAL DAILY
Qty: 3 TABLET | Refills: 0 | Status: SHIPPED | OUTPATIENT
Start: 2024-11-23 | End: 2024-11-22

## 2024-11-22 RX ORDER — LISINOPRIL 20 MG/1
20 TABLET ORAL DAILY
Qty: 30 TABLET | Refills: 0 | Status: SHIPPED | OUTPATIENT
Start: 2024-11-23

## 2024-11-22 RX ORDER — ALBUTEROL SULFATE 90 UG/1
1 INHALANT RESPIRATORY (INHALATION) 4 TIMES DAILY PRN
Qty: 18 G | Refills: 0 | Status: SHIPPED | OUTPATIENT
Start: 2024-11-22

## 2024-11-22 RX ORDER — LISINOPRIL 20 MG/1
20 TABLET ORAL DAILY
Qty: 30 TABLET | Refills: 0
Start: 2024-11-23 | End: 2024-11-22

## 2024-11-22 RX ORDER — LEVOFLOXACIN 750 MG/1
750 TABLET, FILM COATED ORAL DAILY
Qty: 3 TABLET | Refills: 0 | Status: SHIPPED | OUTPATIENT
Start: 2024-11-23 | End: 2024-11-26

## 2024-11-22 RX ORDER — LEVOFLOXACIN 750 MG/1
750 TABLET, FILM COATED ORAL DAILY
Qty: 6 TABLET | Refills: 0
Start: 2024-11-23 | End: 2024-11-22

## 2024-11-22 RX ADMIN — IPRATROPIUM BROMIDE AND ALBUTEROL SULFATE 1 DOSE: 2.5; .5 SOLUTION RESPIRATORY (INHALATION) at 12:28

## 2024-11-22 RX ADMIN — LEVOFLOXACIN 750 MG: 500 TABLET, FILM COATED ORAL at 08:19

## 2024-11-22 RX ADMIN — SODIUM CHLORIDE, PRESERVATIVE FREE 10 ML: 5 INJECTION INTRAVENOUS at 08:20

## 2024-11-22 RX ADMIN — IPRATROPIUM BROMIDE AND ALBUTEROL SULFATE 1 DOSE: 2.5; .5 SOLUTION RESPIRATORY (INHALATION) at 08:37

## 2024-11-22 RX ADMIN — Medication 1 TABLET: at 08:19

## 2024-11-22 RX ADMIN — LISINOPRIL 20 MG: 20 TABLET ORAL at 08:19

## 2024-11-22 ASSESSMENT — PAIN SCALES - GENERAL: PAINLEVEL_OUTOF10: 0

## 2024-11-22 NOTE — PLAN OF CARE
Problem: Discharge Planning  Goal: Discharge to home or other facility with appropriate resources  11/21/2024 2327 by Troy Beyer RN  Outcome: Progressing  Flowsheets (Taken 11/21/2024 1957)  Discharge to home or other facility with appropriate resources: Identify barriers to discharge with patient and caregiver     Problem: Pain  Goal: Verbalizes/displays adequate comfort level or baseline comfort level  11/21/2024 2327 by Troy Beyer RN  Outcome: Progressing  Flowsheets (Taken 11/21/2024 2258)  Verbalizes/displays adequate comfort level or baseline comfort level:   Encourage patient to monitor pain and request assistance   Assess pain using appropriate pain scale   Administer analgesics based on type and severity of pain and evaluate response   Implement non-pharmacological measures as appropriate and evaluate response   Consider cultural and social influences on pain and pain management   Notify Licensed Independent Practitioner if interventions unsuccessful or patient reports new pain     Problem: ABCDS Injury Assessment  Goal: Absence of physical injury  11/21/2024 2327 by Troy Beyer RN  Outcome: Progressing     Problem: Safety - Adult  Goal: Free from fall injury  11/21/2024 2327 by Troy Beyer RN  Outcome: Progressing     Problem: Nutrition Deficit:  Goal: Optimize nutritional status  11/21/2024 2327 by Troy Beyer RN  Outcome: Progressing     Problem: Neurosensory - Adult  Goal: Achieves stable or improved neurological status  11/21/2024 2327 by Troy Beyer RN  Outcome: Progressing  Flowsheets (Taken 11/21/2024 1957)  Achieves stable or improved neurological status: Assess for and report changes in neurological status     Problem: Respiratory - Adult  Goal: Achieves optimal ventilation and oxygenation  11/21/2024 2327 by Troy Beyer RN  Outcome: Progressing  Flowsheets (Taken 11/21/2024 1957)  Achieves optimal ventilation and oxygenation:   Assess for changes in mentation and behavior    Assess for changes in respiratory status   Position to facilitate oxygenation and minimize respiratory effort     Problem: Metabolic/Fluid and Electrolytes - Adult  Goal: Electrolytes maintained within normal limits  11/21/2024 2327 by Troy Beyer RN  Outcome: Progressing  Flowsheets (Taken 11/21/2024 1957)  Electrolytes maintained within normal limits: Monitor labs and assess patient for signs and symptoms of electrolyte imbalances     Problem: Hematologic - Adult  Goal: Maintains hematologic stability  11/21/2024 2327 by Troy Beyer RN  Outcome: Progressing  Flowsheets (Taken 11/21/2024 1957)  Maintains hematologic stability: Assess for signs and symptoms of bleeding or hemorrhage     Problem: Musculoskeletal - Adult  Goal: Return mobility to safest level of function  Outcome: Progressing

## 2024-11-22 NOTE — DISCHARGE INSTR - COC
Continuity of Care Form    Patient Name: Dusty Jauregui   :  1970  MRN:  [id number]    Admit date:  11/15/2024  Discharge date:  2024    Code Status Order: Full Code   Advance Directives:   Advance Care Flowsheet Documentation             Admitting Physician:  Ramsey Pereyra MD  PCP: Linda Carpenter, APRN - CNP    Discharging Nurse: JANINA Mirza  Discharging Hospital Unit/Room#: [id number]-6104/5277-01  Discharging Unit Phone Number: [phone]    Emergency Contact:   Extended Emergency Contact Information  Primary Emergency Contact: Laney Jauregui  Home Phone: [phone]  Relation: Parent  Secondary Emergency Contact: Darlene Schmidt  Mobile Phone: [phone]  Relation: Child    Past Surgical History:  History reviewed. No pertinent surgical history.    Immunization History:     There is no immunization history on file for this patient.    Active Problems:  Patient Active Problem List   Diagnosis Code    Chest pain R07.9    Syncope and collapse R55    DDD (degenerative disc disease), lumbosacral M51.379    Foraminal stenosis of lumbar region M48.061    Chronic midline low back pain M54.50, G89.29    Sepsis (HCC) A41.9    Multifocal pneumonia J18.9    Pulmonary nodules R91.8    Pancytopenia (HCC) D61.818    Systemic lupus erythematosus (HCC) M32.9    Hypokalemia E87.6    Thrombocytopenia (HCC) D69.6    Fever and chills R50.9    Pulmonary nodule R91.1    Pneumonia of left lower lobe due to infectious organism J18.9    Severe malnutrition (HCC) E43       Isolation/Infection:   Isolation            No Isolation          Patient Infection Status       None to display                     Nurse Assessment:  Last Vital Signs: /68   Pulse (!) 104   Temp 98 °F (36.7 °C) (Oral)   Resp 19   Ht 1.753 m (5' 9.02\")   Wt 64.5 kg (142 lb 3.2 oz)   SpO2 100%   BMI 20.99 kg/m²     Last documented pain score (0-10 scale): Pain Level: 0  Last Weight:   Wt Readings from Last 1 Encounters:   24

## 2024-11-22 NOTE — PROGRESS NOTES
Discharge orders acknowledged by RN . Discharge teaching completed with pt. AVS reviewed and all questions answered. Medication regimen reviewed and pt understands schedule. E-scripts sent to pt RX. Follow up appointments also reviewed with pt and resources given for discharge. IV removed. Portable monitor removed from pt. Pt vitals WNL. Pt discharged with all belongings to self. Pt transported off of unit via wheelchair. No complications.    Electronically signed by Yuki Terry RN on 11/22/2024 at 4:13 PM

## 2024-11-22 NOTE — PROGRESS NOTES
0-100% Score: 20.91  Mobility Inpatient CMS G-Code Modifier : CJ         Goals  Short Term Goals  Time Frame for Short Term Goals: Upon d/c acute care setting.  Short Term Goal 1: Bed Mob Independent.  11/22 Goal met.  Short Term Goal 2: Transfers Independent. 11/22 Goal met.  Short Term Goal 3: Amb 150-200' Independent. 11/22 Amb 500' Supervision only.  Short Term Goal 4: Stair Negotiation as approp.  Patient Goals   Patient Goals : Get stronger, return home.       Education  Patient Education  Education Given To: Patient  Education Provided Comments: Role of PT, POC, Need to call for assist, Functional Activities, Exs.  Education Method: Verbal  Barriers to Learning: None  Education Outcome: Verbalized understanding;Continued education needed      Therapy Time   Individual Concurrent Group Co-treatment   Time In 1030         Time Out 1110         Minutes 40                 Nakia Marrufo, PT

## 2024-11-22 NOTE — PROGRESS NOTES
Occupational Therapy  [facility]  Occupational Therapy Daily Treatment Note  This note to serve as OT d/c summary if pt is d/c-ed from hospital prior to next OT session.      Name: Dusty Jauregui  : 1970  MRN: [id number]  Date of Service: 2024    Discharge Recommendations:  Home with assist PRN  OT Equipment Recommendations  ADL Assistive Devices: Shower Chair with back       Patient Diagnosis(es): The primary encounter diagnosis was Sepsis, due to unspecified organism, unspecified whether acute organ dysfunction present ([facility]). Diagnoses of Pneumonia of left lower lobe due to infectious organism, Syncope and collapse, Injury of head, initial encounter, Thrombocytopenia ([facility]), Hypokalemia, BRET (acute kidney injury) ([facility]), and Pulmonary nodule were also pertinent to this visit.  Past Medical History:  has a past medical history of Lupus (systemic lupus erythematosus) ([facility]) and MGUS (monoclonal gammopathy of unknown significance).  Past Surgical History:  has no past surgical history on file.           Assessment  Performance deficits / Impairments: Decreased functional mobility ;Decreased balance;Decreased ADL status;Decreased high-level IADLs;Decreased strength;Decreased endurance  Assessment: Pt tolerated session well.  He completed mobility and transfers with independence.  Pt reports he is able to manage his socks and jessica pants.  Pt to be discharged home and will follow up with physician after discharge.  Pt educated on safety if pt is feeling dizzy to sit down or not walk until after dizziness subsides.  Do not anticipate pt will require further OT after discharge.  Prognosis: Good  Activity Tolerance  Activity Tolerance: Patient Tolerated treatment well     Plan  Occupational Therapy Plan  Times Per Week: 3-5x  Current Treatment Recommendations: Strengthening, Balance training, Functional mobility training, Endurance training, Self-Care / ADL, Patient/Caregiver  grooming?: None  How much help for eating meals?: None  AM-PAC Inpatient Daily Activity Raw Score: 22  AM-PAC Inpatient ADL T-Scale Score : 47.1  ADL Inpatient CMS 0-100% Score: 25.8  ADL Inpatient CMS G-Code Modifier : CJ      Goals  Short Term Goals  Time Frame for Short Term Goals: prior to D/C: status 11/21 all goals ongoing:  Short Term Goal 1: complete functional mobility and transfers Ind  Short Term Goal 2: complete bathing and dressing Ind  Short Term Goal 3: complete toileting Ind  Short Term Goal 4: complete grooming in stance at sink Ind  Long Term Goals  Time Frame for Long Term Goals : STG=LTG  Patient Goals   Patient goals : return home      Therapy Time   Individual Concurrent Group Co-treatment   Time In 1330         Time Out 1410         Minutes 40               KEISHA Talbert/SUSAN [id number]

## 2024-11-22 NOTE — CARE COORDINATION
Case Management Discharge Note          Date / Time of Note: 11/22/2024 2:33 PM                  Patient Name: Dusty Jauregui   YOB: 1970  Diagnosis: Syncope and collapse [R55]  Hypokalemia [E87.6]  Thrombocytopenia (HCC) [D69.6]  BRET (acute kidney injury) (HCC) [N17.9]  Injury of head, initial encounter [S09.90XA]  Sepsis (HCC) [A41.9]  Pneumonia of left lower lobe due to infectious organism [J18.9]  Sepsis, due to unspecified organism, unspecified whether acute organ dysfunction present (HCC) [A41.9]   Date / Time: 11/15/2024 10:34 AM    Financial:  Payor: [employer] [place] / Plan: [employer] [place] / Product Type: *No Product type* /      Pharmacy:    [facility] [id number] [facility] [address] [place] [address] [phone] -  [phone]  [address] [place] [address]  Phone: [phone] Fax: [phone]      Assistance purchasing medications?: Potential Assistance Purchasing Medications: No  Assistance provided by Case Management: None at this time    DISCHARGE Disposition: Home with Home Health Care    Nursing Facility:     LOC at discharge: Not Applicable  SARA Completed: Yes    Notification completed in [facility]/PAS?:  Not Applicable    Home Care:  Home Care ordered at discharge: Yes  Home Care Agency:  [facility]  Phone: [phone]  Fax: [phone]    [facility]  [address] [place] [address]  Phone: [phone]  Fax: [phone]    Orders faxed: Ivis Nichols will pull orders from [facility] for occupational and physical therapy     Referrals made at DISCHARGE for outpatient continued care:  Not Applicable    Transportation:  Transportation PLAN for discharge: family   Mode of Transport: Private Car      IMM Completed:   Not Indicated    Additional CM Notes: Discharge order noted. Patient to home with home health care through [facility] for physical and occupational therapy. Patient will need to

## 2024-11-22 NOTE — PROGRESS NOTES
Comprehensive Nutrition Assessment    Type and Reason for Visit:  Reassess    Nutrition Recommendations/Plan:   Continue regular diet  Continue Ensure High calorie/High protein TID with meals  Continue to monitor patient's PO intake for any needed changes     Malnutrition Assessment:  Malnutrition Status:  Moderate malnutrition (11/22/24 1402)    Context:  Acute Illness     Findings of the 6 clinical characteristics of malnutrition:  Energy Intake:  No decrease in energy intake  Weight Loss:  Greater than 2% over 1 week     Body Fat Loss:  Moderate body fat loss Orbital, Buccal region   Muscle Mass Loss:  Moderate muscle mass loss Temples (temporalis), Clavicles (pectoralis & deltoids)  Fluid Accumulation:  No fluid accumulation     Strength:  Not Performed    Nutrition Assessment:    Follow up- per progress notes patient's cough in improving as well as overall health. During assessment patient reports he is \"doing great\". States that he is enjoying the 3 meals per day served at the hospital and likes that he is receiving double portions. He would like to continue receiving double portions since he wants to gain weight. States he is drinking Ensure. [employer] coupons were provided to patient. Patient is being discharged today. Will continue to monitor patient's PO intake for any needed changes.    Nutrition Related Findings:    No new labs reported at this time. Oriented x 4. No edema. Last BM on 11/20/2024. Wound Type: None       Current Nutrition Intake & Therapies:    Average Meal Intake: %  Average Supplements Intake: 51-75%, %  ADULT DIET; Regular  ADULT ORAL NUTRITION SUPPLEMENT; Breakfast, Lunch, Dinner; Standard High Calorie/High Protein Oral Supplement    Anthropometric Measures:  Height: 175.3 cm (5' 9.02\")  Ideal Body Weight (IBW): 160 lbs (73 kg)       Current Body Weight: 64.5 kg (142 lb 3.2 oz), 88.9 % IBW. Weight Source: Standing scale  Current BMI (kg/m2): 21           Weight Adjustment

## 2024-11-22 NOTE — PROGRESS NOTES
Infectious Diseases Inpatient Progress Note        CHIEF COMPLAINT:       High fever  Pneumonia  Pancytopenia  Lupus  On CellCept  Thrombocytopenia  CRP elevation  LDH elevation    HISTORY OF PRESENT ILLNESS:  54 y.o. male with a significant history for SLE positive for GOLD and hypocomplementemia pancytopenia followed at rheumatology at [facility] he is currently on mycophenolate is taking 1000 mg daily per recent rheumatology note there was a discussion about changing the medication but patient was not interested patient now admitted to hospital secondary to ongoing fever on and off for the past 5 days not feeling well with cough and some sob not much sputum and labs indicate potassium 3.2 procalcitonin 2.61 ALT 58  WBC 3.2 hemoglobin 9.5 platelets at 735% bandemia urinalysis negative, CT chest with groundglass opacities and subpleural reticular markings noted possible likely early fibrotic changes CT head negative we are consulted for antibiotic recommendations    Interval history: Cough seems to be improving slowly WBC count at 2.4 no bleeding no fevers and feeling better  no chills he will need follow up with UC after d/c       Past Medical History:    Past Medical History:   Diagnosis Date    Lupus (systemic lupus erythematosus) (HCC)     MGUS (monoclonal gammopathy of unknown significance)        HTN (hypertension)       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 No pertinent surgical history.    Current Medications:    Outpatient Medications Marked as Taking for the 11/15/24 encounter (Hospital Encounter)   Medication Sig Dispense Refill    [START ON 11/23/2024] lisinopril (PRINIVIL;ZESTRIL) 20 MG tablet Take 1 tablet by mouth daily 30 tablet 0    [START ON 11/23/2024]

## 2024-11-22 NOTE — DISCHARGE SUMMARY
Hospital Medicine Discharge Summary    Patient ID: Dusty Jauregui      Patient's PCP: Linda Carpenter, APRN - CNP    Admit Date: 11/15/2024     Discharge Date:   11/22/2024    Admitting Provider: Ramsey Pereyra MD     Discharge Provider: Ramsey Pereyra MD     Discharge Diagnoses:       Active Hospital Problems    Diagnosis     Severe malnutrition (HCC) [E43]     Multifocal pneumonia [J18.9]     Pulmonary nodules [R91.8]     Pancytopenia (HCC) [D61.818]     Systemic lupus erythematosus (HCC) [M32.9]     Hypokalemia [E87.6]     Thrombocytopenia (HCC) [D69.6]     Fever and chills [R50.9]     Pulmonary nodule [R91.1]     Pneumonia of left lower lobe due to infectious organism [J18.9]     Sepsis (HCC) [A41.9]        The patient was seen and examined on day of discharge and this discharge summary is in conjunction with any daily progress note from day of discharge.    Hospital Course:     From HPI:\"54 y.o. male who presented to [facility] with weakness shortness of breath and cough, cough and shortness of breath progressively worsening over the last 2 weeks significantly worse in the last 2 to 3 days associated with fever chills body aches denies abdominal pain nausea vomiting or diarrhea.  Discussed with ED provider agree with this admission.  To note that patient had syncopal episode in ED CT scan negative for intracranial bleed \"          Plan:   Sepsis, due to pneumonia, likely gram-negative pneumonia, patient is immunocompromised, pulmonology, ID consulted and following continue to be on cefepime overall feeling better discussed with infectious disease and recommended to discharge home on 3 more days of Levaquin discussed with patient and asked to hold CellCept till he will see his PCP next Monday to be readdressed patient verbalized understanding  gram-negative pneumonia, IV cefepime, also with vancomycin on admission which is discontinued now, Levaquin added per ID.  3 more days of Levaquin on  11/22/2024 09:39 AM    CREATININE 0.8 11/22/2024 09:39 AM    CALCIUM 9.4 11/22/2024 09:39 AM    PHOS 2.1 11/18/2024 04:32 AM         Significant Diagnostic Studies    Radiology:   CT HEAD WO CONTRAST   Final Result   No evidence of acute intracranial abnormality.         CT CERVICAL SPINE WO CONTRAST   Final Result   1. No acute findings in the cervical spine.   2. Moderate degenerative disc disease at the C3-C4 level.         CT FACIAL BONES WO CONTRAST   Final Result   1. No acute facial bone fracture.   2. Small hematomas overlie the right frontal bone including the right frontal   sinus without evidence of underlying fracture.         CT CHEST WO CONTRAST   Final Result   1. Ground-glass opacities and subpleural reticular markings are noted at the   lung bases, most prominently seen in the left lower lobe, also present to   lesser degree in the subpleural bilateral upper lobes.  Findings are most   suggestive of an infectious/inflammatory process, less likely early fibrotic   change given lack of significant bronchiectasis.   2. A 1.9 cm nodular right lower lobe opacity demonstrates tiny internal   calcifications, possibly related to prior granulomatous disease.  Recommend   follow-up CT in 3 months.   3. A borderline prominent left lower paratracheal lymph node measures 1.0 cm   and is favored reactive.      RECOMMENDATIONS:   Pathology: Right solid pulmonary nodule measuring 19 mm.Per Fleischner   Society Guidelines, consider a non-contrast Chest CT at 3 months, a PET/CT,   or tissue sampling.These guidelines do not apply to immunocompromised   patients and patients with cancer. Follow up in patients with significant   comorbidities as clinically warranted. For lung cancer screening, adhere to   Lung-RADS guidelines. Reference: Radiology. 2017; 284(1):228-43.         XR CHEST PORTABLE   Final Result   Mild ground-glass opacification in the left lower lung zone.  Atelectasis or   asymmetric edema may be

## 2024-11-23 LAB
ANISOCYTOSIS BLD QL SMEAR: ABNORMAL
BASOPHILS # BLD: 0 K/UL (ref 0–0.2)
BASOPHILS NFR BLD: 0.4 %
DACRYOCYTES BLD QL SMEAR: ABNORMAL
DEPRECATED RDW RBC AUTO: 16.4 % (ref 12.4–15.4)
EOSINOPHIL # BLD: 0 K/UL (ref 0–0.6)
EOSINOPHIL NFR BLD: 0.2 %
HCT VFR BLD AUTO: 26.6 % (ref 40.5–52.5)
HGB BLD-MCNC: 8.7 G/DL (ref 13.5–17.5)
HYPOCHROMIA BLD QL SMEAR: ABNORMAL
LYMPHOCYTES # BLD: 0.6 K/UL (ref 1–5.1)
LYMPHOCYTES NFR BLD: 20.9 %
MCH RBC QN AUTO: 26.9 PG (ref 26–34)
MCHC RBC AUTO-ENTMCNC: 32.7 G/DL (ref 31–36)
MCV RBC AUTO: 82.1 FL (ref 80–100)
MONOCYTES # BLD: 1.5 K/UL (ref 0–1.3)
MONOCYTES NFR BLD: 53.4 %
NEUTROPHILS # BLD: 0.7 K/UL (ref 1.7–7.7)
NEUTROPHILS NFR BLD: 25.1 %
OVALOCYTES BLD QL SMEAR: ABNORMAL
PATH INTERP BLD-IMP: NO
PLATELET # BLD AUTO: 23 K/UL (ref 135–450)
PLATELET BLD QL SMEAR: ABNORMAL
PMV BLD AUTO: 7.7 FL (ref 5–10.5)
POIKILOCYTOSIS BLD QL SMEAR: ABNORMAL
RBC # BLD AUTO: 3.24 M/UL (ref 4.2–5.9)
SCHISTOCYTES BLD QL SMEAR: ABNORMAL
SLIDE REVIEW: ABNORMAL
WBC # BLD AUTO: 2.8 K/UL (ref 4–11)

## 2024-11-23 NOTE — PROGRESS NOTES
Physician Progress Note      PATIENT:               ALEYDA MILLER  [facility] #:                  [id number]  :                       1970  ADMIT DATE:       11/15/2024 10:34 AM  DISCH DATE:        2024 4:18 PM  RESPONDING  PROVIDER #:        Ramsey Pereyra MD          QUERY TEXT:    Pt admitted with sepsis and has malnutrition documented in RD note on .   Please further specify type of malnutrition with documentation in the medical   record.    The medical record reflects the following:  Risk Factors: 54 y.o male with pMHx of SLE, and monoclonal gammopathy  Clinical Indicators:  - RD -Malnutrition Assessment:  Malnutrition Status:  Severe malnutrition (24)  Context:  Chronic Illness  Findings of the 6 clinical characteristics of malnutrition:  Energy Intake:  75% or less estimated energy requirements for 1 month or   longer  Weight Loss:  Greater than 10% over 6 months  Body Fat Loss:  Severe body fat loss Buccal region, Orbital  Muscle Mass Loss:  Severe muscle mass loss Temples (temporalis), Clavicles   (pectoralis & deltoids) (upper arms, patient aware and showed RD at time of   visit)  Nutrition Assessment:  Patient admitted with sepsis.  History of pancytopenia   and lupus.  Nutrition risk triggered due to weight loss and decreased appetite   prior to admission.  Currently on a regular diet with Ensure supplements   ordered tid.  Po intake % meals so far this admission.  Weight has been   trending down from 160-170's earlier this year (July) to 138 lbs this   admission.  Patient was unaware his weight had dropped from 151 to 138 lbs in   recent weeks.  RD provided guidance on how to gain weight appropriately,   adding at least 500 calories daily.  Patient is drin  Treatment: RD consult, or nutritional supplement.    ASPEN Criteria:    https://aspenjournals.onlinelibrary.bolden.com/doi/full/10.1177/910045392352439  5  Options provided:  -- This patient has Severe Protein

## 2024-12-04 ENCOUNTER — TELEPHONE (OUTPATIENT)
Dept: PULMONOLOGY | Age: 54
End: 2024-12-04

## 2024-12-04 NOTE — TELEPHONE ENCOUNTER
Pt called to cancel his 12/5 appt with MD due do having a blood test at .    I tried to reschedule and he was unhappy that the next available was 1/9/2025. Stated that he needs to be seen sooner so MD can look at his blood test

## 2024-12-04 NOTE — TELEPHONE ENCOUNTER
Dr Jauregui, I spoke with Dusty today and he had to cancel his 12/5/2024 appt at 11am. He had blood work done on 12/2/24 and has to have more done on 12/5/2024 at the same time. He is also scheduled for Infusion therapy on 12/9/2024 for his autoimmune disorder. He is very concerned about coming in to see you, but your next available day is 1/9/2025. He would like to see you before 1/9/2025, Please advise. Thanks.

## 2024-12-05 NOTE — TELEPHONE ENCOUNTER
I am only seeing him for a follow up CT scan due to pneumonia.  His next CT scan wouldn't be due until Feb so Jan is actually ok.  I have nothing to do with ordering his blood work.  My main role is to make sure he has a CT by Feb.  Jan 9th is completely reasonable

## 2024-12-05 NOTE — TELEPHONE ENCOUNTER
Ok. I will notify and make patient aware the reason for the appointment regarding his CT from having pneumonia. Thanks!

## 2024-12-06 ENCOUNTER — TELEPHONE (OUTPATIENT)
Dept: PULMONOLOGY | Age: 54
End: 2024-12-06

## 2024-12-06 NOTE — TELEPHONE ENCOUNTER
Pt calling in states he need your assistance with his pneumonia  It is not getting better--states airways restricted and is suppressing his lungs   He feels he needs to see you b/f January    Please advise

## 2024-12-17 ENCOUNTER — HOSPITAL ENCOUNTER (EMERGENCY)
Age: 54
Discharge: HOME | End: 2024-12-17
Payer: COMMERCIAL

## 2024-12-17 VITALS
OXYGEN SATURATION: 95 % | HEART RATE: 94 BPM | WEIGHT: 154.32 LBS | RESPIRATION RATE: 18 BRPM | SYSTOLIC BLOOD PRESSURE: 148 MMHG | BODY MASS INDEX: 22.78 KG/M2 | DIASTOLIC BLOOD PRESSURE: 85 MMHG | TEMPERATURE: 98.9 F

## 2024-12-17 DIAGNOSIS — L02.91 ABSCESS: Primary | ICD-10-CM

## 2024-12-17 PROCEDURE — 87205 SMEAR GRAM STAIN: CPT

## 2024-12-17 PROCEDURE — 10060 I&D ABSCESS SIMPLE/SINGLE: CPT

## 2024-12-17 PROCEDURE — 87070 CULTURE OTHR SPECIMN AEROBIC: CPT

## 2024-12-17 PROCEDURE — 99283 EMERGENCY DEPT VISIT LOW MDM: CPT

## 2024-12-17 RX ORDER — CEPHALEXIN 500 MG/1
500 CAPSULE ORAL 4 TIMES DAILY
Qty: 28 CAPSULE | Refills: 0 | Status: SHIPPED | OUTPATIENT
Start: 2024-12-17 | End: 2024-12-24

## 2024-12-17 ASSESSMENT — PAIN DESCRIPTION - DESCRIPTORS: DESCRIPTORS: ACHING

## 2024-12-17 ASSESSMENT — PAIN SCALES - GENERAL: PAINLEVEL_OUTOF10: 8

## 2024-12-17 ASSESSMENT — PAIN DESCRIPTION - PAIN TYPE: TYPE: ACUTE PAIN

## 2024-12-17 ASSESSMENT — PAIN DESCRIPTION - ONSET: ONSET: ON-GOING

## 2024-12-17 ASSESSMENT — PAIN DESCRIPTION - ORIENTATION: ORIENTATION: RIGHT

## 2024-12-17 ASSESSMENT — PAIN DESCRIPTION - FREQUENCY: FREQUENCY: CONTINUOUS

## 2024-12-17 ASSESSMENT — PAIN DESCRIPTION - LOCATION: LOCATION: HIP

## 2024-12-17 NOTE — ED PROVIDER NOTES
[facility] EMERGENCY DEPARTMENT  EMERGENCY DEPARTMENT ENCOUNTER        Pt Name: Dusty Jauregui  MRN: [id number]  Birthdate 1970  Date of evaluation: 12/17/2024  Provider: Taniya Davila PA-C  PCP: Linda Carpenter APRN - CNP  Note Started: 4:40 PM EST 12/17/24      DANDRE. I have evaluated this patient.        CHIEF COMPLAINT       Chief Complaint   Patient presents with    Abscess     Pt reports cyst to left hip area that \"started a week ago\". Pt reports that symptoms \"haven't improved\". Pt alert and oriented at this time with no signs of distress noted. Pt rates pain as a 8/10.       HISTORY OF PRESENT ILLNESS: 1 or more Elements             Dusty Jauregui is a 54 y.o. male who presents to the emergency department with complaint of a \"boil \"to the right upper thigh that is been present over the past week.  Not significantly changing.  History of these.  Denies any fevers or chills.  However he does endorse autoimmune disorder.  Currently on Levaquin for pneumonia.  He has pain to the site.  He has not taken anything to help alleviate his symptoms.  He also endorses picking at the wound and attempting to \"squeeze \"it.    Nursing Notes were all reviewed and agreed with or any disagreements were addressed in the HPI.    REVIEW OF SYSTEMS :      Review of Systems   All other systems reviewed and are negative.      Positives and Pertinent negatives as per HPI.     SURGICAL HISTORY   History reviewed. No pertinent surgical history.    CURRENTMEDICATIONS       Previous Medications    ACETAMINOPHEN (TYLENOL) 500 MG TABLET    Take 1 tablet by mouth 4 times daily as needed for Pain    ALBUTEROL SULFATE HFA (VENTOLIN HFA) 108 (90 BASE) MCG/ACT INHALER    Inhale 1 puff into the lungs 4 times daily as needed for Wheezing    BACLOFEN (LIORESAL) 10 MG TABLET    Take 1 tablet by mouth 3 times daily as needed (Muscle spasm)    BENLYSTA 200 MG/ML SOAJ    Inject 200 mg into the skin every 7 days

## 2024-12-17 NOTE — DISCHARGE INSTRUCTIONS
If your symptoms worsen or new concerning symptoms present return to the emergency department for further evaluation.  Please take antibiotics as prescribed.  Warm compresses to the area.  Clean with soap and water.

## 2024-12-17 NOTE — ED NOTES
D/C: Order noted for d/c. Pt confirmed d/c paperwork has correct name. Discharge and education instructions reviewed with patient. Teach-back successful.  Pt verbalized understanding and denied questions at this time. No acute distress noted. Patient instructed to follow-up as noted - return to emergency department if symptoms worsen. Patient verbalized understanding. Discharged per EDMD with discharge instructions. Pt discharged to private vehicle. Patient stable upon departure. Thanked patient for [facility] for care. Provider aware of patient pain at time of discharge.

## 2024-12-17 NOTE — ED NOTES
Sterile dressing applied to patient's incision at this time. Pt sent home with additional dressings.

## 2024-12-19 LAB
BACTERIA SPEC AEROBE CULT: NORMAL
GRAM STN SPEC: NORMAL

## 2024-12-31 ENCOUNTER — TELEPHONE (OUTPATIENT)
Dept: PULMONOLOGY | Age: 54
End: 2024-12-31

## 2024-12-31 DIAGNOSIS — J18.9 MULTIFOCAL PNEUMONIA: Primary | ICD-10-CM

## 2024-12-31 NOTE — TELEPHONE ENCOUNTER
Patient is having pian in his lower back when breathing in cold air from the Pneumonia . Also having fever yesterday. Patient has a autoimmune disorder. Amoxicillin did not help due the conflict with Levaquin. Made his breathing worse. Patient is asking for test/ scan. He is still having residual affects. Patient is asking for more testing.

## 2025-01-10 ENCOUNTER — TELEPHONE (OUTPATIENT)
Dept: PULMONOLOGY | Age: 55
End: 2025-01-10

## 2025-01-10 ENCOUNTER — HOSPITAL ENCOUNTER (OUTPATIENT)
Dept: CT IMAGING | Age: 55
Discharge: HOME | End: 2025-01-10
Attending: INTERNAL MEDICINE
Payer: COMMERCIAL

## 2025-01-10 DIAGNOSIS — J18.9 MULTIFOCAL PNEUMONIA: ICD-10-CM

## 2025-01-10 PROCEDURE — 71250 CT THORAX DX C-: CPT

## 2025-01-10 NOTE — TELEPHONE ENCOUNTER
Pt called said can he be squeezed in next week had a CT today needs albuterol sent lower back an lungs in pain can't stand 5 minutes. Said you want to see him when he got out of hospital.

## 2025-01-15 ENCOUNTER — OFFICE VISIT (OUTPATIENT)
Dept: PULMONOLOGY | Age: 55
End: 2025-01-15
Payer: COMMERCIAL

## 2025-01-15 VITALS
SYSTOLIC BLOOD PRESSURE: 140 MMHG | TEMPERATURE: 99.6 F | OXYGEN SATURATION: 97 % | DIASTOLIC BLOOD PRESSURE: 100 MMHG | BODY MASS INDEX: 22.81 KG/M2 | RESPIRATION RATE: 18 BRPM | HEART RATE: 107 BPM | HEIGHT: 69 IN | WEIGHT: 154 LBS

## 2025-01-15 DIAGNOSIS — J18.9 MULTIFOCAL PNEUMONIA: Primary | ICD-10-CM

## 2025-01-15 DIAGNOSIS — D70.9 NEUTROPENIA, UNSPECIFIED TYPE (HCC): ICD-10-CM

## 2025-01-15 PROCEDURE — 99214 OFFICE O/P EST MOD 30 MIN: CPT | Performed by: INTERNAL MEDICINE

## 2025-01-15 RX ORDER — CEFDINIR 300 MG/1
300 CAPSULE ORAL 2 TIMES DAILY
Qty: 20 CAPSULE | Refills: 0 | Status: SHIPPED | OUTPATIENT
Start: 2025-01-15 | End: 2025-01-25

## 2025-01-15 RX ORDER — METRONIDAZOLE 500 MG/1
500 TABLET ORAL 2 TIMES DAILY
Qty: 20 TABLET | Refills: 0 | Status: SHIPPED | OUTPATIENT
Start: 2025-01-15 | End: 2025-01-25

## 2025-01-15 NOTE — PROGRESS NOTES
Chief complaint  This is a 54 y.o. year old male  who comes to see me with a chief complaint of   Chief Complaint   Patient presents with    Follow-up       HPI  Here with a cc of hospital follow up    Was admitted for pneumonia with chronic immune suppression.  Treated and repeat imaging obtained which showed new air space opacities different from before.  Currently he says he gets chest tightness and coughing when exposed to cold air.  Albuterol helps.  He recently finished a month of levaquin.  He thinks he might need more antibiotics but not sure.          Current Outpatient Medications:     lisinopril (PRINIVIL;ZESTRIL) 20 MG tablet, Take 1 tablet by mouth daily, Disp: 30 tablet, Rfl: 0    albuterol sulfate HFA (VENTOLIN HFA) 108 (90 Base) MCG/ACT inhaler, Inhale 1 puff into the lungs 4 times daily as needed for Wheezing, Disp: 18 g, Rfl: 0    baclofen (LIORESAL) 10 MG tablet, Take 1 tablet by mouth 3 times daily as needed (Muscle spasm), Disp: , Rfl:     BENLYSTA 200 MG/ML SOAJ, Inject 200 mg into the skin every 7 days Sundays, Disp: , Rfl:     diclofenac sodium (VOLTAREN) 1 % GEL, Apply 4 g topically 4 times daily as needed for Pain, Disp: , Rfl:     mycophenolate (CELLCEPT) 500 MG tablet, Take 2 tablets by mouth 2 times daily, Disp: , Rfl:     tamsulosin (FLOMAX) 0.4 MG capsule, Take 1 capsule by mouth daily, Disp: , Rfl:     sildenafil (VIAGRA) 50 MG tablet, Take 1 tablet by mouth as needed for Erectile Dysfunction, Disp: , Rfl:     Multiple Vitamins-Minerals (THERAPEUTIC MULTIVITAMIN-MINERALS) tablet, Take 1 tablet by mouth daily, Disp: , Rfl:     acetaminophen (TYLENOL) 500 MG tablet, Take 1 tablet by mouth 4 times daily as needed for Pain, Disp: 120 tablet, Rfl: 2      PHYSICAL EXAM:  Vitals:    01/15/25 1217   BP: (!) 140/100   Pulse: (!) 107   Resp: 18   Temp: 99.6 °F (37.6 °C)   SpO2: 97%       GENERAL:  Well nourished, alert, appears stated age, no distress  HEENT:  No scleral icterus, no

## 2025-01-16 RX ORDER — ALBUTEROL SULFATE 90 UG/1
1 INHALANT RESPIRATORY (INHALATION) 4 TIMES DAILY PRN
Qty: 18 G | Refills: 0 | Status: SHIPPED | OUTPATIENT
Start: 2025-01-16

## 2025-01-20 ENCOUNTER — TELEPHONE (OUTPATIENT)
Dept: PULMONOLOGY | Age: 55
End: 2025-01-20

## 2025-01-20 DIAGNOSIS — J18.9 MULTIFOCAL PNEUMONIA: Primary | ICD-10-CM

## 2025-01-20 RX ORDER — LEVOFLOXACIN 500 MG/1
500 TABLET, FILM COATED ORAL DAILY
Qty: 14 TABLET | Refills: 0 | Status: SHIPPED | OUTPATIENT
Start: 2025-01-20 | End: 2025-02-03

## 2025-01-20 NOTE — TELEPHONE ENCOUNTER
[facility] [id number] [place] [id number] [facility] [address] - P [phone]       Patient wants levaquin sent in.  He says the combination of medication that was prescribed (cefdinir and metronidazole) suppressed his lungs and made his back hurt and made him feel like he could not breathe.  He has stopped taking the medication and wants levaquin instead.   He says he feels better but he has an autoimmune disease that makes it harder for him to heal and he wants the levaquin extended for 30 days.    Please review and advise.

## 2025-01-28 ENCOUNTER — TELEPHONE (OUTPATIENT)
Dept: PULMONOLOGY | Age: 55
End: 2025-01-28

## 2025-02-04 ENCOUNTER — TELEPHONE (OUTPATIENT)
Dept: PULMONOLOGY | Age: 55
End: 2025-02-04

## 2025-02-04 NOTE — TELEPHONE ENCOUNTER
Patient states he called [facility] to check the status of his nebulizer and medication. They advised him that they do not take his insurance. He says he is unable to call his insurance company and ask who takes his insurance and asks that we call for him. He would like a return call when this is complete. Thank you.

## 2025-02-06 ENCOUNTER — TELEPHONE (OUTPATIENT)
Dept: PULMONOLOGY | Age: 55
End: 2025-02-06

## 2025-02-06 DIAGNOSIS — J45.909 MILD ASTHMA WITHOUT COMPLICATION, UNSPECIFIED WHETHER PERSISTENT: Primary | ICD-10-CM

## 2025-02-06 RX ORDER — ALBUTEROL SULFATE 0.83 MG/ML
2.5 SOLUTION RESPIRATORY (INHALATION) EVERY 4 HOURS PRN
Qty: 120 ML | Refills: 11 | Status: SHIPPED | OUTPATIENT
Start: 2025-02-06

## 2025-02-06 NOTE — TELEPHONE ENCOUNTER
Patient needs nebulizer solution sent to [facility] on     Just received the Nebulizer machine     [facility] [id number] - [facility] [address] - P [phone] - F [phone]  [address], [facility] [address]  Phone: [phone]  Fax: [phone]

## 2025-02-27 ENCOUNTER — HOSPITAL ENCOUNTER (INPATIENT)
Age: 55
LOS: 1 days | Discharge: HOME | DRG: 351 | End: 2025-02-28
Attending: EMERGENCY MEDICINE | Admitting: INTERNAL MEDICINE
Payer: COMMERCIAL

## 2025-02-27 DIAGNOSIS — M60.9 MYOSITIS OF MULTIPLE SITES, UNSPECIFIED MYOSITIS TYPE: ICD-10-CM

## 2025-02-27 DIAGNOSIS — M62.82 NON-TRAUMATIC RHABDOMYOLYSIS: Primary | ICD-10-CM

## 2025-02-27 LAB
ACANTHOCYTES BLD QL SMEAR: ABNORMAL
ALBUMIN SERPL-MCNC: 4.2 G/DL (ref 3.4–5)
ALBUMIN/GLOB SERPL: 1 {RATIO} (ref 1.1–2.2)
ALP SERPL-CCNC: 97 U/L (ref 40–129)
ALT SERPL-CCNC: 149 U/L (ref 10–40)
ANION GAP SERPL CALCULATED.3IONS-SCNC: 12 MMOL/L (ref 3–16)
ANISOCYTOSIS BLD QL SMEAR: ABNORMAL
AST SERPL-CCNC: 181 U/L (ref 15–37)
BACTERIA URNS QL MICRO: NORMAL /HPF
BASOPHILS # BLD: 0 K/UL (ref 0–0.2)
BASOPHILS NFR BLD: 0 %
BILIRUB SERPL-MCNC: <0.2 MG/DL (ref 0–1)
BILIRUB UR QL STRIP.AUTO: NEGATIVE
BUN SERPL-MCNC: 19 MG/DL (ref 7–20)
CALCIUM SERPL-MCNC: 9.9 MG/DL (ref 8.3–10.6)
CHLORIDE SERPL-SCNC: 97 MMOL/L (ref 99–110)
CK SERPL-CCNC: 1675 U/L (ref 39–308)
CLARITY UR: CLEAR
CO2 SERPL-SCNC: 22 MMOL/L (ref 21–32)
COLOR UR: YELLOW
CREAT SERPL-MCNC: 0.8 MG/DL (ref 0.9–1.3)
DACRYOCYTES BLD QL SMEAR: ABNORMAL
DEPRECATED RDW RBC AUTO: 17.1 % (ref 12.4–15.4)
EOSINOPHIL # BLD: 0 K/UL (ref 0–0.6)
EOSINOPHIL NFR BLD: 0 %
EPI CELLS #/AREA URNS AUTO: 0 /HPF (ref 0–5)
GFR SERPLBLD CREATININE-BSD FMLA CKD-EPI: >90 ML/MIN/{1.73_M2}
GLUCOSE SERPL-MCNC: 92 MG/DL (ref 70–99)
GLUCOSE UR STRIP.AUTO-MCNC: NEGATIVE MG/DL
HCT VFR BLD AUTO: 31.4 % (ref 40.5–52.5)
HGB BLD-MCNC: 10.4 G/DL (ref 13.5–17.5)
HGB UR QL STRIP.AUTO: ABNORMAL
HYALINE CASTS #/AREA URNS AUTO: 0 /LPF (ref 0–8)
HYPOCHROMIA BLD QL SMEAR: ABNORMAL
KETONES UR STRIP.AUTO-MCNC: NEGATIVE MG/DL
LEUKOCYTE ESTERASE UR QL STRIP.AUTO: NEGATIVE
LYMPHOCYTES # BLD: 0.7 K/UL (ref 1–5.1)
LYMPHOCYTES NFR BLD: 8 %
MCH RBC QN AUTO: 27.3 PG (ref 26–34)
MCHC RBC AUTO-ENTMCNC: 33.2 G/DL (ref 31–36)
MCV RBC AUTO: 82.2 FL (ref 80–100)
MONOCYTES # BLD: 0.7 K/UL (ref 0–1.3)
MONOCYTES NFR BLD: 9 %
NEUTROPHILS # BLD: 6 K/UL (ref 1.7–7.7)
NEUTROPHILS NFR BLD: 79 %
NEUTS BAND NFR BLD MANUAL: 3 % (ref 0–7)
NITRITE UR QL STRIP.AUTO: NEGATIVE
PH UR STRIP.AUTO: 6 [PH] (ref 5–8)
PLATELET # BLD AUTO: 103 K/UL (ref 135–450)
PLATELET BLD QL SMEAR: ABNORMAL
PMV BLD AUTO: 10 FL (ref 5–10.5)
POLYCHROMASIA BLD QL SMEAR: ABNORMAL
POTASSIUM SERPL-SCNC: 4.5 MMOL/L (ref 3.5–5.1)
PROT SERPL-MCNC: 8.5 G/DL (ref 6.4–8.2)
PROT UR STRIP.AUTO-MCNC: NEGATIVE MG/DL
RBC # BLD AUTO: 3.82 M/UL (ref 4.2–5.9)
RBC CLUMPS #/AREA URNS AUTO: 0 /HPF (ref 0–4)
SLIDE REVIEW: ABNORMAL
SODIUM SERPL-SCNC: 131 MMOL/L (ref 136–145)
SP GR UR STRIP.AUTO: 1.01 (ref 1–1.03)
UA COMPLETE W REFLEX CULTURE PNL UR: ABNORMAL
UA DIPSTICK W REFLEX MICRO PNL UR: YES
URN SPEC COLLECT METH UR: ABNORMAL
UROBILINOGEN UR STRIP-ACNC: 0.2 E.U./DL
VARIANT LYMPHS NFR BLD MANUAL: 1 % (ref 0–6)
WBC # BLD AUTO: 7.3 K/UL (ref 4–11)
WBC #/AREA URNS AUTO: 0 /HPF (ref 0–5)

## 2025-02-27 PROCEDURE — 80053 COMPREHEN METABOLIC PANEL: CPT

## 2025-02-27 PROCEDURE — 36415 COLL VENOUS BLD VENIPUNCTURE: CPT

## 2025-02-27 PROCEDURE — 96360 HYDRATION IV INFUSION INIT: CPT

## 2025-02-27 PROCEDURE — 99285 EMERGENCY DEPT VISIT HI MDM: CPT

## 2025-02-27 PROCEDURE — 2580000003 HC RX 258: Performed by: PHYSICIAN ASSISTANT

## 2025-02-27 PROCEDURE — 82550 ASSAY OF CK (CPK): CPT

## 2025-02-27 PROCEDURE — 81001 URINALYSIS AUTO W/SCOPE: CPT

## 2025-02-27 PROCEDURE — 85025 COMPLETE CBC W/AUTO DIFF WBC: CPT

## 2025-02-27 PROCEDURE — 96361 HYDRATE IV INFUSION ADD-ON: CPT

## 2025-02-27 RX ORDER — 0.9 % SODIUM CHLORIDE 0.9 %
1000 INTRAVENOUS SOLUTION INTRAVENOUS ONCE
Status: COMPLETED | OUTPATIENT
Start: 2025-02-27 | End: 2025-02-28

## 2025-02-27 RX ADMIN — SODIUM CHLORIDE 1000 ML: 9 INJECTION, SOLUTION INTRAVENOUS at 21:19

## 2025-02-27 ASSESSMENT — PAIN - FUNCTIONAL ASSESSMENT: PAIN_FUNCTIONAL_ASSESSMENT: 0-10

## 2025-02-27 ASSESSMENT — PAIN SCALES - GENERAL: PAINLEVEL_OUTOF10: 5

## 2025-02-28 VITALS
BODY MASS INDEX: 22.17 KG/M2 | OXYGEN SATURATION: 100 % | HEART RATE: 92 BPM | SYSTOLIC BLOOD PRESSURE: 131 MMHG | RESPIRATION RATE: 16 BRPM | TEMPERATURE: 98.6 F | DIASTOLIC BLOOD PRESSURE: 79 MMHG | WEIGHT: 150.13 LBS

## 2025-02-28 PROBLEM — M60.9 MYOSITIS: Status: ACTIVE | Noted: 2025-02-28

## 2025-02-28 LAB
ACANTHOCYTES BLD QL SMEAR: ABNORMAL
ANION GAP SERPL CALCULATED.3IONS-SCNC: 10 MMOL/L (ref 3–16)
ANISOCYTOSIS BLD QL SMEAR: ABNORMAL
BASOPHILS # BLD: 0 K/UL (ref 0–0.2)
BASOPHILS NFR BLD: 0 %
BUN SERPL-MCNC: 19 MG/DL (ref 7–20)
CALCIUM SERPL-MCNC: 9.1 MG/DL (ref 8.3–10.6)
CHLORIDE SERPL-SCNC: 103 MMOL/L (ref 99–110)
CK SERPL-CCNC: 1728 U/L (ref 39–308)
CO2 SERPL-SCNC: 22 MMOL/L (ref 21–32)
CREAT SERPL-MCNC: 0.6 MG/DL (ref 0.9–1.3)
DACRYOCYTES BLD QL SMEAR: ABNORMAL
DEPRECATED RDW RBC AUTO: 17.3 % (ref 12.4–15.4)
EOSINOPHIL # BLD: 0.1 K/UL (ref 0–0.6)
EOSINOPHIL NFR BLD: 1 %
GFR SERPLBLD CREATININE-BSD FMLA CKD-EPI: >90 ML/MIN/{1.73_M2}
GLUCOSE SERPL-MCNC: 85 MG/DL (ref 70–99)
HCT VFR BLD AUTO: 31.8 % (ref 40.5–52.5)
HGB BLD-MCNC: 10.6 G/DL (ref 13.5–17.5)
HYPOCHROMIA BLD QL SMEAR: ABNORMAL
LYMPHOCYTES # BLD: 0.9 K/UL (ref 1–5.1)
LYMPHOCYTES NFR BLD: 18 %
MCH RBC QN AUTO: 27.5 PG (ref 26–34)
MCHC RBC AUTO-ENTMCNC: 33.4 G/DL (ref 31–36)
MCV RBC AUTO: 82.3 FL (ref 80–100)
METAMYELOCYTES NFR BLD MANUAL: 1 %
MONOCYTES # BLD: 1 K/UL (ref 0–1.3)
MONOCYTES NFR BLD: 19 %
MYELOCYTES NFR BLD MANUAL: 3 %
NEUTROPHILS # BLD: 3.1 K/UL (ref 1.7–7.7)
NEUTROPHILS NFR BLD: 55 %
NEUTS BAND NFR BLD MANUAL: 2 % (ref 0–7)
PLATELET # BLD AUTO: 114 K/UL (ref 135–450)
PLATELET BLD QL SMEAR: ABNORMAL
PMV BLD AUTO: 10.2 FL (ref 5–10.5)
POLYCHROMASIA BLD QL SMEAR: ABNORMAL
POTASSIUM SERPL-SCNC: 3.9 MMOL/L (ref 3.5–5.1)
PROMYELOCYTES NFR BLD MANUAL: 1 %
RBC # BLD AUTO: 3.86 M/UL (ref 4.2–5.9)
SLIDE REVIEW: ABNORMAL
SODIUM SERPL-SCNC: 135 MMOL/L (ref 136–145)
WBC # BLD AUTO: 5 K/UL (ref 4–11)

## 2025-02-28 PROCEDURE — 2580000003 HC RX 258: Performed by: INTERNAL MEDICINE

## 2025-02-28 PROCEDURE — 82550 ASSAY OF CK (CPK): CPT

## 2025-02-28 PROCEDURE — 6360000002 HC RX W HCPCS: Performed by: PHYSICIAN ASSISTANT

## 2025-02-28 PROCEDURE — 80048 BASIC METABOLIC PNL TOTAL CA: CPT

## 2025-02-28 PROCEDURE — 85025 COMPLETE CBC W/AUTO DIFF WBC: CPT

## 2025-02-28 PROCEDURE — 36415 COLL VENOUS BLD VENIPUNCTURE: CPT

## 2025-02-28 PROCEDURE — 2580000003 HC RX 258: Performed by: PHYSICIAN ASSISTANT

## 2025-02-28 PROCEDURE — 6370000000 HC RX 637 (ALT 250 FOR IP): Performed by: INTERNAL MEDICINE

## 2025-02-28 PROCEDURE — 1200000000 HC SEMI PRIVATE

## 2025-02-28 RX ORDER — LISINOPRIL 10 MG/1
40 TABLET ORAL DAILY
Status: DISCONTINUED | OUTPATIENT
Start: 2025-02-28 | End: 2025-02-28 | Stop reason: HOSPADM

## 2025-02-28 RX ORDER — PREDNISONE 20 MG/1
20 TABLET ORAL DAILY
Status: DISCONTINUED | OUTPATIENT
Start: 2025-02-28 | End: 2025-02-28 | Stop reason: HOSPADM

## 2025-02-28 RX ORDER — POTASSIUM CHLORIDE 1500 MG/1
40 TABLET, EXTENDED RELEASE ORAL PRN
Status: DISCONTINUED | OUTPATIENT
Start: 2025-02-28 | End: 2025-02-28 | Stop reason: HOSPADM

## 2025-02-28 RX ORDER — POLYETHYLENE GLYCOL 3350 17 G/17G
17 POWDER, FOR SOLUTION ORAL DAILY PRN
Status: DISCONTINUED | OUTPATIENT
Start: 2025-02-28 | End: 2025-02-28 | Stop reason: HOSPADM

## 2025-02-28 RX ORDER — CLONIDINE HYDROCHLORIDE 0.1 MG/1
0.1 TABLET ORAL 2 TIMES DAILY
COMMUNITY

## 2025-02-28 RX ORDER — MAGNESIUM SULFATE IN WATER 40 MG/ML
2000 INJECTION, SOLUTION INTRAVENOUS PRN
Status: DISCONTINUED | OUTPATIENT
Start: 2025-02-28 | End: 2025-02-28 | Stop reason: HOSPADM

## 2025-02-28 RX ORDER — HYDROCHLOROTHIAZIDE 25 MG/1
25 TABLET ORAL DAILY
COMMUNITY

## 2025-02-28 RX ORDER — BIOTIN 10 MG
10 TABLET ORAL DAILY
COMMUNITY

## 2025-02-28 RX ORDER — PREDNISOLONE 5 MG/1
TABLET ORAL SEE ADMIN INSTRUCTIONS
COMMUNITY

## 2025-02-28 RX ORDER — MYCOPHENOLATE MOFETIL 250 MG/1
500 CAPSULE ORAL ONCE
Status: COMPLETED | OUTPATIENT
Start: 2025-02-28 | End: 2025-02-28

## 2025-02-28 RX ORDER — AMLODIPINE BESYLATE 10 MG/1
10 TABLET ORAL DAILY
COMMUNITY

## 2025-02-28 RX ORDER — ALBUTEROL SULFATE 90 UG/1
1 INHALANT RESPIRATORY (INHALATION) 4 TIMES DAILY PRN
Status: DISCONTINUED | OUTPATIENT
Start: 2025-02-28 | End: 2025-02-28 | Stop reason: HOSPADM

## 2025-02-28 RX ORDER — SODIUM CHLORIDE 9 MG/ML
INJECTION, SOLUTION INTRAVENOUS PRN
Status: DISCONTINUED | OUTPATIENT
Start: 2025-02-28 | End: 2025-02-28 | Stop reason: HOSPADM

## 2025-02-28 RX ORDER — MYCOPHENOLATE MOFETIL 250 MG/1
1000 CAPSULE ORAL ONCE
Status: COMPLETED | OUTPATIENT
Start: 2025-02-28 | End: 2025-02-28

## 2025-02-28 RX ORDER — MYCOPHENOLATE MOFETIL 250 MG/1
1000 CAPSULE ORAL 2 TIMES DAILY
Status: DISCONTINUED | OUTPATIENT
Start: 2025-02-28 | End: 2025-02-28 | Stop reason: HOSPADM

## 2025-02-28 RX ORDER — ENOXAPARIN SODIUM 100 MG/ML
40 INJECTION SUBCUTANEOUS DAILY
Status: DISCONTINUED | OUTPATIENT
Start: 2025-02-28 | End: 2025-02-28 | Stop reason: HOSPADM

## 2025-02-28 RX ORDER — ALBUTEROL SULFATE 0.83 MG/ML
2.5 SOLUTION RESPIRATORY (INHALATION) EVERY 4 HOURS PRN
Status: DISCONTINUED | OUTPATIENT
Start: 2025-02-28 | End: 2025-02-28 | Stop reason: HOSPADM

## 2025-02-28 RX ORDER — ACETAMINOPHEN 325 MG/1
650 TABLET ORAL EVERY 6 HOURS PRN
Status: DISCONTINUED | OUTPATIENT
Start: 2025-02-28 | End: 2025-02-28 | Stop reason: HOSPADM

## 2025-02-28 RX ORDER — LISINOPRIL 20 MG/1
40 TABLET ORAL DAILY
COMMUNITY

## 2025-02-28 RX ORDER — SODIUM CHLORIDE 0.9 % (FLUSH) 0.9 %
5-40 SYRINGE (ML) INJECTION PRN
Status: DISCONTINUED | OUTPATIENT
Start: 2025-02-28 | End: 2025-02-28 | Stop reason: HOSPADM

## 2025-02-28 RX ORDER — ACETAMINOPHEN 650 MG/1
650 SUPPOSITORY RECTAL EVERY 6 HOURS PRN
Status: DISCONTINUED | OUTPATIENT
Start: 2025-02-28 | End: 2025-02-28 | Stop reason: HOSPADM

## 2025-02-28 RX ORDER — M-VIT,TX,IRON,MINS/CALC/FOLIC 27MG-0.4MG
1 TABLET ORAL DAILY
Status: DISCONTINUED | OUTPATIENT
Start: 2025-02-28 | End: 2025-02-28 | Stop reason: HOSPADM

## 2025-02-28 RX ORDER — 0.9 % SODIUM CHLORIDE 0.9 %
1000 INTRAVENOUS SOLUTION INTRAVENOUS ONCE
Status: COMPLETED | OUTPATIENT
Start: 2025-02-28 | End: 2025-02-28

## 2025-02-28 RX ORDER — SODIUM CHLORIDE 0.9 % (FLUSH) 0.9 %
5-40 SYRINGE (ML) INJECTION EVERY 12 HOURS SCHEDULED
Status: DISCONTINUED | OUTPATIENT
Start: 2025-02-28 | End: 2025-02-28 | Stop reason: HOSPADM

## 2025-02-28 RX ORDER — ONDANSETRON 4 MG/1
4 TABLET, ORALLY DISINTEGRATING ORAL EVERY 8 HOURS PRN
Status: DISCONTINUED | OUTPATIENT
Start: 2025-02-28 | End: 2025-02-28 | Stop reason: HOSPADM

## 2025-02-28 RX ORDER — SODIUM CHLORIDE 9 MG/ML
INJECTION, SOLUTION INTRAVENOUS CONTINUOUS
Status: DISCONTINUED | OUTPATIENT
Start: 2025-02-28 | End: 2025-02-28 | Stop reason: HOSPADM

## 2025-02-28 RX ORDER — BACLOFEN 10 MG/1
10 TABLET ORAL 3 TIMES DAILY PRN
Status: DISCONTINUED | OUTPATIENT
Start: 2025-02-28 | End: 2025-02-28

## 2025-02-28 RX ORDER — ONDANSETRON 2 MG/ML
4 INJECTION INTRAMUSCULAR; INTRAVENOUS EVERY 6 HOURS PRN
Status: DISCONTINUED | OUTPATIENT
Start: 2025-02-28 | End: 2025-02-28 | Stop reason: HOSPADM

## 2025-02-28 RX ORDER — POTASSIUM CHLORIDE 7.45 MG/ML
10 INJECTION INTRAVENOUS PRN
Status: DISCONTINUED | OUTPATIENT
Start: 2025-02-28 | End: 2025-02-28 | Stop reason: HOSPADM

## 2025-02-28 RX ADMIN — MYCOPHENOLATE MOFETIL 500 MG: 250 CAPSULE ORAL at 01:43

## 2025-02-28 RX ADMIN — SODIUM CHLORIDE: 9 INJECTION, SOLUTION INTRAVENOUS at 04:31

## 2025-02-28 RX ADMIN — PREDNISONE 20 MG: 20 TABLET ORAL at 06:35

## 2025-02-28 RX ADMIN — SODIUM CHLORIDE 1000 ML: 9 INJECTION, SOLUTION INTRAVENOUS at 01:23

## 2025-02-28 RX ADMIN — MYCOPHENOLATE MOFETIL 500 MG: 250 CAPSULE ORAL at 02:54

## 2025-02-28 NOTE — ED PROVIDER NOTES
1,675 (*)     All other components within normal limits   URINALYSIS WITH REFLEX TO CULTURE - Abnormal; Notable for the following components:    Blood, Urine SMALL (*)     All other components within normal limits   MICROSCOPIC URINALYSIS       All other labs were within normal range or not returned as of this dictation.    EMERGENCY DEPARTMENT COURSE and DIFFERENTIAL DIAGNOSIS/MDM:   Vitals:    Vitals:    02/27/25 1947   BP: (!) 145/86   Pulse: 99   Resp: 16   Temp: 98.4 °F (36.9 °C)   TempSrc: Oral   SpO2: 100%   Weight: 68.1 kg (150 lb 2.1 oz)       The patient was given the following medications:  Medications   sodium chloride 0.9 % bolus 1,000 mL (1,000 mLs IntraVENous New Bag 2/27/25 2119)       MDM        The patient presented to the emerged part with a complaint of diffuse muscle pain especially in the arms and deltoid area and thighs, dark urine intermittently, and elevated CK.  He had blood test drawn on February 12, 2025 and is total CK was 4894.    He is afebrile and hemodynamically stable.  No nausea or vomiting.  No chest pain.    White blood cell count of 7.3.  Hemoglobin 10.4.  Platelets 103.    Sodium 131.  Creatinine 0.8.  Glucose 92.    ALT is 149 and AST is 181.  He has had mild elevation of liver enzymes in the past.    CK is elevated at 1000 675.    Currently urine is clear.    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  IV fluids were given in the Emergency Department he will be admitted for IV fluids and trending of his CK.          I personally saw and made/approved the management plan and take responsibility for the patient management.      CRITICAL CARE TIME     I personally saw the patient and independently provided 0 minutes of non-concurrent critical care out of the total shared critical care time provided. This excludes separately reportable procedures.    There was a high probability of clinically significant/life threatening deterioration 
weeks. His PCP did yearly labs and ran a CK level and it was severely elevated. He has had progressively worsening fatigue and weakness and muscle spasms to his bilateral arms and legs, biceps and thighs. He notes his urine has been dark for the last 2-3 weeks.  [CS]      ED Course User Index  [CS] Veronica Barber PA        Chronic Conditions affecting care:    has a past medical history of Lupus (systemic lupus erythematosus) (HCC) and MGUS (monoclonal gammopathy of unknown significance).    CONSULTS: (Who and What was discussed)  None  Hospitalist    Social Determinants Significantly Affecting Health : Patient has significant healthcare illiteracy. Additional time provided in explanations.    Records Reviewed (External, Source and Summary) Recent PCP, Hemeonc visits with outpatient labs reviewed CK elevated 4k+     CC/HPI Summary, DDx, ED Course, and Reassessment: Patient seen and evaluated. Old records reviewed. Diagnostic testing reviewed and results discussed.      This is a pleasant 54-year-old male who presents for evaluation of elevated CK level.  On exam he is alert oriented appropriately vital signs stable.  He does appear well-hydrated nontoxic.  He has intact strength to biceps and thigh muscles but has tenderness in this area.  No sign of infection.  He had outpatient labs and visits which were reviewed by myself as dictated above.  He today had repeat blood work, his CK is still markedly elevated, at this time I believe he warrants inpatient treatment for rhabdomyolysis and myositis, IV fluids initiated in the department.  Patient agrees with the above-stated plan.     Disposition Considerations (tests considered but not done, Admit vs D/C, Shared Decision Making, Pt Expectation of Test or Tx.): All information including ED workup, results, treatment, diagnosis has been reviewed and discussed with ED attending physician and directly discussed with Hospitalist who is the admitting physician. Pt will be

## 2025-02-28 NOTE — ED NOTES
After pt signed AMA paperwork: at nurses' station again asking labs: provider at nurses's station answering pts questions.

## 2025-02-28 NOTE — ED NOTES
Pt signed AMA form and left, pt alert and oriented x4, skin warm and dry, resp even and unlabored, refused repeat vital signs, ambulated out at discharge with steady gait without difficulty

## 2025-02-28 NOTE — H&P
Hospital Medicine History & Physical      Date of Admission: 2/27/2025    Date of Service:  Pt seen/examined on 02/28/25     [x]Admitted to Inpatient with expected LOS greater than two midnights due to medical therapy.  []Placed in Observation status.    Chief Admission Complaint: Elevated CK    Presenting Admission History:      54 y.o. male with PMHx significant for smoldering multiple myeloma, essential hypertension, hyperlipidemia, ILD, pancytopenia, systemic lupus who presented to ED with a complaint of elevated CK level.He has a history of lupus, history of smoldering multiple myeloma, MGUS. After restarting his CellCept from a period of discontinuation after getting pneumonia in November he developed symptoms of myositis to multiple sites, bilateral biceps and bilateral thighs. He had outpatient labs which showed elevated CK level of 4 800 back on 2/12/2025, he notes continued muscle pain, extreme fatigue, dark urine. He is on prednisone currently. He follows with heme-onc, rheumatology, PCP. Referred to the ER from PCP.  CK level was noted to be 1680.  Patient is current being admitted for further management and evaluation    ROS:     Review of 10 systems is negative except what is outlined in HPI.     Assessment:  Nontraumatic rhabdomyolysis, CK levels is trending down.  Inflammatory myopathy.  Systemic lupus erythematosus.  Smoldering multiple myeloma.  Interstitial lung disease  Pancytopenia, improving related to SLE.  Essential hypertension.  Hyperlipidemia    Plan:    Patient admitted under inpatient status.  Continue IV fluid.  Repeat CK level in the morning.  CK level is trending down.  Patient had a CK level of 4800 back in 2/12/2025 currently CK level is around 1600.  Continue home CellCept and prednisone.  Resume the rest of home medication as appropriate.      Physical Exam Performed:      /78   Pulse 87   Temp 98.6 °F (37 °C) (Oral)   Resp 16   Wt 68.1 kg (150 lb 2.1 oz)   SpO2 100%

## 2025-03-03 ENCOUNTER — HOSPITAL ENCOUNTER (INPATIENT)
Age: 55
LOS: 2 days | Discharge: ANOTHER HOSPITAL | DRG: 351 | End: 2025-03-05
Attending: STUDENT IN AN ORGANIZED HEALTH CARE EDUCATION/TRAINING PROGRAM | Admitting: INTERNAL MEDICINE
Payer: COMMERCIAL

## 2025-03-03 DIAGNOSIS — R53.83 OTHER FATIGUE: Primary | ICD-10-CM

## 2025-03-03 DIAGNOSIS — M62.82 NON-TRAUMATIC RHABDOMYOLYSIS: ICD-10-CM

## 2025-03-03 LAB
ALBUMIN SERPL-MCNC: 4 G/DL (ref 3.4–5)
ALBUMIN/GLOB SERPL: 0.9 {RATIO} (ref 1.1–2.2)
ALP SERPL-CCNC: 103 U/L (ref 40–129)
ALT SERPL-CCNC: 152 U/L (ref 10–40)
ANION GAP SERPL CALCULATED.3IONS-SCNC: 12 MMOL/L (ref 3–16)
ANISOCYTOSIS BLD QL SMEAR: ABNORMAL
AST SERPL-CCNC: 203 U/L (ref 15–37)
BACTERIA URNS QL MICRO: NORMAL /HPF
BASOPHILS # BLD: 0 K/UL (ref 0–0.2)
BASOPHILS NFR BLD: 0 %
BILIRUB SERPL-MCNC: <0.2 MG/DL (ref 0–1)
BILIRUB UR QL STRIP.AUTO: NEGATIVE
BUN SERPL-MCNC: 27 MG/DL (ref 7–20)
CALCIUM SERPL-MCNC: 9.5 MG/DL (ref 8.3–10.6)
CHLORIDE SERPL-SCNC: 99 MMOL/L (ref 99–110)
CK SERPL-CCNC: 1515 U/L (ref 39–308)
CK SERPL-CCNC: 1938 U/L (ref 39–308)
CLARITY UR: CLEAR
CO2 SERPL-SCNC: 22 MMOL/L (ref 21–32)
COLOR UR: YELLOW
CREAT SERPL-MCNC: 0.8 MG/DL (ref 0.9–1.3)
DACRYOCYTES BLD QL SMEAR: ABNORMAL
DEPRECATED RDW RBC AUTO: 17.2 % (ref 12.4–15.4)
EOSINOPHIL # BLD: 0.1 K/UL (ref 0–0.6)
EOSINOPHIL NFR BLD: 1 %
EPI CELLS #/AREA URNS AUTO: 0 /HPF (ref 0–5)
GFR SERPLBLD CREATININE-BSD FMLA CKD-EPI: >90 ML/MIN/{1.73_M2}
GLUCOSE SERPL-MCNC: 85 MG/DL (ref 70–99)
GLUCOSE UR STRIP.AUTO-MCNC: NEGATIVE MG/DL
HCT VFR BLD AUTO: 29.9 % (ref 40.5–52.5)
HGB BLD-MCNC: 10.1 G/DL (ref 13.5–17.5)
HGB UR QL STRIP.AUTO: ABNORMAL
HYALINE CASTS #/AREA URNS AUTO: 6 /LPF (ref 0–8)
HYPOCHROMIA BLD QL SMEAR: ABNORMAL
KETONES UR STRIP.AUTO-MCNC: NEGATIVE MG/DL
LEUKOCYTE ESTERASE UR QL STRIP.AUTO: NEGATIVE
LYMPHOCYTES # BLD: 0.6 K/UL (ref 1–5.1)
LYMPHOCYTES NFR BLD: 6 %
MCH RBC QN AUTO: 27.9 PG (ref 26–34)
MCHC RBC AUTO-ENTMCNC: 33.7 G/DL (ref 31–36)
MCV RBC AUTO: 82.9 FL (ref 80–100)
MONOCYTES # BLD: 0.8 K/UL (ref 0–1.3)
MONOCYTES NFR BLD: 10 %
NEUTROPHILS # BLD: 6.8 K/UL (ref 1.7–7.7)
NEUTROPHILS NFR BLD: 72 %
NEUTS BAND NFR BLD MANUAL: 10 % (ref 0–7)
NITRITE UR QL STRIP.AUTO: NEGATIVE
PH UR STRIP.AUTO: 5.5 [PH] (ref 5–8)
PLATELET # BLD AUTO: 105 K/UL (ref 135–450)
PLATELET BLD QL SMEAR: ABNORMAL
PMV BLD AUTO: 10.5 FL (ref 5–10.5)
POLYCHROMASIA BLD QL SMEAR: ABNORMAL
POTASSIUM SERPL-SCNC: 4.1 MMOL/L (ref 3.5–5.1)
PROT SERPL-MCNC: 8.4 G/DL (ref 6.4–8.2)
PROT UR STRIP.AUTO-MCNC: 30 MG/DL
RBC # BLD AUTO: 3.6 M/UL (ref 4.2–5.9)
RBC CLUMPS #/AREA URNS AUTO: 0 /HPF (ref 0–4)
SLIDE REVIEW: ABNORMAL
SODIUM SERPL-SCNC: 133 MMOL/L (ref 136–145)
SP GR UR STRIP.AUTO: 1.02 (ref 1–1.03)
UA COMPLETE W REFLEX CULTURE PNL UR: ABNORMAL
UA DIPSTICK W REFLEX MICRO PNL UR: YES
URN SPEC COLLECT METH UR: ABNORMAL
UROBILINOGEN UR STRIP-ACNC: 0.2 E.U./DL
VARIANT LYMPHS NFR BLD MANUAL: 1 % (ref 0–6)
WBC # BLD AUTO: 8.3 K/UL (ref 4–11)
WBC #/AREA URNS AUTO: 1 /HPF (ref 0–5)

## 2025-03-03 PROCEDURE — 6370000000 HC RX 637 (ALT 250 FOR IP): Performed by: INTERNAL MEDICINE

## 2025-03-03 PROCEDURE — 1200000000 HC SEMI PRIVATE

## 2025-03-03 PROCEDURE — 82550 ASSAY OF CK (CPK): CPT

## 2025-03-03 PROCEDURE — 80053 COMPREHEN METABOLIC PANEL: CPT

## 2025-03-03 PROCEDURE — 99285 EMERGENCY DEPT VISIT HI MDM: CPT

## 2025-03-03 PROCEDURE — 2580000003 HC RX 258: Performed by: INTERNAL MEDICINE

## 2025-03-03 PROCEDURE — 6370000000 HC RX 637 (ALT 250 FOR IP): Performed by: HOSPITALIST

## 2025-03-03 PROCEDURE — 36415 COLL VENOUS BLD VENIPUNCTURE: CPT

## 2025-03-03 PROCEDURE — 6360000002 HC RX W HCPCS: Performed by: INTERNAL MEDICINE

## 2025-03-03 PROCEDURE — 6360000002 HC RX W HCPCS: Performed by: HOSPITALIST

## 2025-03-03 PROCEDURE — 81001 URINALYSIS AUTO W/SCOPE: CPT

## 2025-03-03 PROCEDURE — 85025 COMPLETE CBC W/AUTO DIFF WBC: CPT

## 2025-03-03 RX ORDER — PREDNISONE 5 MG/1
5 TABLET ORAL SEE ADMIN INSTRUCTIONS
COMMUNITY

## 2025-03-03 RX ORDER — ACETAMINOPHEN 325 MG/1
650 TABLET ORAL EVERY 6 HOURS PRN
Status: DISCONTINUED | OUTPATIENT
Start: 2025-03-03 | End: 2025-03-05 | Stop reason: HOSPADM

## 2025-03-03 RX ORDER — LISINOPRIL 40 MG/1
40 TABLET ORAL DAILY
Status: DISCONTINUED | OUTPATIENT
Start: 2025-03-04 | End: 2025-03-05 | Stop reason: HOSPADM

## 2025-03-03 RX ORDER — HYDROCHLOROTHIAZIDE 25 MG/1
25 TABLET ORAL DAILY
Status: DISCONTINUED | OUTPATIENT
Start: 2025-03-04 | End: 2025-03-05 | Stop reason: HOSPADM

## 2025-03-03 RX ORDER — SODIUM CHLORIDE 9 MG/ML
INJECTION, SOLUTION INTRAVENOUS PRN
Status: DISCONTINUED | OUTPATIENT
Start: 2025-03-03 | End: 2025-03-05 | Stop reason: HOSPADM

## 2025-03-03 RX ORDER — CLONIDINE HYDROCHLORIDE 0.1 MG/1
0.1 TABLET ORAL 2 TIMES DAILY
Status: DISCONTINUED | OUTPATIENT
Start: 2025-03-03 | End: 2025-03-05 | Stop reason: HOSPADM

## 2025-03-03 RX ORDER — BIOTIN 10 MG
10 TABLET ORAL DAILY
Status: DISCONTINUED | OUTPATIENT
Start: 2025-03-03 | End: 2025-03-03 | Stop reason: RX

## 2025-03-03 RX ORDER — MYCOPHENOLATE MOFETIL 250 MG/1
1000 CAPSULE ORAL 2 TIMES DAILY
Status: DISCONTINUED | OUTPATIENT
Start: 2025-03-03 | End: 2025-03-05 | Stop reason: HOSPADM

## 2025-03-03 RX ORDER — ACETAMINOPHEN 650 MG/1
650 SUPPOSITORY RECTAL EVERY 6 HOURS PRN
Status: DISCONTINUED | OUTPATIENT
Start: 2025-03-03 | End: 2025-03-05 | Stop reason: HOSPADM

## 2025-03-03 RX ORDER — AMLODIPINE BESYLATE 10 MG/1
10 TABLET ORAL DAILY
Status: DISCONTINUED | OUTPATIENT
Start: 2025-03-03 | End: 2025-03-05 | Stop reason: HOSPADM

## 2025-03-03 RX ORDER — POLYETHYLENE GLYCOL 3350 17 G/17G
17 POWDER, FOR SOLUTION ORAL DAILY PRN
Status: DISCONTINUED | OUTPATIENT
Start: 2025-03-03 | End: 2025-03-05 | Stop reason: HOSPADM

## 2025-03-03 RX ORDER — ONDANSETRON 4 MG/1
4 TABLET, ORALLY DISINTEGRATING ORAL EVERY 8 HOURS PRN
Status: DISCONTINUED | OUTPATIENT
Start: 2025-03-03 | End: 2025-03-05 | Stop reason: HOSPADM

## 2025-03-03 RX ORDER — POTASSIUM CHLORIDE 1500 MG/1
40 TABLET, EXTENDED RELEASE ORAL PRN
Status: DISCONTINUED | OUTPATIENT
Start: 2025-03-03 | End: 2025-03-05 | Stop reason: HOSPADM

## 2025-03-03 RX ORDER — ONDANSETRON 2 MG/ML
4 INJECTION INTRAMUSCULAR; INTRAVENOUS EVERY 6 HOURS PRN
Status: DISCONTINUED | OUTPATIENT
Start: 2025-03-03 | End: 2025-03-05 | Stop reason: HOSPADM

## 2025-03-03 RX ORDER — SODIUM CHLORIDE 9 MG/ML
INJECTION, SOLUTION INTRAVENOUS CONTINUOUS
Status: ACTIVE | OUTPATIENT
Start: 2025-03-03 | End: 2025-03-04

## 2025-03-03 RX ORDER — MAGNESIUM SULFATE IN WATER 40 MG/ML
2000 INJECTION, SOLUTION INTRAVENOUS PRN
Status: DISCONTINUED | OUTPATIENT
Start: 2025-03-03 | End: 2025-03-05 | Stop reason: HOSPADM

## 2025-03-03 RX ORDER — ENOXAPARIN SODIUM 100 MG/ML
40 INJECTION SUBCUTANEOUS DAILY
Status: DISCONTINUED | OUTPATIENT
Start: 2025-03-03 | End: 2025-03-05 | Stop reason: HOSPADM

## 2025-03-03 RX ORDER — SODIUM CHLORIDE 0.9 % (FLUSH) 0.9 %
5-40 SYRINGE (ML) INJECTION EVERY 12 HOURS SCHEDULED
Status: DISCONTINUED | OUTPATIENT
Start: 2025-03-03 | End: 2025-03-05 | Stop reason: HOSPADM

## 2025-03-03 RX ORDER — SODIUM CHLORIDE 0.9 % (FLUSH) 0.9 %
5-40 SYRINGE (ML) INJECTION PRN
Status: DISCONTINUED | OUTPATIENT
Start: 2025-03-03 | End: 2025-03-05 | Stop reason: HOSPADM

## 2025-03-03 RX ORDER — ACETAMINOPHEN 500 MG
500 TABLET ORAL 4 TIMES DAILY PRN
Status: DISCONTINUED | OUTPATIENT
Start: 2025-03-03 | End: 2025-03-03 | Stop reason: SDUPTHER

## 2025-03-03 RX ORDER — TAMSULOSIN HYDROCHLORIDE 0.4 MG/1
0.4 CAPSULE ORAL NIGHTLY
Status: DISCONTINUED | OUTPATIENT
Start: 2025-03-03 | End: 2025-03-05 | Stop reason: HOSPADM

## 2025-03-03 RX ORDER — PREDNISONE 5 MG/1
5 TABLET ORAL SEE ADMIN INSTRUCTIONS
Status: DISCONTINUED | OUTPATIENT
Start: 2025-03-03 | End: 2025-03-03 | Stop reason: SDUPTHER

## 2025-03-03 RX ORDER — ALBUTEROL SULFATE 0.83 MG/ML
2.5 SOLUTION RESPIRATORY (INHALATION) EVERY 4 HOURS PRN
Status: DISCONTINUED | OUTPATIENT
Start: 2025-03-03 | End: 2025-03-05 | Stop reason: HOSPADM

## 2025-03-03 RX ORDER — POTASSIUM CHLORIDE 7.45 MG/ML
10 INJECTION INTRAVENOUS PRN
Status: DISCONTINUED | OUTPATIENT
Start: 2025-03-03 | End: 2025-03-05 | Stop reason: HOSPADM

## 2025-03-03 RX ADMIN — TAMSULOSIN HYDROCHLORIDE 0.4 MG: 0.4 CAPSULE ORAL at 21:10

## 2025-03-03 RX ADMIN — ENOXAPARIN SODIUM 40 MG: 100 INJECTION SUBCUTANEOUS at 13:34

## 2025-03-03 RX ADMIN — AMLODIPINE BESYLATE 10 MG: 10 TABLET ORAL at 21:10

## 2025-03-03 RX ADMIN — SODIUM CHLORIDE: 0.9 INJECTION, SOLUTION INTRAVENOUS at 23:43

## 2025-03-03 RX ADMIN — CLONIDINE HYDROCHLORIDE 0.1 MG: 0.1 TABLET ORAL at 21:10

## 2025-03-03 RX ADMIN — SODIUM CHLORIDE: 0.9 INJECTION, SOLUTION INTRAVENOUS at 10:14

## 2025-03-03 RX ADMIN — MYCOPHENOLATE MOFETIL 1000 MG: 250 CAPSULE ORAL at 21:10

## 2025-03-03 RX ADMIN — ACETAMINOPHEN 650 MG: 325 TABLET ORAL at 13:50

## 2025-03-03 SDOH — ECONOMIC STABILITY: HOUSING INSECURITY: IN THE LAST 12 MONTHS, WAS THERE A TIME WHEN YOU WERE NOT ABLE TO PAY THE MORTGAGE OR RENT ON TIME?: NO

## 2025-03-03 SDOH — ECONOMIC STABILITY: FOOD INSECURITY: HOW HARD IS IT FOR YOU TO PAY FOR THE VERY BASICS LIKE FOOD, HOUSING, MEDICAL CARE, AND HEATING?: SOMEWHAT HARD

## 2025-03-03 SDOH — ECONOMIC STABILITY: FOOD INSECURITY: WITHIN THE PAST 12 MONTHS, THE FOOD YOU BOUGHT JUST DIDN'T LAST AND YOU DIDN'T HAVE MONEY TO GET MORE.: SOMETIMES TRUE

## 2025-03-03 SDOH — ECONOMIC STABILITY: FOOD INSECURITY
WITHIN THE PAST 12 MONTHS, YOU WORRIED THAT YOUR FOOD WOULD RUN OUT BEFORE YOU GOT THE MONEY TO BUY MORE.: SOMETIMES TRUE

## 2025-03-03 SDOH — ECONOMIC STABILITY: TRANSPORTATION INSECURITY: IN THE PAST 12 MONTHS, HAS LACK OF TRANSPORTATION KEPT YOU FROM MEDICAL APPOINTMENTS OR FROM GETTING MEDICATIONS?: NO

## 2025-03-03 ASSESSMENT — PAIN - FUNCTIONAL ASSESSMENT: PAIN_FUNCTIONAL_ASSESSMENT: 0-10

## 2025-03-03 ASSESSMENT — ACTIVITIES OF DAILY LIVING (ADL): LACK_OF_TRANSPORTATION: NO

## 2025-03-03 ASSESSMENT — LIFESTYLE VARIABLES: HOW OFTEN DO YOU HAVE A DRINK CONTAINING ALCOHOL: NEVER

## 2025-03-03 ASSESSMENT — PAIN SCALES - GENERAL: PAINLEVEL_OUTOF10: 0

## 2025-03-03 NOTE — H&P
tablets (7.5 mg total) daily for 7 days, THEN 1 tablet (5 mg total) daily for 7 days, THEN 0.5 tablets (2.5 mg total) daily for 7 days.    Cecy St MD   filgrastim (NEUPOGEN) 300 MCG/ML injection Inject 0.5 mLs into the skin three times a week    Cecy St MD   albuterol (PROVENTIL) (2.5 MG/3ML) 0.083% nebulizer solution Take 3 mLs by nebulization every 4 hours as needed for Wheezing 2/6/25   Zeyad Jauregui DO   albuterol sulfate HFA (VENTOLIN HFA) 108 (90 Base) MCG/ACT inhaler Inhale 1 puff into the lungs 4 times daily as needed for Wheezing 1/16/25   Zeyad Jauregui DO   BENLYSTA 200 MG/ML SOAJ Inject 200 mg into the skin every 7 days Sundays    Cecy St MD   mycophenolate (CELLCEPT) 500 MG tablet Take 2 tablets by mouth 2 times daily 1/23/24   Cecy St MD   tamsulosin (FLOMAX) 0.4 MG capsule Take 1 capsule by mouth daily 5/6/24   Cecy St MD   acetaminophen (TYLENOL) 500 MG tablet Take 1 tablet by mouth 4 times daily as needed for Pain 5/15/24 11/15/24  Pedro Pablo Merida PA   sildenafil (VIAGRA) 50 MG tablet Take 1 tablet by mouth as needed for Erectile Dysfunction    Cecy St MD   Multiple Vitamins-Minerals (THERAPEUTIC MULTIVITAMIN-MINERALS) tablet Take 1 tablet by mouth daily    Cecy St MD       Allergies:  Bactrim [sulfamethoxazole-trimethoprim]    Social History:      The patient currently lives at home    TOBACCO:   reports that he quit smoking about 19 years ago. His smoking use included cigarettes. He has been exposed to tobacco smoke. He has never used smokeless tobacco.  ETOH:   reports that he does not currently use alcohol.  E-cigarette/Vaping       Questions Responses    E-cigarette/Vaping Use Never User    Start Date     Passive Exposure     Quit Date     Counseling Given     Comments               Family History:      Reviewed and negative in regards to presenting

## 2025-03-03 NOTE — ED NOTES
Pt's call light answered: sts he would like his breakfast tray brought to him. This nurse informed pt: breakfast has been ordered and will be taken to room via dietary when it is ready.

## 2025-03-03 NOTE — ED PROVIDER NOTES
(has no administration in time range)   sodium chloride flush 0.9 % injection 5-40 mL (has no administration in time range)   0.9 % sodium chloride infusion (has no administration in time range)   potassium chloride (KLOR-CON M) extended release tablet 40 mEq (has no administration in time range)     Or   potassium bicarb-citric acid (EFFER-K) effervescent tablet 40 mEq (has no administration in time range)     Or   potassium chloride 10 mEq/100 mL IVPB (Peripheral Line) (has no administration in time range)   magnesium sulfate 2000 mg in 50 mL IVPB premix (has no administration in time range)   enoxaparin (LOVENOX) injection 40 mg (has no administration in time range)   ondansetron (ZOFRAN-ODT) disintegrating tablet 4 mg (has no administration in time range)     Or   ondansetron (ZOFRAN) injection 4 mg (has no administration in time range)   polyethylene glycol (GLYCOLAX) packet 17 g (has no administration in time range)   acetaminophen (TYLENOL) tablet 650 mg (has no administration in time range)     Or   acetaminophen (TYLENOL) suppository 650 mg (has no administration in time range)   0.9 % sodium chloride infusion ( IntraVENous New Bag 3/3/25 1014)       PROCEDURES:  Unless otherwise noted below, none.     Procedures    FINAL IMPRESSION      1. Other fatigue    2. Non-traumatic rhabdomyolysis          DISPOSITION    Decision To Transfer 03/03/2025 11:30:03 AM      PATIENT REFERRED TO:  No follow-up provider specified.    DISCHARGE MEDICATIONS:  Current Discharge Medication List             (Comment: Please note this report has been produced using speech recognition software and may contain errors related to that system including errors in grammar, punctuation, and spelling, as well as words and phrases that may be inappropriate.  If there are any questions or concerns please feel free to contact the dictating provider for clarification.)    CEE CAMPOS MD (electronically signed)  Emergency Medicine Provider

## 2025-03-03 NOTE — PROGRESS NOTES
Medication Reconciliation    List of medications patient is currently taking is complete.     Source of information: 1. Conversation with patient at bedside                                      2. EPIC records    Patient takes Benlysta on Sundays  Patient takes Neupogen twice weekly on MON/THUR.  On 02/28/25, patient was started on the following Prednisone taper with 5 mg tablets:  Take 6 pills a day x1wk, 5 pills a day x1wk, 4 pills a day x1wk, 3 pills a day x1wk, 2 pills a day x1wk, 1 pill a day x1wk then stop     Kush Weller [facility], PharmD, BCPS  3/3/2025 1:02 PM

## 2025-03-03 NOTE — PROGRESS NOTES
Patient was in need of help paying his rent and utilities. I provided patient with a list of resources that can help. Patient is also in need of abdominal support brace/binder that he currently has a prescription for and was wondering if he could get it from the pharmacy here. I am mentioning this here and will consult with the nurse to see if it is possible for him to obtain one while here at the hospital.

## 2025-03-03 NOTE — PROGRESS NOTES
4 Eyes Skin Assessment     NAME:  Dusty Jauregui  YOB: 1970  MEDICAL RECORD NUMBER:  [id number]    The patient is being assessed for  Admission    I agree that at least one RN has performed a thorough Head to Toe Skin Assessment on the patient. ALL assessment sites listed below have been assessed.      Areas assessed by both nurses:    Head, Face, Ears, Shoulders, Back, Chest, Arms, Elbows, Hands, Sacrum. Buttock, Coccyx, Ischium, Legs. Feet and Heels, and Under Medical Devices         Does the Patient have a Wound? No noted wound(s)       Gilberto Prevention initiated by RN: No  Wound Care Orders initiated by RN: No    Pressure Injury (Stage 3,4, Unstageable, DTI, NWPT, and Complex wounds) if present, place Wound referral order by RN under : No    New Ostomies, if present place, Ostomy referral order under : No     Nurse 1 eSignature: Electronically signed by Maranda Rai RN on 3/3/25 at 2:08 PM EST    **SHARE this note so that the co-signing nurse can place an eSignature**    Nurse 2 eSignature: {Esignature:[id number]}

## 2025-03-03 NOTE — PROGRESS NOTES
Pt admitted to  4120 from ED, pt alert and oriented providing history, Vitals stable on Room air, Pt temp 100.7, tylenol administered per MAR, pt complaining of generalized body aches, IV fluids running, Urinal at bedside for voiding needs, pt oriented to room and call light encouraged to call for any needs.     Dr. Pereyra at bedside explaining to patient about transfer to  for rheumatology care.     Plan of care progressing.     Electronically signed by Maranda Rai RN on 3/3/25 at 2:06 PM EST

## 2025-03-03 NOTE — ED NOTES
Pt's call light answered: sts he would like all of his regular home medications provided to him; pt seems upset; sts \"the other nurse said she told doctor, don't know why you can't give them to me!\"; pt informed only NS has been ordered and pharmacy to verify any medications ordered via hospitalists to be administered prior to administering

## 2025-03-04 LAB
ALBUMIN SERPL-MCNC: 3.5 G/DL (ref 3.4–5)
ALBUMIN/GLOB SERPL: 1 {RATIO} (ref 1.1–2.2)
ALP SERPL-CCNC: 81 U/L (ref 40–129)
ALT SERPL-CCNC: 115 U/L (ref 10–40)
ANION GAP SERPL CALCULATED.3IONS-SCNC: 10 MMOL/L (ref 3–16)
ANISOCYTOSIS BLD QL SMEAR: ABNORMAL
AST SERPL-CCNC: 142 U/L (ref 15–37)
BASOPHILS # BLD: 0 K/UL (ref 0–0.2)
BASOPHILS NFR BLD: 0 %
BILIRUB SERPL-MCNC: <0.2 MG/DL (ref 0–1)
BUN SERPL-MCNC: 13 MG/DL (ref 7–20)
CALCIUM SERPL-MCNC: 8.5 MG/DL (ref 8.3–10.6)
CHLORIDE SERPL-SCNC: 102 MMOL/L (ref 99–110)
CK SERPL-CCNC: 1252 U/L (ref 39–308)
CK SERPL-CCNC: 1444 U/L (ref 39–308)
CK SERPL-CCNC: 1564 U/L (ref 39–308)
CO2 SERPL-SCNC: 22 MMOL/L (ref 21–32)
CREAT SERPL-MCNC: 0.6 MG/DL (ref 0.9–1.3)
DEPRECATED RDW RBC AUTO: 17.2 % (ref 12.4–15.4)
EOSINOPHIL # BLD: 0 K/UL (ref 0–0.6)
EOSINOPHIL NFR BLD: 0 %
GFR SERPLBLD CREATININE-BSD FMLA CKD-EPI: >90 ML/MIN/{1.73_M2}
GLUCOSE SERPL-MCNC: 85 MG/DL (ref 70–99)
HCT VFR BLD AUTO: 26.8 % (ref 40.5–52.5)
HGB BLD-MCNC: 8.7 G/DL (ref 13.5–17.5)
HYPOCHROMIA BLD QL SMEAR: ABNORMAL
LYMPHOCYTES # BLD: 0.7 K/UL (ref 1–5.1)
LYMPHOCYTES NFR BLD: 5 %
MCH RBC QN AUTO: 27.2 PG (ref 26–34)
MCHC RBC AUTO-ENTMCNC: 32.7 G/DL (ref 31–36)
MCV RBC AUTO: 83.2 FL (ref 80–100)
MONOCYTES # BLD: 1.7 K/UL (ref 0–1.3)
MONOCYTES NFR BLD: 12 %
NEUTROPHILS # BLD: 11.8 K/UL (ref 1.7–7.7)
NEUTROPHILS NFR BLD: 78 %
NEUTS BAND NFR BLD MANUAL: 5 % (ref 0–7)
OVALOCYTES BLD QL SMEAR: ABNORMAL
PATH INTERP BLD-IMP: NORMAL
PATH INTERP BLD-IMP: YES
PLATELET # BLD AUTO: 87 K/UL (ref 135–450)
PLATELET BLD QL SMEAR: ABNORMAL
PMV BLD AUTO: 10.4 FL (ref 5–10.5)
POIKILOCYTOSIS BLD QL SMEAR: ABNORMAL
POLYCHROMASIA BLD QL SMEAR: ABNORMAL
POTASSIUM SERPL-SCNC: 3.6 MMOL/L (ref 3.5–5.1)
PROT SERPL-MCNC: 7 G/DL (ref 6.4–8.2)
RBC # BLD AUTO: 3.22 M/UL (ref 4.2–5.9)
SCHISTOCYTES BLD QL SMEAR: ABNORMAL
SLIDE REVIEW: ABNORMAL
SODIUM SERPL-SCNC: 134 MMOL/L (ref 136–145)
WBC # BLD AUTO: 14.2 K/UL (ref 4–11)

## 2025-03-04 PROCEDURE — 2500000003 HC RX 250 WO HCPCS: Performed by: INTERNAL MEDICINE

## 2025-03-04 PROCEDURE — 36415 COLL VENOUS BLD VENIPUNCTURE: CPT

## 2025-03-04 PROCEDURE — 6370000000 HC RX 637 (ALT 250 FOR IP): Performed by: INTERNAL MEDICINE

## 2025-03-04 PROCEDURE — 6370000000 HC RX 637 (ALT 250 FOR IP): Performed by: HOSPITALIST

## 2025-03-04 PROCEDURE — 80053 COMPREHEN METABOLIC PANEL: CPT

## 2025-03-04 PROCEDURE — 6360000002 HC RX W HCPCS: Performed by: HOSPITALIST

## 2025-03-04 PROCEDURE — 2580000003 HC RX 258: Performed by: INTERNAL MEDICINE

## 2025-03-04 PROCEDURE — 82550 ASSAY OF CK (CPK): CPT

## 2025-03-04 PROCEDURE — 1200000000 HC SEMI PRIVATE

## 2025-03-04 PROCEDURE — 85025 COMPLETE CBC W/AUTO DIFF WBC: CPT

## 2025-03-04 RX ORDER — PREDNISONE 5 MG/1
5 TABLET ORAL DAILY
Status: DISCONTINUED | OUTPATIENT
Start: 2025-04-04 | End: 2025-03-05 | Stop reason: HOSPADM

## 2025-03-04 RX ORDER — PREDNISONE 10 MG/1
10 TABLET ORAL DAILY
Status: DISCONTINUED | OUTPATIENT
Start: 2025-03-28 | End: 2025-03-05 | Stop reason: HOSPADM

## 2025-03-04 RX ORDER — PREDNISONE 5 MG/1
5 TABLET ORAL SEE ADMIN INSTRUCTIONS
Status: DISCONTINUED | OUTPATIENT
Start: 2025-03-04 | End: 2025-03-04 | Stop reason: DRUGHIGH

## 2025-03-04 RX ORDER — PREDNISONE 20 MG/1
20 TABLET ORAL DAILY
Status: DISCONTINUED | OUTPATIENT
Start: 2025-03-14 | End: 2025-03-05 | Stop reason: HOSPADM

## 2025-03-04 RX ADMIN — CLONIDINE HYDROCHLORIDE 0.1 MG: 0.1 TABLET ORAL at 20:22

## 2025-03-04 RX ADMIN — LISINOPRIL 40 MG: 40 TABLET ORAL at 09:41

## 2025-03-04 RX ADMIN — SODIUM CHLORIDE, PRESERVATIVE FREE 10 ML: 5 INJECTION INTRAVENOUS at 20:24

## 2025-03-04 RX ADMIN — MYCOPHENOLATE MOFETIL 1000 MG: 250 CAPSULE ORAL at 09:40

## 2025-03-04 RX ADMIN — MYCOPHENOLATE MOFETIL 1000 MG: 250 CAPSULE ORAL at 20:23

## 2025-03-04 RX ADMIN — HYDROCHLOROTHIAZIDE 25 MG: 25 TABLET ORAL at 09:41

## 2025-03-04 RX ADMIN — AMLODIPINE BESYLATE 10 MG: 10 TABLET ORAL at 09:41

## 2025-03-04 RX ADMIN — SODIUM CHLORIDE: 0.9 INJECTION, SOLUTION INTRAVENOUS at 06:19

## 2025-03-04 RX ADMIN — TAMSULOSIN HYDROCHLORIDE 0.4 MG: 0.4 CAPSULE ORAL at 20:23

## 2025-03-04 RX ADMIN — PREDNISONE 30 MG: 20 TABLET ORAL at 14:48

## 2025-03-04 RX ADMIN — CLONIDINE HYDROCHLORIDE 0.1 MG: 0.1 TABLET ORAL at 09:41

## 2025-03-04 ASSESSMENT — PAIN DESCRIPTION - DESCRIPTORS: DESCRIPTORS: DISCOMFORT;SORE

## 2025-03-04 ASSESSMENT — PAIN DESCRIPTION - LOCATION: LOCATION: ARM

## 2025-03-04 ASSESSMENT — PAIN DESCRIPTION - ORIENTATION: ORIENTATION: RIGHT;LEFT

## 2025-03-04 ASSESSMENT — PAIN SCALES - GENERAL
PAINLEVEL_OUTOF10: 0
PAINLEVEL_OUTOF10: 4
PAINLEVEL_OUTOF10: 0

## 2025-03-04 ASSESSMENT — PAIN DESCRIPTION - ONSET: ONSET: ON-GOING

## 2025-03-04 NOTE — PLAN OF CARE
Problem: Discharge Planning  Goal: Discharge to home or other facility with appropriate resources  Outcome: Progressing  Flowsheets  Taken 3/3/2025 2104 by Zainab Morelos RN  Discharge to home or other facility with appropriate resources: Identify barriers to discharge with patient and caregiver  Taken 3/3/2025 1402 by Maranda Rai RN  Discharge to home or other facility with appropriate resources:   Identify barriers to discharge with patient and caregiver   Arrange for needed discharge resources and transportation as appropriate     Problem: Pain  Goal: Verbalizes/displays adequate comfort level or baseline comfort level  Outcome: Progressing

## 2025-03-04 NOTE — PROGRESS NOTES
Pt awake and alert, enquiring about home medications. Secure message sent to Yuki Abreu NP. New orders received. Pt updated on POC and medications. Call light and needs in reach.
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
## 2025-03-04 NOTE — DISCHARGE SUMMARY
Hospital Medicine Discharge Summary    Patient ID: Dusty Jauregui      Patient's PCP: Linda Carpenter, APRN - CNP    Admit Date: 3/3/2025     Discharge Date:   03/04/2025    Admitting Provider: Ramsey Pereyra MD     Discharge Provider: Ramsey Pereyra MD     Discharge Diagnoses:       Active Hospital Problems    Diagnosis     Rhabdomyolysis [M62.82]        The patient was seen and examined on day of discharge and this discharge summary is in conjunction with any daily progress note from day of discharge.    Hospital Course:     From HPI:\"54 y.o. male who presented to [facility] with generalized weakness and generalized aches, patient presented to the hospital few days ago, decision was made to admit to the hospital, patient left AMA at that time came back with muscle weakness aches, CK elevated denies fever chills nausea vomiting no shortness of breath or cough.  In ED again CK level was high and patient was admitted for further management and treatment \"      Generalized weakness with elevated CK likely myositis and rhabdomyolysis, IV fluid, supportive measures, home medication and discharging to  hospital  Recent pancytopenia, recently visited with hematology at  need further consult at   History of lupus, needs rheumatology follow-up  MGUS, hematology consult at   Treated multiple times for pneumonia no signs of infection at this time  Elevated LFTs, likely due to above  Anemia, as above            Physical Exam Performed:     /79   Pulse 91   Temp 99.1 °F (37.3 °C) (Oral)   Resp 18   Ht 1.753 m (5' 9\")   Wt 67.5 kg (148 lb 13 oz)   SpO2 100%   BMI 21.98 kg/m²       General appearance:  No apparent distress  HEENT:  Normal cephalic.  Respiratory:  Normal respiratory effort. Clear to auscultation, bilaterally without Rales/Wheezes/Rhonchi.  Cardiovascular:  Regular rate and rhythm with normal S1/S2 without murmurs, rubs or gallops.  Abdomen: Soft, non-tender  Musculoskeletal:

## 2025-03-05 VITALS
BODY MASS INDEX: 21.37 KG/M2 | DIASTOLIC BLOOD PRESSURE: 80 MMHG | HEART RATE: 92 BPM | HEIGHT: 69 IN | RESPIRATION RATE: 18 BRPM | TEMPERATURE: 98.2 F | WEIGHT: 144.3 LBS | OXYGEN SATURATION: 100 % | SYSTOLIC BLOOD PRESSURE: 127 MMHG

## 2025-03-05 LAB
CK SERPL-CCNC: 1142 U/L (ref 39–308)
CK SERPL-CCNC: 990 U/L (ref 39–308)

## 2025-03-05 PROCEDURE — 2500000003 HC RX 250 WO HCPCS: Performed by: INTERNAL MEDICINE

## 2025-03-05 PROCEDURE — 6370000000 HC RX 637 (ALT 250 FOR IP): Performed by: HOSPITALIST

## 2025-03-05 PROCEDURE — 36415 COLL VENOUS BLD VENIPUNCTURE: CPT

## 2025-03-05 PROCEDURE — 6370000000 HC RX 637 (ALT 250 FOR IP): Performed by: INTERNAL MEDICINE

## 2025-03-05 PROCEDURE — 6360000002 HC RX W HCPCS: Performed by: HOSPITALIST

## 2025-03-05 PROCEDURE — 82550 ASSAY OF CK (CPK): CPT

## 2025-03-05 RX ADMIN — HYDROCHLOROTHIAZIDE 25 MG: 25 TABLET ORAL at 08:32

## 2025-03-05 RX ADMIN — SODIUM CHLORIDE, PRESERVATIVE FREE 10 ML: 5 INJECTION INTRAVENOUS at 08:38

## 2025-03-05 RX ADMIN — CLONIDINE HYDROCHLORIDE 0.1 MG: 0.1 TABLET ORAL at 08:32

## 2025-03-05 RX ADMIN — PREDNISONE 30 MG: 20 TABLET ORAL at 08:32

## 2025-03-05 RX ADMIN — MYCOPHENOLATE MOFETIL 1000 MG: 250 CAPSULE ORAL at 08:32

## 2025-03-05 RX ADMIN — AMLODIPINE BESYLATE 10 MG: 10 TABLET ORAL at 08:31

## 2025-03-05 RX ADMIN — LISINOPRIL 40 MG: 40 TABLET ORAL at 08:32

## 2025-03-05 NOTE — PLAN OF CARE
54-year-old patient admitted to the hospital with rhabdomyolysis possible myositis plan was to transfer to  for rheumatology expertise and hematology expertise transfer accepted pending bed availability.  Patient left AMA  Diagnosis at the time of leave       Generalized weakness with rhabdomyolysis  Recent pancytopenia  History of lupus  MGUS,  Elevated LFTs,  Anemia,

## 2025-03-05 NOTE — PROGRESS NOTES
Pt left AMA with risks explained. Form signed. IV removed with no complications. Provider notified. Electronically signed by JOHN LIGHT RN on 3/5/2025 at 10:35 AM

## 2025-03-05 NOTE — PLAN OF CARE
Problem: Discharge Planning  Goal: Discharge to home or other facility with appropriate resources  3/5/2025 0223 by Zainab Morelos, RN  Outcome: Progressing  Flowsheets (Taken 3/4/2025 2022)  Discharge to home or other facility with appropriate resources: Identify barriers to discharge with patient and caregiver  3/4/2025 1724 by Barbara Hooker, RN  Outcome: Progressing     Problem: Pain  Goal: Verbalizes/displays adequate comfort level or baseline comfort level  Outcome: Progressing     Problem: Skin/Tissue Integrity - Adult  Goal: Skin integrity remains intact  Outcome: Progressing     Problem: Musculoskeletal - Adult  Goal: Return mobility to safest level of function  Outcome: Progressing  Goal: Return ADL status to a safe level of function  Outcome: Progressing     Problem: Gastrointestinal - Adult  Goal: Maintains adequate nutritional intake  Outcome: Progressing     Problem: Metabolic/Fluid and Electrolytes - Adult  Goal: Electrolytes maintained within normal limits  Outcome: Progressing  Goal: Hemodynamic stability and optimal renal function maintained  Outcome: Progressing     Problem: Hematologic - Adult  Goal: Maintains hematologic stability  Outcome: Progressing     Problem: Safety - Adult  Goal: Free from fall injury  Outcome: Progressing

## 2025-03-11 ENCOUNTER — TELEPHONE (OUTPATIENT)
Dept: PULMONOLOGY | Age: 55
End: 2025-03-11

## 2025-03-11 NOTE — TELEPHONE ENCOUNTER
I called Dusty to confirm his appointment Thursday. He cancelled it stating that he has elevated levels and will probably be in the hospital. He says to report that he does feel better from the pneumonia he had in November and that he will follow up in a month or so.